# Patient Record
Sex: FEMALE | Race: WHITE | NOT HISPANIC OR LATINO | Employment: FULL TIME | URBAN - METROPOLITAN AREA
[De-identification: names, ages, dates, MRNs, and addresses within clinical notes are randomized per-mention and may not be internally consistent; named-entity substitution may affect disease eponyms.]

---

## 2017-02-24 ENCOUNTER — OFFICE VISIT (OUTPATIENT)
Dept: FAMILY MEDICINE | Facility: CLINIC | Age: 22
End: 2017-02-24
Payer: COMMERCIAL

## 2017-02-24 VITALS
HEART RATE: 88 BPM | BODY MASS INDEX: 47.75 KG/M2 | WEIGHT: 286.63 LBS | OXYGEN SATURATION: 97 % | RESPIRATION RATE: 18 BRPM | HEIGHT: 65 IN | SYSTOLIC BLOOD PRESSURE: 110 MMHG | TEMPERATURE: 98 F | DIASTOLIC BLOOD PRESSURE: 72 MMHG

## 2017-02-24 DIAGNOSIS — K21.9 GASTROESOPHAGEAL REFLUX DISEASE, ESOPHAGITIS PRESENCE NOT SPECIFIED: Primary | ICD-10-CM

## 2017-02-24 DIAGNOSIS — E66.01 MORBID OBESITY WITH BMI OF 45.0-49.9, ADULT: ICD-10-CM

## 2017-02-24 PROCEDURE — 99999 PR PBB SHADOW E&M-EST. PATIENT-LVL III: CPT | Mod: PBBFAC,,, | Performed by: FAMILY MEDICINE

## 2017-02-24 PROCEDURE — 99214 OFFICE O/P EST MOD 30 MIN: CPT | Mod: S$GLB,,, | Performed by: FAMILY MEDICINE

## 2017-02-24 PROCEDURE — 1160F RVW MEDS BY RX/DR IN RCRD: CPT | Mod: S$GLB,,, | Performed by: FAMILY MEDICINE

## 2017-02-24 RX ORDER — OMEPRAZOLE 20 MG/1
20 CAPSULE, DELAYED RELEASE ORAL DAILY
Qty: 30 CAPSULE | Refills: 11 | Status: SHIPPED | OUTPATIENT
Start: 2017-02-24 | End: 2017-07-06 | Stop reason: ALTCHOICE

## 2017-02-24 NOTE — MR AVS SNAPSHOT
[facility]  [address] [facility]  [place] [address]  Phone: [phone]                  Frankie Garcia   2017 4:20 PM   Office Visit    Description:  Female : 1995   Provider:  Kisha Cortes MD   Department:  [facility]           Reason for Visit     Chest Pain     Headache           Diagnoses this Visit        Comments    Gastroesophageal reflux disease, esophagitis presence not specified    -  Primary            To Do List           Future Appointments        Provider Department Dept Phone    2017 4:20 PM Kisha Cortes MD [facility] [phone]      Goals (5 Years of Data)     None       These Medications        Disp Refills Start End    omeprazole (PRILOSEC) 20 MG capsule 30 capsule 11 2017    Take 1 capsule (20 mg total) by mouth once daily. - Oral    Pharmacy: [facility] - [address] Ph #: [phone]         [facility]     [facility] Nurse Care Line -  Assistance  Registered nurses in the [facility] provide clinical advisement, health education, appointment booking, and other advisory services.  Call for this free service at [phone].             Medications           Message regarding Medications     Verify the changes and/or additions to your medication regime listed below are the same as discussed with your clinician today.  If any of these changes or additions are incorrect, please notify your healthcare provider.        START taking these NEW medications        Refills    omeprazole (PRILOSEC) 20 MG capsule 11    Sig: Take 1 capsule (20 mg total) by mouth once daily.    Class: Normal    Route: Oral      STOP taking these medications     fluticasone (FLONASE) 50 mcg/actuation nasal spray 1 spray by Each Nare route once daily.    L norgest/e.estradiol-e.estrad (CAMRESE LO) 0.10 mg-20 mcg (84)/10 mcg (7) 3MPk Take 1  tablet by mouth once daily.    fexofenadine (ALLEGRA) 180 MG tablet Take 1 tablet (180 mg total) by mouth once daily.           Verify that the below list of medications is an accurate representation of the medications you are currently taking.  If none reported, the list may be blank. If incorrect, please contact your healthcare provider. Carry this list with you in case of emergency.           Current Medications     omeprazole (PRILOSEC) 20 MG capsule Take 1 capsule (20 mg total) by mouth once daily.           Clinical Reference Information           Your Vitals Were     BP                   110/72 (BP Location: Right arm, Patient Position: Sitting, BP Method: Manual)           Blood Pressure          Most Recent Value    BP  110/72      Allergies as of 2/24/2017     No Known Allergies      Immunizations Administered on Date of Encounter - 2/24/2017     None      Language Assistance Services     ATTENTION: Language assistance services are available, free of charge. Please call [phone].      ATENCIÓN: Si habla eva, tiene a holloway disposición servicios gratuitos de asistencia lingüística. Llame al [phone].     [facility] Ý: N?u b?n nói Ti?ng Vi?t, có các d?ch v? h? tr? ngôn ng? mi?n phí dành cho b?n. G?i s? [phone].         [facility] complies with applicable Federal civil rights laws and does not discriminate on the basis of race, color, national origin, age, disability, or sex.

## 2017-02-24 NOTE — PROGRESS NOTES
Routine Office Visit    Patient Name: Frankie Garcia    : 1995  MRN: [id number]    Subjective:  Frankie is a 21 y.o. female who presents today for:    1. Chest pressure - epigastric pressure for a couple weeks.  Happens throughout the day occasionally, but notices that it's worse with laying down.  Feels like it radiates to her upper back as well.  4-5 times a week, having frontal tightness in her forehead.  Takes ibuprofen, sometimes helps.  Used to have chest pressure on/off in the past.  Has nightmares a lot, wonders if that was associated.  Has woken up in the night, and feels pressure in middle of her chest.  Right now having stabbing pressure in epigastric area.  Has felt decreased appetite, +acid coming up throat. Denies metallic taste.  Hasn't noticed belching. +nausea - this morning. Works at the [employer].  Never has been told she has GERD before. Denies alcohol use. Denies cigarette use.     Coffee - 1-2 times a week.     Past Medical History  History reviewed. No pertinent past medical history.    Past Surgical History  History reviewed. No pertinent surgical history.    Family History  Family History   Problem Relation Age of Onset    Arthritis Father     Diabetes Maternal Aunt        Social History  Social History     Social History    Marital status: Single     Spouse name: N/A    Number of children: N/A    Years of education: N/A     Occupational History    Not on file.     Social History Main Topics    Smoking status: Never Smoker    Smokeless tobacco: Never Used    Alcohol use No    Drug use: No    Sexual activity: Yes     Partners: Female     Birth control/ protection: None     Other Topics Concern    Not on file     Social History Narrative       Current Medications  Current Outpatient Prescriptions on File Prior to Visit   Medication Sig Dispense Refill    fexofenadine (ALLEGRA) 180 MG tablet Take 1 tablet (180 mg total) by mouth once daily. 30 tablet 2    L  "norgest/e.estradiol-e.estrad (CAMRESE LO) 0.10 mg-20 mcg (84)/10 mcg (7) 3MPk Take 1 tablet by mouth once daily. 90 tablet 3    [DISCONTINUED] fluticasone (FLONASE) 50 mcg/actuation nasal spray 1 spray by Each Nare route once daily. 1 Bottle 0     No current facility-administered medications on file prior to visit.        Allergies   Review of patient's allergies indicates:  No Known Allergies    Review of Systems (Pertinent positives)  Constititutional: Weight loss, excess fatigue, chills, fever, night sweats, weakness, loss of appetite  Ears: Earache, ringing in ears, discharge, hearing loss, hearing aid, popping, infection Nose: stuffy nose, mouth breathing, post-nasal drip,   Throat: hoarseness, bad breath, swelling, sore throat  Lungs: Cough, sputum, wheeze, frequent URI  Heart: Chest pain, angina, palpitations, extra heart beats  Stomach/Intestine: Heartburn, Nausea, vomiting, diarrhea, indigestion, bloating, constipation  Brain: Numbness, tingling, tremor, fainting, headaches, muscle weakness,    /72 (BP Location: Right arm, Patient Position: Sitting, BP Method: Manual)  Pulse 88  Temp 98.3 °F (36.8 °C) (Oral)   Resp 18  Ht 5' 5" (1.651 m)  Wt 130 kg (286 lb 9.6 oz)  LMP 02/03/2017  SpO2 97%  BMI 47.69 kg/m2    GENERAL APPEARANCE: in no apparent distress and well developed and well nourished  RESPIRATORY: appears well, vitals normal, no respiratory distress, acyanotic, normal RR, chest clear, no wheezing, crepitations, rhonchi, normal symmetric air entry  HEART: regular rate and rhythm, S1, S2 normal, no murmur, click, rub or gallop.    ABDOMEN: abdomen is soft with +epigastric tenderness, no masses, no hernias, no organomegaly, no rebound, no guarding. Suprapubic tenderness absent. No CVA tenderness.  NEUROLOGIC: normal without focal findings, CN II-XII are intact.    Extremities: warm/well perfused.  No abnormal hair patterns.  No clubbing, cyanosis or edema.   SKIN: no rashes, no wounds, no " other lesions  PSYCH: Alert, oriented x 3, thought content appropriate, speech normal, pleasant and cooperative, good eye contact, well groomed, recall good, concentration/judgement good and apparently average intelligence.    Assessment/Plan:  Frankie Garcia is a 21 y.o. female who presents today for :    Frankie was seen today for chest pain and headache.    Diagnoses and all orders for this visit:    Gastroesophageal reflux disease, esophagitis presence not specified  -     omeprazole (PRILOSEC) 20 MG capsule; Take 1 capsule (20 mg total) by mouth once daily.  -    Handout for GERD dietary considerations.  Also recommended weight loss     Morbid obesity with BMI of 45.0-49.9, adult        -   Noted.  Patient will return to discuss when able    F/u PRN if symptoms persist

## 2017-07-06 ENCOUNTER — OFFICE VISIT (OUTPATIENT)
Dept: FAMILY MEDICINE | Facility: CLINIC | Age: 22
End: 2017-07-06
Payer: COMMERCIAL

## 2017-07-06 VITALS
BODY MASS INDEX: 47.09 KG/M2 | HEIGHT: 66 IN | WEIGHT: 293 LBS | SYSTOLIC BLOOD PRESSURE: 110 MMHG | DIASTOLIC BLOOD PRESSURE: 72 MMHG | TEMPERATURE: 98 F | OXYGEN SATURATION: 99 % | HEART RATE: 72 BPM

## 2017-07-06 DIAGNOSIS — Z23 NEED FOR HPV VACCINATION: ICD-10-CM

## 2017-07-06 DIAGNOSIS — J06.9 VIRAL URI: Primary | ICD-10-CM

## 2017-07-06 PROCEDURE — 99999 PR PBB SHADOW E&M-EST. PATIENT-LVL III: CPT | Mod: PBBFAC,,, | Performed by: INTERNAL MEDICINE

## 2017-07-06 PROCEDURE — 99213 OFFICE O/P EST LOW 20 MIN: CPT | Mod: S$GLB,,, | Performed by: INTERNAL MEDICINE

## 2017-07-06 PROCEDURE — 90651 9VHPV VACCINE 2/3 DOSE IM: CPT | Mod: S$GLB,,, | Performed by: INTERNAL MEDICINE

## 2017-07-06 PROCEDURE — 90471 IMMUNIZATION ADMIN: CPT | Mod: S$GLB,,, | Performed by: INTERNAL MEDICINE

## 2017-07-06 RX ORDER — BENZONATATE 100 MG/1
100 CAPSULE ORAL 3 TIMES DAILY PRN
Qty: 30 CAPSULE | Refills: 0 | Status: SHIPPED | OUTPATIENT
Start: 2017-07-06 | End: 2017-07-16

## 2017-07-06 RX ORDER — IPRATROPIUM BROMIDE 42 UG/1
2 SPRAY, METERED NASAL 3 TIMES DAILY
Qty: 30 ML | Refills: 0 | Status: SHIPPED | OUTPATIENT
Start: 2017-07-06 | End: 2017-07-13

## 2017-07-06 NOTE — PROGRESS NOTES
This note was created by combination of typed  and Dragon dictation.  Transcription errors may be present.  If there are any questions, please contact me.    Assessment & Plan  Viral URI - rest, fluids.  Tessalon and atrovent for symptom control  -     benzonatate (TESSALON) 100 MG capsule; Take 1 capsule (100 mg total) by mouth 3 (three) times daily as needed for Cough.  Dispense: 30 capsule; Refill: 0  -     ipratropium (ATROVENT) 0.06 % nasal spray; 2 sprays by Nasal route 3 (three) times daily. AS NEEDED FOR NASAL CONGESTION AND DRIP  Dispense: 30 mL; Refill: 0    Need for HPV vaccination - start series today.  -     HPV Vaccine (9-Valent) (3 Dose) (IM)        Medications Discontinued During This Encounter   Medication Reason    omeprazole (PRILOSEC) 20 MG capsule Therapy completed       Follow-up: No Follow-up on file.      =================================================================      Chief Complaint   Patient presents with    Facial Pain    throat pain       HPI  Frankie is a 22 y.o. female, last appointment with this clinic was Visit date not found.    Patient's last menstrual period was 06/28/2017 (exact date).    2-3 days of sore throat, congestion, pressure in the chest and head.  Fever on July 4, 100.something.  The post nasal drip making her nauseated.  Can't sleep b/c of the sneezing.  Nothing OTC for this so far other than 2 Aleve for the fever.  Sick contacts - none.  Cough, nonproductive.  Hx of childhood asthma.  No allergic rhinitis.  No smoker.      Patient Care Team:  Michelle Hadley MD as PCP - General (Family Medicine)    Patient Active Problem List    Diagnosis Date Noted    Morbid obesity with BMI of 45.0-49.9, adult 02/24/2017       PAST MEDICAL HISTORY:  History reviewed. No pertinent past medical history.    PAST SURGICAL HISTORY:  History reviewed. No pertinent surgical history.    SOCIAL HISTORY:  Social History     Social History    Marital status: Single      "Spouse name: N/A    Number of children: N/A    Years of education: N/A     Occupational History    Not on file.     Social History Main Topics    Smoking status: Never Smoker    Smokeless tobacco: Never Used    Alcohol use No    Drug use: No    Sexual activity: Yes     Partners: Female     Birth control/ protection: None     Other Topics Concern    Not on file     Social History Narrative    No narrative on file       ALLERGIES AND MEDICATIONS: updated and reviewed.  Review of patient's allergies indicates:  No Known Allergies  No current outpatient prescriptions on file.     No current facility-administered medications for this visit.        Review of Systems   Constitutional: Positive for fever. Negative for chills and malaise/fatigue.   HENT: Positive for congestion and sore throat. Negative for ear pain and hearing loss.    Respiratory: Positive for cough. Negative for sputum production and shortness of breath.    Cardiovascular: Negative for chest pain.   Musculoskeletal: Negative for myalgias and neck pain.   Skin: Negative for rash.   Neurological: Negative for headaches.       Physical Exam   Vitals:    07/06/17 1019   BP: 110/72   Pulse: 72   Temp: 98.3 °F (36.8 °C)   SpO2: 99%   Weight: 135.5 kg (298 lb 11.6 oz)   Height: 5' 5.5" (1.664 m)    Body mass index is 48.95 kg/m².  Weight: 135.5 kg (298 lb 11.6 oz)   Height: 5' 5.5" (166.4 cm)     Physical Exam   Constitutional: She is oriented to person, place, and time. She appears well-developed and well-nourished.   HENT:   Right Ear: Tympanic membrane and ear canal normal.   Left Ear: Tympanic membrane and ear canal normal.   Mouth/Throat: No oral lesions. No oropharyngeal exudate or posterior oropharyngeal erythema.   OP mild cobblestoning   Eyes: EOM are normal.   Neck: Neck supple.   Cardiovascular: Normal rate, regular rhythm and normal heart sounds.    Pulmonary/Chest: Effort normal and breath sounds normal. She has no wheezes. "   Lymphadenopathy:     She has no cervical adenopathy.   Neurological: She is alert and oriented to person, place, and time.   Skin: Skin is warm and dry.   Psychiatric: She has a normal mood and affect. Her behavior is normal.

## 2019-02-18 ENCOUNTER — HOSPITAL ENCOUNTER (INPATIENT)
Facility: HOSPITAL | Age: 24
LOS: 3 days | Discharge: HOME | DRG: 872 | End: 2019-02-22
Attending: EMERGENCY MEDICINE | Admitting: HOSPITALIST
Payer: COMMERCIAL

## 2019-02-18 DIAGNOSIS — N12 PYELONEPHRITIS: Primary | ICD-10-CM

## 2019-02-18 DIAGNOSIS — R50.9 FEVER: ICD-10-CM

## 2019-02-18 PROBLEM — E66.01 MORBID OBESITY WITH BMI OF 45.0-49.9, ADULT: Chronic | Status: ACTIVE | Noted: 2017-02-24

## 2019-02-18 PROBLEM — E87.1 HYPONATREMIA: Status: ACTIVE | Noted: 2019-02-18

## 2019-02-18 PROBLEM — F17.210 CIGARETTE SMOKER: Chronic | Status: ACTIVE | Noted: 2019-02-18

## 2019-02-18 PROBLEM — E66.813 CLASS 3 SEVERE OBESITY DUE TO EXCESS CALORIES WITHOUT SERIOUS COMORBIDITY WITH BODY MASS INDEX (BMI) OF 40.0 TO 44.9 IN ADULT: Chronic | Status: ACTIVE | Noted: 2017-02-24

## 2019-02-18 PROBLEM — I88.0 MESENTERIC LYMPHADENITIS: Status: ACTIVE | Noted: 2019-02-18

## 2019-02-18 PROBLEM — D64.9 ANEMIA: Status: ACTIVE | Noted: 2019-02-18

## 2019-02-18 LAB
ALBUMIN SERPL BCP-MCNC: 4 G/DL
ALP SERPL-CCNC: 81 U/L
ALT SERPL W/O P-5'-P-CCNC: 23 U/L
ANION GAP SERPL CALC-SCNC: 10 MMOL/L
AST SERPL-CCNC: 16 U/L
B-HCG UR QL: NEGATIVE
BACTERIA #/AREA URNS HPF: ABNORMAL /HPF
BASOPHILS # BLD AUTO: 0.01 K/UL
BASOPHILS NFR BLD: 0.1 %
BILIRUB SERPL-MCNC: 1.1 MG/DL
BILIRUB UR QL STRIP: NEGATIVE
BUN SERPL-MCNC: 11 MG/DL
CALCIUM SERPL-MCNC: 9.5 MG/DL
CHLORIDE SERPL-SCNC: 103 MMOL/L
CLARITY UR: CLEAR
CO2 SERPL-SCNC: 20 MMOL/L
COLOR UR: ABNORMAL
CREAT SERPL-MCNC: 0.9 MG/DL
CTP QC/QA: YES
DIFFERENTIAL METHOD: ABNORMAL
EOSINOPHIL # BLD AUTO: 0.1 K/UL
EOSINOPHIL NFR BLD: 1.1 %
ERYTHROCYTE [DISTWIDTH] IN BLOOD BY AUTOMATED COUNT: 13.6 %
EST. GFR  (AFRICAN AMERICAN): >60 ML/MIN/1.73 M^2
EST. GFR  (NON AFRICAN AMERICAN): >60 ML/MIN/1.73 M^2
FERRITIN SERPL-MCNC: 145 NG/ML
GLUCOSE SERPL-MCNC: 92 MG/DL
GLUCOSE UR QL STRIP: NEGATIVE
HCT VFR BLD AUTO: 36.4 %
HGB BLD-MCNC: 11.9 G/DL
HGB UR QL STRIP: ABNORMAL
HYALINE CASTS #/AREA URNS LPF: 0 /LPF
IRON SERPL-MCNC: 15 UG/DL
KETONES UR QL STRIP: ABNORMAL
LACTATE SERPL-SCNC: 0.7 MMOL/L
LEUKOCYTE ESTERASE UR QL STRIP: NEGATIVE
LYMPHOCYTES # BLD AUTO: 1 K/UL
LYMPHOCYTES NFR BLD: 13.3 %
MCH RBC QN AUTO: 28.7 PG
MCHC RBC AUTO-ENTMCNC: 32.7 G/DL
MCV RBC AUTO: 88 FL
MICROSCOPIC COMMENT: ABNORMAL
MONOCYTES # BLD AUTO: 0.8 K/UL
MONOCYTES NFR BLD: 10.1 %
NEUTROPHILS # BLD AUTO: 5.6 K/UL
NEUTROPHILS NFR BLD: 75.4 %
NITRITE UR QL STRIP: POSITIVE
PH UR STRIP: 5 [PH] (ref 5–8)
PLATELET # BLD AUTO: 199 K/UL
PMV BLD AUTO: 9.7 FL
POTASSIUM SERPL-SCNC: 3.9 MMOL/L
PROT SERPL-MCNC: 7.4 G/DL
PROT UR QL STRIP: ABNORMAL
RBC # BLD AUTO: 4.15 M/UL
RBC #/AREA URNS HPF: 2 /HPF (ref 0–4)
SATURATED IRON: 5 %
SODIUM SERPL-SCNC: 133 MMOL/L
SP GR UR STRIP: 1.01 (ref 1–1.03)
SQUAMOUS #/AREA URNS HPF: 3 /HPF
TOTAL IRON BINDING CAPACITY: 329 UG/DL
TRANSFERRIN SERPL-MCNC: 222 MG/DL
URN SPEC COLLECT METH UR: ABNORMAL
UROBILINOGEN UR STRIP-ACNC: ABNORMAL EU/DL
WBC # BLD AUTO: 7.42 K/UL
WBC #/AREA URNS HPF: 3 /HPF (ref 0–5)

## 2019-02-18 PROCEDURE — 63600175 PHARM REV CODE 636 W HCPCS: Performed by: HOSPITALIST

## 2019-02-18 PROCEDURE — G0378 HOSPITAL OBSERVATION PER HR: HCPCS

## 2019-02-18 PROCEDURE — 96372 THER/PROPH/DIAG INJ SC/IM: CPT

## 2019-02-18 PROCEDURE — 83605 ASSAY OF LACTIC ACID: CPT

## 2019-02-18 PROCEDURE — 96375 TX/PRO/DX INJ NEW DRUG ADDON: CPT

## 2019-02-18 PROCEDURE — 25000003 PHARM REV CODE 250: Performed by: PHYSICIAN ASSISTANT

## 2019-02-18 PROCEDURE — 82728 ASSAY OF FERRITIN: CPT

## 2019-02-18 PROCEDURE — 85025 COMPLETE CBC W/AUTO DIFF WBC: CPT

## 2019-02-18 PROCEDURE — 93010 ELECTROCARDIOGRAM REPORT: CPT | Mod: ,,, | Performed by: INTERNAL MEDICINE

## 2019-02-18 PROCEDURE — 80053 COMPREHEN METABOLIC PANEL: CPT

## 2019-02-18 PROCEDURE — 93010 EKG 12-LEAD: ICD-10-PCS | Mod: ,,, | Performed by: INTERNAL MEDICINE

## 2019-02-18 PROCEDURE — 25500020 PHARM REV CODE 255: Performed by: EMERGENCY MEDICINE

## 2019-02-18 PROCEDURE — 83540 ASSAY OF IRON: CPT

## 2019-02-18 PROCEDURE — 63600175 PHARM REV CODE 636 W HCPCS: Performed by: EMERGENCY MEDICINE

## 2019-02-18 PROCEDURE — 99285 EMERGENCY DEPT VISIT HI MDM: CPT | Mod: 25

## 2019-02-18 PROCEDURE — 36415 COLL VENOUS BLD VENIPUNCTURE: CPT

## 2019-02-18 PROCEDURE — 87086 URINE CULTURE/COLONY COUNT: CPT

## 2019-02-18 PROCEDURE — 81025 URINE PREGNANCY TEST: CPT | Performed by: PHYSICIAN ASSISTANT

## 2019-02-18 PROCEDURE — 81000 URINALYSIS NONAUTO W/SCOPE: CPT

## 2019-02-18 PROCEDURE — 96365 THER/PROPH/DIAG IV INF INIT: CPT

## 2019-02-18 PROCEDURE — 25000003 PHARM REV CODE 250: Performed by: HOSPITALIST

## 2019-02-18 PROCEDURE — 25000003 PHARM REV CODE 250: Performed by: EMERGENCY MEDICINE

## 2019-02-18 PROCEDURE — 93005 ELECTROCARDIOGRAM TRACING: CPT

## 2019-02-18 PROCEDURE — 87040 BLOOD CULTURE FOR BACTERIA: CPT

## 2019-02-18 RX ORDER — ACETAMINOPHEN 325 MG/1
650 TABLET ORAL EVERY 6 HOURS PRN
Status: DISCONTINUED | OUTPATIENT
Start: 2019-02-18 | End: 2019-02-19

## 2019-02-18 RX ORDER — ONDANSETRON 2 MG/ML
4 INJECTION INTRAMUSCULAR; INTRAVENOUS EVERY 6 HOURS PRN
Status: DISCONTINUED | OUTPATIENT
Start: 2019-02-18 | End: 2019-02-22 | Stop reason: HOSPADM

## 2019-02-18 RX ORDER — ONDANSETRON 2 MG/ML
8 INJECTION INTRAMUSCULAR; INTRAVENOUS
Status: COMPLETED | OUTPATIENT
Start: 2019-02-18 | End: 2019-02-18

## 2019-02-18 RX ORDER — SODIUM CHLORIDE 0.9 % (FLUSH) 0.9 %
5 SYRINGE (ML) INJECTION
Status: DISCONTINUED | OUTPATIENT
Start: 2019-02-18 | End: 2019-02-22 | Stop reason: HOSPADM

## 2019-02-18 RX ORDER — MORPHINE SULFATE 10 MG/ML
3 INJECTION INTRAMUSCULAR; INTRAVENOUS; SUBCUTANEOUS ONCE
Status: COMPLETED | OUTPATIENT
Start: 2019-02-18 | End: 2019-02-18

## 2019-02-18 RX ORDER — RAMELTEON 8 MG/1
8 TABLET ORAL NIGHTLY PRN
Status: DISCONTINUED | OUTPATIENT
Start: 2019-02-18 | End: 2019-02-22 | Stop reason: HOSPADM

## 2019-02-18 RX ORDER — PROCHLORPERAZINE EDISYLATE 5 MG/ML
10 INJECTION INTRAMUSCULAR; INTRAVENOUS EVERY 6 HOURS PRN
Status: DISCONTINUED | OUTPATIENT
Start: 2019-02-18 | End: 2019-02-22 | Stop reason: HOSPADM

## 2019-02-18 RX ORDER — SODIUM CHLORIDE 9 MG/ML
INJECTION, SOLUTION INTRAVENOUS CONTINUOUS
Status: DISCONTINUED | OUTPATIENT
Start: 2019-02-18 | End: 2019-02-22 | Stop reason: HOSPADM

## 2019-02-18 RX ORDER — KETOROLAC TROMETHAMINE 30 MG/ML
15 INJECTION, SOLUTION INTRAMUSCULAR; INTRAVENOUS EVERY 6 HOURS PRN
Status: DISCONTINUED | OUTPATIENT
Start: 2019-02-18 | End: 2019-02-20

## 2019-02-18 RX ORDER — MORPHINE SULFATE 10 MG/ML
4 INJECTION INTRAMUSCULAR; INTRAVENOUS; SUBCUTANEOUS EVERY 4 HOURS PRN
Status: DISCONTINUED | OUTPATIENT
Start: 2019-02-18 | End: 2019-02-18

## 2019-02-18 RX ORDER — ENOXAPARIN SODIUM 100 MG/ML
40 INJECTION SUBCUTANEOUS EVERY 24 HOURS
Status: DISCONTINUED | OUTPATIENT
Start: 2019-02-18 | End: 2019-02-19

## 2019-02-18 RX ORDER — ONDANSETRON 2 MG/ML
4 INJECTION INTRAMUSCULAR; INTRAVENOUS EVERY 8 HOURS PRN
Status: DISCONTINUED | OUTPATIENT
Start: 2019-02-18 | End: 2019-02-18

## 2019-02-18 RX ADMIN — ENOXAPARIN SODIUM 40 MG: 100 INJECTION SUBCUTANEOUS at 09:02

## 2019-02-18 RX ADMIN — KETOROLAC TROMETHAMINE 15 MG: 30 INJECTION, SOLUTION INTRAMUSCULAR at 09:02

## 2019-02-18 RX ADMIN — SODIUM CHLORIDE: 0.9 INJECTION, SOLUTION INTRAVENOUS at 09:02

## 2019-02-18 RX ADMIN — ACETAMINOPHEN 650 MG: 325 TABLET, FILM COATED ORAL at 09:02

## 2019-02-18 RX ADMIN — RAMELTEON 8 MG: 8 TABLET, FILM COATED ORAL at 11:02

## 2019-02-18 RX ADMIN — IOHEXOL 100 ML: 350 INJECTION, SOLUTION INTRAVENOUS at 05:02

## 2019-02-18 RX ADMIN — SODIUM CHLORIDE 3333 ML: 0.9 INJECTION, SOLUTION INTRAVENOUS at 04:02

## 2019-02-18 RX ADMIN — MORPHINE SULFATE 3 MG: 10 INJECTION INTRAVENOUS at 04:02

## 2019-02-18 RX ADMIN — CEFTRIAXONE 1 G: 1 INJECTION, SOLUTION INTRAVENOUS at 05:02

## 2019-02-18 RX ADMIN — PIPERACILLIN AND TAZOBACTAM 4.5 G: 4; .5 INJECTION, POWDER, LYOPHILIZED, FOR SOLUTION INTRAVENOUS; PARENTERAL at 08:02

## 2019-02-18 RX ADMIN — ONDANSETRON 8 MG: 2 INJECTION INTRAMUSCULAR; INTRAVENOUS at 04:02

## 2019-02-18 NOTE — ED TRIAGE NOTES
Pt reports that she was recently treated for UTI with bactrim and just began keflex yesterday.  Pt reports lower abdominal pain and bilateral flank pain.  Denies cp, sob, n/v/d.  Endorses f/c and reports temps as high as 103.  VSS, NAD.  On cardiac monitor, bp cuff and pulse ox.

## 2019-02-18 NOTE — ED PROVIDER NOTES
Encounter Date: 2/18/2019  SORT: This is a 23 y.o. female who presents for emergent consideration of fever, abdominal pain, back pain, nausea, vomiting. Patient was diagnosed with UTI on Wednesday and prescribed Bactrim. Her symptoms did not improve and she began having fever; seen at urgent care yesterday and given Keflex. She also reports taking Azo, Ibuprofen (last dose last night) and Tylenol (1.5 hours PTA). Patient will be moved to a room when one is available. Orders placed.  MINESH Cifuentes PA-C     SCRIBE #1 NOTE: IPanchito, am scribing for, and in the presence of,  Michi Lopez MD. I have scribed the following portions of the note - Other sections scribed: HPI, ROS.       History     Chief Complaint   Patient presents with    Abdominal Pain     Pt was dx with UTI on Wed and started abx. Abx was changed yesterday from urgent care. Pt reports continued fever and increasing abdominal pain and back pain today. Pt last had Tylenol 1 1/2 - 2 hrs ago.     Back Pain     CC: Abdominal Pain; Back Pain;    HPI: This 23 y.o. Female with no pertinent PMHx presents to the ED for an evaluation of body aches, fluctuating fever gnjs=957.6F, lower back pain, lower abdominal pain, chest pain, nausea and emesis for the past week. Pt reports coming to the ED today b/c her symptoms are worsening. She was diagnosed with a UTI 6 days ago and started taking bactrim until yesterday. Pt went to urgent care yesterday and was told bactrim made her symptoms worse and diagnosed her with a kidney infection. Her fever was the lowest at 99F. Pt recently had the flu. She has been taking tylenol and ibuprofen with slight relief, most recently 2 hours PTA. Pt denies diarrhea, dysuria or numbness.      The history is provided by the patient. No  was used.     Review of patient's allergies indicates:  No Known Allergies  Past Medical History:   Diagnosis Date    Cigarette smoker     1ppd    Influenza  01/2019    Pyelonephritis 02/18/2019     Past Surgical History:   Procedure Laterality Date    NO PAST SURGERIES  02/18/2019     Family History   Problem Relation Age of Onset    Arthritis Father     Diabetes Maternal Aunt      Social History     Tobacco Use    Smoking status: Current Every Day Smoker     Packs/day: 0.07     Years: 1.00     Pack years: 0.07    Smokeless tobacco: Never Used   Substance Use Topics    Alcohol use: Yes     Alcohol/week: 0.0 oz     Comment: socially    Drug use: No     Review of Systems   Constitutional: Positive for fever. Negative for chills.   HENT: Negative for ear pain, rhinorrhea and sore throat.    Eyes: Negative for redness.   Respiratory: Negative for shortness of breath.    Cardiovascular: Positive for chest pain.   Gastrointestinal: Positive for abdominal pain (lower), nausea and vomiting. Negative for diarrhea.   Genitourinary: Negative for dysuria and hematuria.   Musculoskeletal: Positive for back pain (lower). Negative for neck pain.        (+) body aches   Skin: Negative for rash.   Neurological: Negative for weakness, numbness and headaches.   Hematological: Does not bruise/bleed easily.   Psychiatric/Behavioral: The patient is not nervous/anxious.        Physical Exam     Initial Vitals [02/18/19 1553]   BP Pulse Resp Temp SpO2   126/77 95 20 99.5 °F (37.5 °C) 96 %      MAP       --         Physical Exam    Vitals reviewed.  Constitutional: She appears well-developed and well-nourished.   HENT:   Head: Normocephalic and atraumatic.   Nose: Nose normal.   Mouth/Throat: No oropharyngeal exudate.   Eyes: EOM are normal. Pupils are equal, round, and reactive to light.   Neck: Normal range of motion. Neck supple. No JVD present.   Cardiovascular: Normal rate, regular rhythm, normal heart sounds and intact distal pulses.   Pulmonary/Chest: Breath sounds normal. No stridor. No respiratory distress. She has no wheezes. She has no rhonchi. She has no rales. She exhibits  no tenderness.   Abdominal: Soft. Bowel sounds are normal. She exhibits no distension and no mass. There is tenderness in the right lower quadrant. There is no rigidity, no rebound, no guarding and no CVA tenderness.   Musculoskeletal: Normal range of motion. She exhibits no edema.        Lumbar back: She exhibits tenderness. She exhibits no bony tenderness.   Neurological: She is alert and oriented to person, place, and time. She has normal strength. No cranial nerve deficit or sensory deficit.   Skin: Skin is warm and dry. Capillary refill takes less than 2 seconds.   Psychiatric: She has a normal mood and affect. Thought content normal.         ED Course   Procedures  Labs Reviewed   CBC W/ AUTO DIFFERENTIAL - Abnormal; Notable for the following components:       Result Value    Hemoglobin 11.9 (*)     Hematocrit 36.4 (*)     Gran% 75.4 (*)     Lymph% 13.3 (*)     All other components within normal limits   COMPREHENSIVE METABOLIC PANEL - Abnormal; Notable for the following components:    Sodium 133 (*)     CO2 20 (*)     Total Bilirubin 1.1 (*)     All other components within normal limits   URINALYSIS, REFLEX TO URINE CULTURE - Abnormal; Notable for the following components:    Color, UA Orange (*)     Protein, UA 1+ (*)     Ketones, UA Trace (*)     Occult Blood UA 1+ (*)     Nitrite, UA Positive (*)     Urobilinogen, UA 4.0-6.0 (*)     All other components within normal limits    Narrative:     Preferred Collection Type->Urine, Clean Catch   URINALYSIS MICROSCOPIC - Abnormal; Notable for the following components:    Bacteria, UA Many (*)     All other components within normal limits    Narrative:     Preferred Collection Type->Urine, Clean Catch   LACTIC ACID, PLASMA   POCT URINE PREGNANCY        ECG Results          EKG 12-lead (In process)  Result time 02/19/19 06:45:50    In process by Interface, Lab In [facility] (02/19/19 06:45:50)                 Narrative:    Test Reason : R50.9,    Vent. Rate : 090 BPM      Atrial Rate : 090 BPM     P-R Int : 144 ms          QRS Dur : 082 ms      QT Int : 338 ms       P-R-T Axes : 020 038 012 degrees     QTc Int : 413 ms    Normal sinus rhythm  Normal ECG  No previous ECGs available    Referred By: [email or web address]   SELF           Confirmed By:                   In process by Interface, Lab In [facility] (02/19/19 06:36:17)                 Narrative:    Test Reason : R50.9,    Vent. Rate : 090 BPM     Atrial Rate : 090 BPM     P-R Int : 144 ms          QRS Dur : 082 ms      QT Int : 338 ms       P-R-T Axes : 020 038 012 degrees     QTc Int : 413 ms    Normal sinus rhythm  Normal ECG  No previous ECGs available    Referred By: [email or web address]   SELF           Confirmed By:                             Imaging Results          US Pelvis Comp with Transvag NON-OB (xpd) (Final result)  Result time 02/18/19 20:10:52   Procedure changed from US Pelvis Complete Non OB     Final result by Naga Herrera MD (02/18/19 20:10:52)                 Impression:      Free fluid seen in the pelvis which is slightly greater than expected normal physiologic volume.    Otherwise no significant abnormalities identified.      Electronically signed by: Naga Herrera MD  Date:    02/18/2019  Time:    20:10             Narrative:    EXAMINATION:  US PELVIS COMP WITH TRANSVAG NON-OB (XPD)    CLINICAL HISTORY:  pelvic inflamation and free fluid on CT;    TECHNIQUE:  Transabdominal sonography of the pelvis was performed, followed by transvaginal sonography to better evaluate the uterus and ovaries.    COMPARISON:  CT abdomen and pelvis from the same date.    FINDINGS:  The uterus measures 8.0 x 4.1 x 4.2 cm. Uterine parenchyma is homogeneous without evidence for masses. The endometrial echo complex is normal in thickness and measures 12 mm.  Small nabothian cyst is noted.    The right ovary is normal in size and measures 3.3 x 2.0 x 2.7 cm. The left ovary is normal in size and measures 3.8 x 2.0 x 2.1 cm.  Small cyst or  dominant follicle is seen in the right ovary measuring 1.7 x 1.1 x 1.5 cm.  Arterial and venous flow are preserved bilaterally. Free fluid is seen in the pelvis which is slightly greater than expected normal physiologic amount.                               X-Ray Chest AP Portable (Final result)  Result time 02/18/19 17:44:18    Final result by Naga Herrera MD (02/18/19 17:44:18)                 Impression:      No acute intrathoracic process identified.      Electronically signed by: Naga Herrera MD  Date:    02/18/2019  Time:    17:44             Narrative:    EXAMINATION:  XR CHEST AP PORTABLE    CLINICAL HISTORY:  Sepsis;    TECHNIQUE:  Single frontal view of the chest was performed.    COMPARISON:  None    FINDINGS:  Cardiac silhouette is normal in size.  Lungs are symmetrically expanded.  No evidence of focal consolidative process, pneumothorax, or significant effusion.  No acute osseous abnormality identified.                               CT Abdomen Pelvis With Contrast (Final result)  Result time 02/18/19 17:38:24    Final result by Naga Herrera MD (02/18/19 17:38:24)                 Impression:      1. Inflammatory changes seen within the lower abdomen, more pronounced within the right lower quadrant and right adnexal region of uncertain etiology with small amount of right adnexal and pelvic free fluid seen.  No evidence of organized fluid collection or rim enhancing abscess.  Few prominent lymph nodes are seen in the region which may reflect mesenteric adenitis.  2. Suspected appendix is visualized and is unremarkable with no evidence of appendicitis.  Appendix appears removed from the aforementioned right lower quadrant/right adnexal inflammatory change.      Electronically signed by: Naga Herrera MD  Date:    02/18/2019  Time:    17:38             Narrative:    EXAMINATION:  CT ABDOMEN PELVIS WITH CONTRAST    CLINICAL HISTORY:  Cystitis vs pyelonephritis vs appendicitis;    TECHNIQUE:  Low  dose axial images, sagittal and coronal reformations were obtained from the lung bases to the pubic symphysis following the IV administration of 100 mL of Omnipaque 350 .  Oral contrast was not given.    COMPARISON:  None.    FINDINGS:  The visualized portion of the heart is unremarkable.  The lung bases are clear.    No significant hepatic abnormalities are identified.  There is no intra-or extrahepatic biliary ductal dilatation.  The gallbladder is unremarkable.  The stomach, pancreas, spleen, and adrenal glands are unremarkable.    Kidneys enhance normally.  No evidence of hydronephrosis.  Ureters are unremarkable along their courses.  Urinary bladder and uterus show no significant abnormalities.    Nonspecific inflammatory changes are seen within the lower quadrant, more pronounced within the right lower quadrant and right adnexal region.  Few prominent lymph nodes are seen in the region which do not meet CT criteria for enlargement.  Small amount of disorganized free fluid is seen, volume of which is not greater than normal expected physiologic volume of fluid.    Suspected appendix is visualized and is unremarkable and is removed from the aforementioned region of inflammatory change.  Distal ileal loops within the right lower quadrant course through the region of inflammatory change.  No evidence of abnormal wall thickening.  Visualized loops of small large bowel otherwise show no evidence of obstruction or inflammation.  No free air identified.    Aorta tapers normally.    No acute osseous abnormality identified. Subcutaneous soft tissue structures are unremarkable.                                 Medical Decision Making:   History:   Old Medical Records: I decided to obtain old medical records.  Initial Assessment:   Medical decision-making:    The patient received a medical screening exam. If performed, the EKG was independently evaluated by me and is pending final cardiology evaluation.  If performed, all  radiographic studies were independently evaluated by me and are pending final radiology evaluation. If labs were ordered, they were reviewed. Vital signs are independently assessed by me.  If performed, the pulse oximetry was independently evaluated by me.  I decided to obtain the patient's past medical record.  If available, I reviewed the patient's past medical record, including most recent labs and radiology reports.    ED Management:  Pt with failed outpt therapy for uti/pyelonephritis.   Admit for IV abx.    The results and physical exam findings were reviewed with the patient. Pt agrees with assessment, disposition and treatment plan and has no further questions or complaints at this time.    CINDI Lopez M.D. 11:03 AM 2/19/2019              Scribe Attestation:   Scribe #1: I performed the above scribed service and the documentation accurately describes the services I performed. I attest to the accuracy of the note.    Attending Attestation:           Physician Attestation for Scribe:  Physician Attestation Statement for Scribe #1: I, Michi Lopez MD, reviewed documentation, as scribed by Panchito Rodriguez in my presence, and it is both accurate and complete.                    Clinical Impression:   The primary encounter diagnosis was Pyelonephritis. A diagnosis of Fever was also pertinent to this visit.                             Michi Lopez MD  02/19/19 [id number]

## 2019-02-19 PROBLEM — R19.7 DIARRHEA IN ADULT PATIENT: Status: RESOLVED | Noted: 2019-02-19 | Resolved: 2019-02-19

## 2019-02-19 PROBLEM — K59.1 FUNCTIONAL DIARRHEA: Status: ACTIVE | Noted: 2019-02-19

## 2019-02-19 PROBLEM — R10.31 RIGHT LOWER QUADRANT PAIN: Status: ACTIVE | Noted: 2019-02-19

## 2019-02-19 PROBLEM — R10.32 LEFT LOWER QUADRANT PAIN: Status: ACTIVE | Noted: 2019-02-19

## 2019-02-19 PROBLEM — R19.7 DIARRHEA IN ADULT PATIENT: Status: ACTIVE | Noted: 2019-02-19

## 2019-02-19 LAB
ANION GAP SERPL CALC-SCNC: 8 MMOL/L
BASOPHILS # BLD AUTO: 0.02 K/UL
BASOPHILS NFR BLD: 0.3 %
BUN SERPL-MCNC: 7 MG/DL
C DIFF GDH STL QL: NEGATIVE
C DIFF TOX A+B STL QL IA: NEGATIVE
CALCIUM SERPL-MCNC: 8.5 MG/DL
CHLORIDE SERPL-SCNC: 108 MMOL/L
CO2 SERPL-SCNC: 20 MMOL/L
CREAT SERPL-MCNC: 0.9 MG/DL
DIFFERENTIAL METHOD: ABNORMAL
EOSINOPHIL # BLD AUTO: 0.1 K/UL
EOSINOPHIL NFR BLD: 1.6 %
ERYTHROCYTE [DISTWIDTH] IN BLOOD BY AUTOMATED COUNT: 13.9 %
EST. GFR  (AFRICAN AMERICAN): >60 ML/MIN/1.73 M^2
EST. GFR  (NON AFRICAN AMERICAN): >60 ML/MIN/1.73 M^2
GLUCOSE SERPL-MCNC: 95 MG/DL
HCT VFR BLD AUTO: 31.5 %
HGB BLD-MCNC: 9.8 G/DL
LACTATE SERPL-SCNC: 0.6 MMOL/L
LYMPHOCYTES # BLD AUTO: 0.7 K/UL
LYMPHOCYTES NFR BLD: 10.8 %
MCH RBC QN AUTO: 28.2 PG
MCHC RBC AUTO-ENTMCNC: 31.1 G/DL
MCV RBC AUTO: 91 FL
MONOCYTES # BLD AUTO: 0.7 K/UL
MONOCYTES NFR BLD: 11.1 %
NEUTROPHILS # BLD AUTO: 4.8 K/UL
NEUTROPHILS NFR BLD: 76.4 %
PLATELET # BLD AUTO: 162 K/UL
PMV BLD AUTO: 9.4 FL
POTASSIUM SERPL-SCNC: 4.2 MMOL/L
RBC # BLD AUTO: 3.47 M/UL
SODIUM SERPL-SCNC: 136 MMOL/L
WBC # BLD AUTO: 6.32 K/UL

## 2019-02-19 PROCEDURE — 25000003 PHARM REV CODE 250: Performed by: EMERGENCY MEDICINE

## 2019-02-19 PROCEDURE — 96376 TX/PRO/DX INJ SAME DRUG ADON: CPT

## 2019-02-19 PROCEDURE — 25500020 PHARM REV CODE 255: Performed by: HOSPITALIST

## 2019-02-19 PROCEDURE — 87449 NOS EACH ORGANISM AG IA: CPT

## 2019-02-19 PROCEDURE — 80048 BASIC METABOLIC PNL TOTAL CA: CPT

## 2019-02-19 PROCEDURE — 36415 COLL VENOUS BLD VENIPUNCTURE: CPT

## 2019-02-19 PROCEDURE — 63600175 PHARM REV CODE 636 W HCPCS: Performed by: EMERGENCY MEDICINE

## 2019-02-19 PROCEDURE — 11000001 HC ACUTE MED/SURG PRIVATE ROOM

## 2019-02-19 PROCEDURE — 63600175 PHARM REV CODE 636 W HCPCS: Performed by: HOSPITALIST

## 2019-02-19 PROCEDURE — 83605 ASSAY OF LACTIC ACID: CPT

## 2019-02-19 PROCEDURE — 25000003 PHARM REV CODE 250: Performed by: NURSE PRACTITIONER

## 2019-02-19 PROCEDURE — 85025 COMPLETE CBC W/AUTO DIFF WBC: CPT

## 2019-02-19 PROCEDURE — 25000003 PHARM REV CODE 250: Performed by: HOSPITALIST

## 2019-02-19 RX ORDER — PHENAZOPYRIDINE HYDROCHLORIDE 100 MG/1
100 TABLET, FILM COATED ORAL
Status: DISCONTINUED | OUTPATIENT
Start: 2019-02-19 | End: 2019-02-19

## 2019-02-19 RX ORDER — FERROUS SULFATE 325(65) MG
325 TABLET, DELAYED RELEASE (ENTERIC COATED) ORAL 2 TIMES DAILY
Status: DISCONTINUED | OUTPATIENT
Start: 2019-02-19 | End: 2019-02-22 | Stop reason: HOSPADM

## 2019-02-19 RX ORDER — LOPERAMIDE HYDROCHLORIDE 2 MG/1
2 CAPSULE ORAL 4 TIMES DAILY PRN
Status: DISCONTINUED | OUTPATIENT
Start: 2019-02-19 | End: 2019-02-22 | Stop reason: HOSPADM

## 2019-02-19 RX ORDER — LOPERAMIDE HYDROCHLORIDE 2 MG/1
4 CAPSULE ORAL ONCE
Status: COMPLETED | OUTPATIENT
Start: 2019-02-19 | End: 2019-02-19

## 2019-02-19 RX ORDER — ACETAMINOPHEN 500 MG
1000 TABLET ORAL ONCE
Status: COMPLETED | OUTPATIENT
Start: 2019-02-19 | End: 2019-02-19

## 2019-02-19 RX ORDER — PHENAZOPYRIDINE HYDROCHLORIDE 100 MG/1
100 TABLET, FILM COATED ORAL
Status: COMPLETED | OUTPATIENT
Start: 2019-02-20 | End: 2019-02-20

## 2019-02-19 RX ORDER — ACETAMINOPHEN 325 MG/1
650 TABLET ORAL EVERY 6 HOURS PRN
Status: DISCONTINUED | OUTPATIENT
Start: 2019-02-19 | End: 2019-02-20

## 2019-02-19 RX ORDER — ACETAMINOPHEN 10 MG/ML
1000 INJECTION, SOLUTION INTRAVENOUS EVERY 8 HOURS
Status: DISCONTINUED | OUTPATIENT
Start: 2019-02-19 | End: 2019-02-19

## 2019-02-19 RX ORDER — IBUPROFEN 400 MG/1
800 TABLET ORAL EVERY 6 HOURS PRN
Status: DISCONTINUED | OUTPATIENT
Start: 2019-02-19 | End: 2019-02-20

## 2019-02-19 RX ORDER — DICYCLOMINE HYDROCHLORIDE 10 MG/1
10 CAPSULE ORAL 4 TIMES DAILY
Status: DISCONTINUED | OUTPATIENT
Start: 2019-02-19 | End: 2019-02-22 | Stop reason: HOSPADM

## 2019-02-19 RX ADMIN — PHENAZOPYRIDINE HYDROCHLORIDE 100 MG: 100 TABLET ORAL at 04:02

## 2019-02-19 RX ADMIN — PHENAZOPYRIDINE HYDROCHLORIDE 100 MG: 100 TABLET ORAL at 10:02

## 2019-02-19 RX ADMIN — PROCHLORPERAZINE EDISYLATE 10 MG: 5 INJECTION INTRAMUSCULAR; INTRAVENOUS at 03:02

## 2019-02-19 RX ADMIN — ACETAMINOPHEN 650 MG: 325 TABLET, FILM COATED ORAL at 12:02

## 2019-02-19 RX ADMIN — SODIUM CHLORIDE: 0.9 INJECTION, SOLUTION INTRAVENOUS at 06:02

## 2019-02-19 RX ADMIN — ACETAMINOPHEN 1000 MG: 500 TABLET, FILM COATED ORAL at 06:02

## 2019-02-19 RX ADMIN — FERROUS SULFATE TAB EC 325 MG (65 MG FE EQUIVALENT) 325 MG: 325 (65 FE) TABLET DELAYED RESPONSE at 12:02

## 2019-02-19 RX ADMIN — DICYCLOMINE HYDROCHLORIDE 10 MG: 10 CAPSULE ORAL at 02:02

## 2019-02-19 RX ADMIN — KETOROLAC TROMETHAMINE 15 MG: 30 INJECTION, SOLUTION INTRAMUSCULAR at 03:02

## 2019-02-19 RX ADMIN — DICYCLOMINE HYDROCHLORIDE 10 MG: 10 CAPSULE ORAL at 04:02

## 2019-02-19 RX ADMIN — FERROUS SULFATE TAB EC 325 MG (65 MG FE EQUIVALENT) 325 MG: 325 (65 FE) TABLET DELAYED RESPONSE at 10:02

## 2019-02-19 RX ADMIN — KETOROLAC TROMETHAMINE 15 MG: 30 INJECTION, SOLUTION INTRAMUSCULAR at 10:02

## 2019-02-19 RX ADMIN — LOPERAMIDE HYDROCHLORIDE 4 MG: 2 CAPSULE ORAL at 04:02

## 2019-02-19 RX ADMIN — DICYCLOMINE HYDROCHLORIDE 10 MG: 10 CAPSULE ORAL at 10:02

## 2019-02-19 RX ADMIN — ONDANSETRON 4 MG: 2 INJECTION INTRAMUSCULAR; INTRAVENOUS at 12:02

## 2019-02-19 RX ADMIN — ONDANSETRON 4 MG: 2 INJECTION INTRAMUSCULAR; INTRAVENOUS at 04:02

## 2019-02-19 RX ADMIN — ACETAMINOPHEN 650 MG: 325 TABLET, FILM COATED ORAL at 04:02

## 2019-02-19 RX ADMIN — PIPERACILLIN AND TAZOBACTAM 4.5 G: 4; .5 INJECTION, POWDER, LYOPHILIZED, FOR SOLUTION INTRAVENOUS; PARENTERAL at 11:02

## 2019-02-19 RX ADMIN — PROCHLORPERAZINE EDISYLATE 10 MG: 5 INJECTION INTRAMUSCULAR; INTRAVENOUS at 06:02

## 2019-02-19 RX ADMIN — PIPERACILLIN AND TAZOBACTAM 4.5 G: 4; .5 INJECTION, POWDER, LYOPHILIZED, FOR SOLUTION INTRAVENOUS; PARENTERAL at 10:02

## 2019-02-19 RX ADMIN — IOHEXOL 100 ML: 350 INJECTION, SOLUTION INTRAVENOUS at 08:02

## 2019-02-19 RX ADMIN — PIPERACILLIN AND TAZOBACTAM 4.5 G: 4; .5 INJECTION, POWDER, LYOPHILIZED, FOR SOLUTION INTRAVENOUS; PARENTERAL at 04:02

## 2019-02-19 RX ADMIN — IBUPROFEN 800 MG: 400 TABLET, FILM COATED ORAL at 04:02

## 2019-02-19 NOTE — PLAN OF CARE
Problem: UTI (Urinary Tract Infection)  Goal: Improved Infection Symptoms    Intervention: Prevent and Manage Infection Progression   02/19/19 0516   Prevent or Manage Infection   Fever Reduction/Comfort Measures medication administered   Infection Management cultures obtained and sent to lab         Comments: Patient remained free of injury throughout the shift. Complaints of abdominal pain treated with prn analgesic. Patient with noted nausea and vomiting in which she was treated with available prn antiemetics with relief obtained. Throughout the shift patient spiked temp twice in which she was treated with prn tylenol and temp came down. Plan to continue IV fluids, IV antibiotics, and pain and nausea management. Patient and family updated on plan of care and verbalized understanding. Call light in reach and patient instructed to inform the nurse if anything is needed. Patient stable and will continue to be monitored.

## 2019-02-19 NOTE — HPI
23 y.o. female with obesity and tobacco use presents with a complaint of body aches, fever (T max 103.6 F), flank pain, lower abdominal pain, nausea, vomiting for the past week.  Reports she was diagnosed with UTI 6 days ago and started on Bactrim with no improvement of symptoms, went to urgent care yesterday and was prescribed Keflex.  She is also taken azo, Tylenol, and ibuprofen.  She denies cough, SOB, palpitations, dizziness, syncope, diarrhea, bloody or black stools.  In the ED her urinalysis showed evidence of infection.  Routine labs show mild hyponatremia, mild metabolic acidosis, and mild anemia.  UPT negative.  CT abdomen pelvis showed inflammatory changes within the lower abdomen, more pronounced within the right lower quadrant and right adnexal region of uncertain etiology with small amount of right adnexal and pelvic free fluid seen.  No evidence of organized fluid collection or rim enhancing abscess.  Few prominent lymph nodes are seen in the region which may reflect mesenteric adenitis.  Pelvic ultrasound pending.  Blood in urine cultures were obtained.  IV fluids and IV antibiotics were initiated.  Placed in observation for further evaluation and treatment.

## 2019-02-19 NOTE — ASSESSMENT & PLAN NOTE
Fever, flank pain, evidence of infection on urinalysis.  She meets criteria for sepsis with fever, tachycardia, tachypnea, and urinary source.  Lactic acid normal.  qSOFA score 1.  Continue IV fluids and IV antibiotics.  Cultures pending.     2/19/19 Spoke with [facility] [phone]. Patient was on Bactrim from Wed-Sun then started on Keflex on Sun. Urgent care Urine culture from 2/17 still in process. I will follow up. Stop IVF as tolerating orals. Continue abx. Start pyridium. Follow up on culture here as well.

## 2019-02-19 NOTE — ASSESSMENT & PLAN NOTE
Normocytic, normochromic.  No baseline on file but has been told in the past that she was anemic and has taken iron supplements in the past.  She denies obvious observed bleeding.  Iron studies pending.  CBC in a.m.

## 2019-02-19 NOTE — ASSESSMENT & PLAN NOTE
Fever, flank pain, evidence of infection on urinalysis.  She meets criteria for sepsis with fever, tachycardia, tachypnea, and urinary source.  Lactic acid normal.  qSOFA score 1.  Continue IV fluids and IV antibiotics.  Cultures pending.

## 2019-02-19 NOTE — ASSESSMENT & PLAN NOTE
Normocytic, normochromic.  No baseline on file but has been told in the past that she was anemic and has taken iron supplements in the past.  She denies obvious observed bleeding.  Low Iron studies. Start ferrous sulfate

## 2019-02-19 NOTE — PROGRESS NOTES
"[facility] Hospital Medicine  Progress Note    Patient Name: Frankie Garcia  MRN: [id number]  Patient Class: OP- Observation   Admission Date: 2/18/2019  Length of Stay: 0 days  Attending Physician: Little Danielle MD  Primary Care Provider: Michelle Hadley MD        Subjective:     Principal Problem:Pyelonephritis    HPI:  23 y.o. female with obesity and tobacco use presents with a complaint of body aches, fever (T max 103.6 F), flank pain, lower abdominal pain, nausea, vomiting for the past week.  Reports she was diagnosed with UTI 6 days ago and started on Bactrim with no improvement of symptoms, went to urgent care yesterday and was prescribed Keflex.  She is also taken azo, Tylenol, and ibuprofen.  She denies cough, SOB, palpitations, dizziness, syncope, diarrhea, bloody or black stools.  In the ED her urinalysis showed evidence of infection.  Routine labs show mild hyponatremia, mild metabolic acidosis, and mild anemia.  UPT negative.  CT abdomen pelvis showed inflammatory changes within the lower abdomen, more pronounced within the right lower quadrant and right adnexal region of uncertain etiology with small amount of right adnexal and pelvic free fluid seen.  No evidence of organized fluid collection or rim enhancing abscess.  Few prominent lymph nodes are seen in the region which may reflect mesenteric adenitis.  Pelvic ultrasound pending.  Blood in urine cultures were obtained.  IV fluids and IV antibiotics were initiated.  Placed in observation for further evaluation and treatment.      Hospital Course:  Patient admitted for pyeloneophritis. CT AP with Contrast "Inflammatory changes seen within the lower abdomen, more pronounced within the right lower quadrant and right adnexal region of uncertain etiology with small amount of right adnexal and pelvic free fluid seen.  No evidence of organized fluid collection or rim enhancing abscess.  Few prominent lymph nodes are seen in the " "region which may reflect mesenteric adenitis." CT also showed "distal ileal loops within the right lower quadrant course through the region of inflammatory change."    US of pelviic "Free fluid seen in the pelvis which is slightly greater than expected normal physiologic volume.Otherwise no significant abnormalities identified."     Addendum 1924  Patient continues to have diarrhea. C diff negative. Persistent fever and continue lower abdominal pain R>L. Urine culture remains ngsf. Urine culture from [facility] on 2/17/10 pending. Plan to continue zosyn. Concern for early appendicitis?        Interval History: Reports abdominal and flank pain improved. Fever 101.5 at 440 am.     Review of Systems   Constitutional: Positive for chills and fever. Negative for fatigue.   Eyes: Negative for photophobia and visual disturbance.   Respiratory: Negative for cough and shortness of breath.    Cardiovascular: Negative for chest pain, palpitations and leg swelling.   Gastrointestinal: Positive for abdominal pain, nausea and vomiting. Negative for diarrhea.   Genitourinary: Positive for flank pain. Negative for dysuria, frequency and urgency.   Musculoskeletal: Positive for myalgias.   Skin: Negative for pallor, rash and wound.   Neurological: Negative for light-headedness and headaches.   Psychiatric/Behavioral: Negative for confusion and decreased concentration.     Objective:     Vital Signs (Most Recent):  Temp: 99.4 °F (37.4 °C) (02/19/19 1101)  Pulse: 90 (02/19/19 1101)  Resp: 18 (02/19/19 1101)  BP: 128/68 (02/19/19 1101)  SpO2: 99 % (02/19/19 1101) Vital Signs (24h Range):  Temp:  [98.5 °F (36.9 °C)-101.9 °F (38.8 °C)] 99.4 °F (37.4 °C)  Pulse:  [] 90  Resp:  [17-22] 18  SpO2:  [93 %-99 %] 99 %  BP: (100-140)/(55-77) 128/68     Weight: 133.4 kg (294 lb 1.5 oz)  Body mass index is 48.94 kg/m².    Intake/Output Summary (Last 24 hours) at 2/19/2019 1140  Last data filed at 2/19/2019 0600  Gross per 24 " hour   Intake 1547.92 ml   Output 20 ml   Net 1527.92 ml      Physical Exam   Constitutional: She is oriented to person, place, and time. She appears well-developed and well-nourished. No distress.   HENT:   Head: Normocephalic and atraumatic.   Eyes: Conjunctivae and EOM are normal. Pupils are equal, round, and reactive to light.   Neck: Normal range of motion. Neck supple.   Cardiovascular: Normal rate, regular rhythm and intact distal pulses.   Pulmonary/Chest: Effort normal and breath sounds normal. No respiratory distress. She has no wheezes.   Abdominal: Soft. Bowel sounds are normal. She exhibits no distension. There is tenderness. There is CVA tenderness. There is no rebound and no guarding.   Musculoskeletal: Normal range of motion. She exhibits no edema or tenderness.   Neurological: She is alert and oriented to person, place, and time.   Skin: Skin is warm and dry.   Psychiatric: She has a normal mood and affect. Thought content normal.   Nursing note and vitals reviewed.      Significant Labs: All pertinent labs within the past 24 hours have been reviewed.    Significant Imaging: I have reviewed and interpreted all pertinent imaging results/findings within the past 24 hours.    Assessment/Plan:      * Pyelonephritis  Right lower Quadrant Abdominal Pain    Fever, flank pain, evidence of infection on urinalysis.  She meets criteria for sepsis with fever, tachycardia, tachypnea, and urinary source.  Lactic acid normal.  qSOFA score 1.  Continue IV fluids and IV antibiotics.  Cultures pending.     2/19/19 Spoke with [facility] [phone]. Patient was on Bactrim from Wed-Sun then started on Keflex on Sun. Urgent care Urine culture from 2/17 still in process. I will follow up. Stop IVF as tolerating orals. Continue abx. Start pyridium. Follow up on culture here as well.     Addendum 1924  Persistent fever and continue lower abdominal pain R>L. Blood and urine culture remains ngsf. Continue zosyn, IVF  and tylenol/ibuprofen for fever. Concern for early appendicitis? Will repeat CT abdomen pelvis. Obtain lactic acid.      Diarrhea    Possible due to antibiotics or GI process as above. C diff negative. Plan as above #2.    Anemia    Normocytic, normochromic.  No baseline on file but has been told in the past that she was anemic and has taken iron supplements in the past.  She denies obvious observed bleeding.  Low Iron studies. Start ferrous sulfate     Hyponatremia    Mild, asymptomatic, likely hypovolemic.  Stop IVF. Resolved.      Cigarette smoker    5 minutes spent counseling the patient on smoking cessation and she has not smoked in a few weeks due to feeling bad and is currently ready to stop smoking. She will be offereded a nicotine transdermal patch while hospitalized and monitored for withdrawal.  Will provide additional smoking cessation counseling prior to discharge.      Class 3 severe obesity due to excess calories without serious comorbidity with body mass index (BMI) of 40.0 to 44.9 in adult    BMI Body mass index is 40.77 kg/m².  Patient counseled on risks obesity imparts on overall health. Urged toward diet and lifestyle modifications to aid in weight reduction.        VTE Risk Mitigation (From admission, onward)        Ordered     enoxaparin injection 40 mg  Daily      02/18/19 2041     IP VTE HIGH RISK PATIENT  Once      02/18/19 2041              Siri Rosenberg NP  Department of Hospital Medicine   [facility]

## 2019-02-19 NOTE — ASSESSMENT & PLAN NOTE
5 minutes spent counseling the patient on smoking cessation and she has not smoked in a few weeks due to feeling bad and is currently ready to stop smoking. She will be offereded a nicotine transdermal patch while hospitalized and monitored for withdrawal.  Will provide additional smoking cessation counseling prior to discharge.

## 2019-02-19 NOTE — PLAN OF CARE
To patient's room to discuss patient managing her care at home.      TN Role Explained.  Patient identified by using 2 identifiers:  Name and date of birth    Patient stated that her mother, Naila WILL HELP AT HOME WITH her RECOVERY.      TN name and contact info placed on the communication board    Preferred Pharm[place]:  [facility] [place] [address] [address]. [facility] [address]  Phone: [phone] Fax: [phone]       02/19/19 1014   Discharge Assessment   Assessment Type Discharge Planning Assessment   Confirmed/corrected address and phone number on facesheet? Yes   Assessment information obtained from? Patient   Expected Length of Stay (days) 2   Communicated expected length of stay with patient/caregiver yes   Prior to hospitilization cognitive status: Alert/Oriented   Prior to hospitalization functional status: Independent   Current cognitive status: Alert/Oriented   Current Functional Status: Needs Assistance  (feeling weak)   Lives With alone   Able to Return to Prior Arrangements yes   Is patient able to care for self after discharge? Yes   Patient's perception of discharge disposition home or selfcare   Readmission Within the Last 30 Days no previous admission in last 30 days   Patient currently being followed by outpatient case management? No   Patient currently receives any other outside agency services? No   Equipment Currently Used at Home none   Do you have any problems affording any of your prescribed medications? No   Is the patient taking medications as prescribed? yes   Does the patient have transportation home? Yes   Transportation Anticipated family or friend will provide   Does the patient receive services at the [facility]? No   Discharge Plan A Home   Discharge Plan B Home with family   DME Needed Upon Discharge  none   Patient/Family in Agreement with Plan yes

## 2019-02-19 NOTE — SUBJECTIVE & OBJECTIVE
Interval History: Reports abdominal and flank pain improved. Fever 101.5 at 440 am.     Review of Systems   Constitutional: Positive for chills and fever. Negative for fatigue.   Eyes: Negative for photophobia and visual disturbance.   Respiratory: Negative for cough and shortness of breath.    Cardiovascular: Negative for chest pain, palpitations and leg swelling.   Gastrointestinal: Positive for abdominal pain, nausea and vomiting. Negative for diarrhea.   Genitourinary: Positive for flank pain. Negative for dysuria, frequency and urgency.   Musculoskeletal: Positive for myalgias.   Skin: Negative for pallor, rash and wound.   Neurological: Negative for light-headedness and headaches.   Psychiatric/Behavioral: Negative for confusion and decreased concentration.     Objective:     Vital Signs (Most Recent):  Temp: 99.4 °F (37.4 °C) (02/19/19 1101)  Pulse: 90 (02/19/19 1101)  Resp: 18 (02/19/19 1101)  BP: 128/68 (02/19/19 1101)  SpO2: 99 % (02/19/19 1101) Vital Signs (24h Range):  Temp:  [98.5 °F (36.9 °C)-101.9 °F (38.8 °C)] 99.4 °F (37.4 °C)  Pulse:  [] 90  Resp:  [17-22] 18  SpO2:  [93 %-99 %] 99 %  BP: (100-140)/(55-77) 128/68     Weight: 133.4 kg (294 lb 1.5 oz)  Body mass index is 48.94 kg/m².    Intake/Output Summary (Last 24 hours) at 2/19/2019 1140  Last data filed at 2/19/2019 0600  Gross per 24 hour   Intake 1547.92 ml   Output 20 ml   Net 1527.92 ml      Physical Exam   Constitutional: She is oriented to person, place, and time. She appears well-developed and well-nourished. No distress.   HENT:   Head: Normocephalic and atraumatic.   Eyes: Conjunctivae and EOM are normal. Pupils are equal, round, and reactive to light.   Neck: Normal range of motion. Neck supple.   Cardiovascular: Normal rate, regular rhythm and intact distal pulses.   Pulmonary/Chest: Effort normal and breath sounds normal. No respiratory distress. She has no wheezes.   Abdominal: Soft. Bowel sounds are normal. She exhibits no  distension. There is tenderness. There is CVA tenderness. There is no rebound and no guarding.   Musculoskeletal: Normal range of motion. She exhibits no edema or tenderness.   Neurological: She is alert and oriented to person, place, and time.   Skin: Skin is warm and dry.   Psychiatric: She has a normal mood and affect. Thought content normal.   Nursing note and vitals reviewed.      Significant Labs: All pertinent labs within the past 24 hours have been reviewed.    Significant Imaging: I have reviewed and interpreted all pertinent imaging results/findings within the past 24 hours.

## 2019-02-19 NOTE — SUBJECTIVE & OBJECTIVE
Past Medical History:   Diagnosis Date    Cigarette smoker     1ppd    Influenza 01/2019    Pyelonephritis 02/18/2019       Past Surgical History:   Procedure Laterality Date    NO PAST SURGERIES  02/18/2019       Review of patient's allergies indicates:  No Known Allergies    No current facility-administered medications on file prior to encounter.      No current outpatient medications on file prior to encounter.     Family History     Problem Relation (Age of Onset)    Arthritis Father    Diabetes Maternal Aunt        Tobacco Use    Smoking status: Current Every Day Smoker     Packs/day: 0.07     Years: 1.00     Pack years: 0.07    Smokeless tobacco: Never Used   Substance and Sexual Activity    Alcohol use: Yes     Alcohol/week: 0.0 oz     Comment: socially    Drug use: No    Sexual activity: Yes     Partners: Female     Birth control/protection: None     Review of Systems   Constitutional: Positive for chills and fever. Negative for fatigue.   Eyes: Negative for photophobia and visual disturbance.   Respiratory: Negative for cough and shortness of breath.    Cardiovascular: Negative for chest pain, palpitations and leg swelling.   Gastrointestinal: Positive for abdominal pain, nausea and vomiting. Negative for diarrhea.   Genitourinary: Positive for flank pain. Negative for dysuria, frequency and urgency.   Musculoskeletal: Positive for myalgias.   Skin: Negative for pallor, rash and wound.   Neurological: Negative for light-headedness and headaches.   Psychiatric/Behavioral: Negative for confusion and decreased concentration.     Objective:     Vital Signs (Most Recent):  Temp: 99.2 °F (37.3 °C) (02/18/19 1916)  Pulse: 91 (02/18/19 1844)  Resp: (!) 21 (02/18/19 1637)  BP: (!) 140/67 (02/18/19 1831)  SpO2: 99 % (02/18/19 1844) Vital Signs (24h Range):  Temp:  [99.2 °F (37.3 °C)-99.5 °F (37.5 °C)] 99.2 °F (37.3 °C)  Pulse:  [91-96] 91  Resp:  [20-22] 21  SpO2:  [96 %-99 %] 99 %  BP: (125-140)/(67-77)  140/67     Weight: 111.1 kg (245 lb)  Body mass index is 40.77 kg/m².    Physical Exam   Constitutional: She is oriented to person, place, and time. She appears well-developed and well-nourished. No distress.   HENT:   Head: Normocephalic and atraumatic.   Right Ear: External ear normal.   Left Ear: External ear normal.   Nose: Nose normal.   Mouth/Throat: Oropharynx is clear and moist.   Eyes: Conjunctivae and EOM are normal. Pupils are equal, round, and reactive to light.   Neck: Normal range of motion. Neck supple.   Cardiovascular: Normal rate, regular rhythm and intact distal pulses.   Pulmonary/Chest: Effort normal and breath sounds normal. No respiratory distress. She has no wheezes.   Abdominal: Soft. Bowel sounds are normal. She exhibits no distension. There is tenderness. There is CVA tenderness. There is no rebound and no guarding.   No palpable hepatomegaly or splenomegaly    Musculoskeletal: Normal range of motion. She exhibits no edema or tenderness.   Neurological: She is alert and oriented to person, place, and time.   Skin: Skin is warm and dry.   Psychiatric: She has a normal mood and affect. Thought content normal.   Nursing note and vitals reviewed.        CRANIAL NERVES     CN III, IV, VI   Pupils are equal, round, and reactive to light.  Extraocular motions are normal.        Significant Labs: All pertinent labs within the past 24 hours have been reviewed.    Significant Imaging: I have reviewed all pertinent imaging results/findings within the past 24 hours.

## 2019-02-19 NOTE — ASSESSMENT & PLAN NOTE
BMI Body mass index is 40.77 kg/m².  Patient counseled on risks obesity imparts on overall health. Urged toward diet and lifestyle modifications to aid in weight reduction.

## 2019-02-19 NOTE — H&P
[facility] Hospital Medicine  History & Physical    Patient Name: Frankie Garcia  MRN: [id number]  Admission Date: 2/18/2019  Attending Physician: Jodi Tang MD   Primary Care Provider: Michelle Hadley MD         Patient information was obtained from patient, parent, past medical records and ER records.     Subjective:     Principal Problem:Pyelonephritis    Chief Complaint:   Chief Complaint   Patient presents with    Abdominal Pain     Pt was dx with UTI on Wed and started abx. Abx was changed yesterday from urgent care. Pt reports continued fever and increasing abdominal pain and back pain today. Pt last had Tylenol 1 1/2 - 2 hrs ago.     Back Pain        HPI: 23 y.o. female with obesity and tobacco use presents with a complaint of body aches, fever (T max 103.6 F), flank pain, lower abdominal pain, nausea, vomiting for the past week.  Reports she was diagnosed with UTI 6 days ago and started on Bactrim with no improvement of symptoms, went to urgent care yesterday and was prescribed Keflex.  She is also taken azo, Tylenol, and ibuprofen.  She denies cough, SOB, palpitations, dizziness, syncope, diarrhea, bloody or black stools.  In the ED her urinalysis showed evidence of infection.  Routine labs show mild hyponatremia, mild metabolic acidosis, and mild anemia.  UPT negative.  CT abdomen pelvis showed inflammatory changes within the lower abdomen, more pronounced within the right lower quadrant and right adnexal region of uncertain etiology with small amount of right adnexal and pelvic free fluid seen.  No evidence of organized fluid collection or rim enhancing abscess.  Few prominent lymph nodes are seen in the region which may reflect mesenteric adenitis.  Pelvic ultrasound pending.  Blood in urine cultures were obtained.  IV fluids and IV antibiotics were initiated.  Placed in observation for further evaluation and treatment.      Past Medical History:   Diagnosis Date     Cigarette smoker     1ppd    Influenza 01/2019    Pyelonephritis 02/18/2019       Past Surgical History:   Procedure Laterality Date    NO PAST SURGERIES  02/18/2019       Review of patient's allergies indicates:  No Known Allergies    No current facility-administered medications on file prior to encounter.      No current outpatient medications on file prior to encounter.     Family History     Problem Relation (Age of Onset)    Arthritis Father    Diabetes Maternal Aunt        Tobacco Use    Smoking status: Current Every Day Smoker     Packs/day: 0.07     Years: 1.00     Pack years: 0.07    Smokeless tobacco: Never Used   Substance and Sexual Activity    Alcohol use: Yes     Alcohol/week: 0.0 oz     Comment: socially    Drug use: No    Sexual activity: Yes     Partners: Female     Birth control/protection: None     Review of Systems   Constitutional: Positive for chills and fever. Negative for fatigue.   Eyes: Negative for photophobia and visual disturbance.   Respiratory: Negative for cough and shortness of breath.    Cardiovascular: Negative for chest pain, palpitations and leg swelling.   Gastrointestinal: Positive for abdominal pain, nausea and vomiting. Negative for diarrhea.   Genitourinary: Positive for flank pain. Negative for dysuria, frequency and urgency.   Musculoskeletal: Positive for myalgias.   Skin: Negative for pallor, rash and wound.   Neurological: Negative for light-headedness and headaches.   Psychiatric/Behavioral: Negative for confusion and decreased concentration.     Objective:     Vital Signs (Most Recent):  Temp: 99.2 °F (37.3 °C) (02/18/19 1916)  Pulse: 91 (02/18/19 1844)  Resp: (!) 21 (02/18/19 1637)  BP: (!) 140/67 (02/18/19 1831)  SpO2: 99 % (02/18/19 1844) Vital Signs (24h Range):  Temp:  [99.2 °F (37.3 °C)-99.5 °F (37.5 °C)] 99.2 °F (37.3 °C)  Pulse:  [91-96] 91  Resp:  [20-22] 21  SpO2:  [96 %-99 %] 99 %  BP: (125-140)/(67-77) 140/67     Weight: 111.1 kg (245 lb)  Body  mass index is 40.77 kg/m².    Physical Exam   Constitutional: She is oriented to person, place, and time. She appears well-developed and well-nourished. No distress.   HENT:   Head: Normocephalic and atraumatic.   Right Ear: External ear normal.   Left Ear: External ear normal.   Nose: Nose normal.   Mouth/Throat: Oropharynx is clear and moist.   Eyes: Conjunctivae and EOM are normal. Pupils are equal, round, and reactive to light.   Neck: Normal range of motion. Neck supple.   Cardiovascular: Normal rate, regular rhythm and intact distal pulses.   Pulmonary/Chest: Effort normal and breath sounds normal. No respiratory distress. She has no wheezes.   Abdominal: Soft. Bowel sounds are normal. She exhibits no distension. There is tenderness. There is CVA tenderness. There is no rebound and no guarding.   No palpable hepatomegaly or splenomegaly    Musculoskeletal: Normal range of motion. She exhibits no edema or tenderness.   Neurological: She is alert and oriented to person, place, and time.   Skin: Skin is warm and dry.   Psychiatric: She has a normal mood and affect. Thought content normal.   Nursing note and vitals reviewed.        CRANIAL NERVES     CN III, IV, VI   Pupils are equal, round, and reactive to light.  Extraocular motions are normal.        Significant Labs: All pertinent labs within the past 24 hours have been reviewed.    Significant Imaging: I have reviewed all pertinent imaging results/findings within the past 24 hours.    Assessment/Plan:     * Pyelonephritis    Fever, flank pain, evidence of infection on urinalysis.  She meets criteria for sepsis with fever, tachycardia, tachypnea, and urinary source.  Lactic acid normal.  qSOFA score 1.  Continue IV fluids and IV antibiotics.  Cultures pending.     Mesenteric lymphadenitis    Per CT, self-limiting, continue supportive care.     Anemia    Normocytic, normochromic.  No baseline on file but has been told in the past that she was anemic and has  taken iron supplements in the past.  She denies obvious observed bleeding.  Iron studies pending.  CBC in a.m.     Hyponatremia    Mild, asymptomatic, likely hypovolemic.  Continue IV fluids.  BMP in a.m.     Cigarette smoker    5 minutes spent counseling the patient on smoking cessation and she has not smoked in a few weeks due to feeling bad and is currently ready to stop smoking. She will be offereded a nicotine transdermal patch while hospitalized and monitored for withdrawal.  Will provide additional smoking cessation counseling prior to discharge.      Class 3 severe obesity due to excess calories without serious comorbidity with body mass index (BMI) of 40.0 to 44.9 in adult    BMI Body mass index is 40.77 kg/m².  Patient counseled on risks obesity imparts on overall health. Urged toward diet and lifestyle modifications to aid in weight reduction.        VTE Risk Mitigation (From admission, onward)        Ordered     enoxaparin injection 40 mg  Daily      02/18/19 2041     IP VTE HIGH RISK PATIENT  Once      02/18/19 2041        Jaime Alvarado Jr., APRN, AGACNP-BC  Hospitalist - Department of Hospital Medicine  [facility] [address] [facility] [place] [id number]  Office #: [phone]; Pager #: [phone]

## 2019-02-19 NOTE — HOSPITAL COURSE
"Patient dmitted for pyeloneophritis. CT AP with Contrast "Inflammatory changes seen within the lower abdomen, more pronounced within the right lower quadrant and right adnexal region of uncertain etiology with small amount of right adnexal and pelvic free fluid seen.  No evidence of organized fluid collection or rim enhancing abscess.  Few prominent lymph nodes are seen in the region which may reflect mesenteric adenitis." CT also showed "distal ileal loops within the right lower quadrant course through the region of inflammatory change."  US of pelviic "Free fluid seen in the pelvis which is slightly greater than expected normal physiologic volume.Otherwise no significant abnormalities identified."   Patient with persistent fevers.  Gyn and ID consulted.  No evidence of PID per Gyn.  Continue treatment of pyelonephritis per ID.  Zosyn changed to Rocephin.  All cultures during hospital stay were negative.  Cultures from Urgent Care were also negative.  ID followed and still think probable pyelonephritis.  Ruptured ovarian cyst on differential.  Fevers have finally resolved.  ID recommending 14 days of Cipro.  Patient with significant elevation of inflammatory markers.  Discussed other possible etiologies of non infectious fevers (autoimmune disease?).  Suggested going to [facility] [place] if fevers persists where she could get Rheumatology input.  Hopefully this is all related to pyelo and everything will resolve with ABx's.  She is currently afebrile and hemodynamically stable.  She will be discharged home to follow up with PCP.  "

## 2019-02-20 ENCOUNTER — PATIENT OUTREACH (OUTPATIENT)
Dept: ADMINISTRATIVE | Facility: HOSPITAL | Age: 24
End: 2019-02-20

## 2019-02-20 ENCOUNTER — PATIENT MESSAGE (OUTPATIENT)
Dept: ADMINISTRATIVE | Facility: HOSPITAL | Age: 24
End: 2019-02-20

## 2019-02-20 LAB
ANION GAP SERPL CALC-SCNC: 8 MMOL/L
BACTERIA #/AREA URNS HPF: ABNORMAL /HPF
BACTERIA UR CULT: NO GROWTH
BASOPHILS # BLD AUTO: 0.01 K/UL
BASOPHILS NFR BLD: 0.2 %
BILIRUB UR QL STRIP: NEGATIVE
BUN SERPL-MCNC: 4 MG/DL
CALCIUM SERPL-MCNC: 8.8 MG/DL
CHLORIDE SERPL-SCNC: 108 MMOL/L
CLARITY UR: CLEAR
CO2 SERPL-SCNC: 23 MMOL/L
COLOR UR: ABNORMAL
CREAT SERPL-MCNC: 0.8 MG/DL
DIFFERENTIAL METHOD: ABNORMAL
EOSINOPHIL # BLD AUTO: 0.1 K/UL
EOSINOPHIL NFR BLD: 1.8 %
ERYTHROCYTE [DISTWIDTH] IN BLOOD BY AUTOMATED COUNT: 13.6 %
EST. GFR  (AFRICAN AMERICAN): >60 ML/MIN/1.73 M^2
EST. GFR  (NON AFRICAN AMERICAN): >60 ML/MIN/1.73 M^2
GLUCOSE SERPL-MCNC: 107 MG/DL
GLUCOSE UR QL STRIP: NEGATIVE
HCT VFR BLD AUTO: 29.5 %
HGB BLD-MCNC: 9.3 G/DL
HGB UR QL STRIP: ABNORMAL
KETONES UR QL STRIP: ABNORMAL
LEUKOCYTE ESTERASE UR QL STRIP: ABNORMAL
LYMPHOCYTES # BLD AUTO: 0.7 K/UL
LYMPHOCYTES NFR BLD: 14.2 %
MCH RBC QN AUTO: 27.8 PG
MCHC RBC AUTO-ENTMCNC: 31.5 G/DL
MCV RBC AUTO: 88 FL
MICROSCOPIC COMMENT: ABNORMAL
MONOCYTES # BLD AUTO: 0.5 K/UL
MONOCYTES NFR BLD: 9.1 %
NEUTROPHILS # BLD AUTO: 3.7 K/UL
NEUTROPHILS NFR BLD: 74.7 %
NITRITE UR QL STRIP: POSITIVE
PH UR STRIP: 5 [PH] (ref 5–8)
PLATELET # BLD AUTO: 156 K/UL
PMV BLD AUTO: 9.7 FL
POTASSIUM SERPL-SCNC: 3.5 MMOL/L
PROT UR QL STRIP: NEGATIVE
RBC # BLD AUTO: 3.35 M/UL
RBC #/AREA URNS HPF: 3 /HPF (ref 0–4)
SODIUM SERPL-SCNC: 139 MMOL/L
SP GR UR STRIP: 1.03 (ref 1–1.03)
SQUAMOUS #/AREA URNS HPF: ABNORMAL /HPF
URN SPEC COLLECT METH UR: ABNORMAL
UROBILINOGEN UR STRIP-ACNC: NEGATIVE EU/DL
WBC # BLD AUTO: 4.92 K/UL
WBC #/AREA STL HPF: ABNORMAL /[HPF]
WBC #/AREA URNS HPF: 30 /HPF (ref 0–5)

## 2019-02-20 PROCEDURE — 25000003 PHARM REV CODE 250: Performed by: NURSE PRACTITIONER

## 2019-02-20 PROCEDURE — 86592 SYPHILIS TEST NON-TREP QUAL: CPT

## 2019-02-20 PROCEDURE — 87046 STOOL CULTR AEROBIC BACT EA: CPT | Mod: 59

## 2019-02-20 PROCEDURE — 25000003 PHARM REV CODE 250: Performed by: HOSPITALIST

## 2019-02-20 PROCEDURE — 87491 CHLMYD TRACH DNA AMP PROBE: CPT

## 2019-02-20 PROCEDURE — 86703 HIV-1/HIV-2 1 RESULT ANTBDY: CPT

## 2019-02-20 PROCEDURE — 99499 NO LOS: ICD-10-PCS | Mod: ,,, | Performed by: PHYSICIAN ASSISTANT

## 2019-02-20 PROCEDURE — 99223 1ST HOSP IP/OBS HIGH 75: CPT | Mod: ,,, | Performed by: INTERNAL MEDICINE

## 2019-02-20 PROCEDURE — 21400001 HC TELEMETRY ROOM

## 2019-02-20 PROCEDURE — 87086 URINE CULTURE/COLONY COUNT: CPT

## 2019-02-20 PROCEDURE — 87427 SHIGA-LIKE TOXIN AG IA: CPT

## 2019-02-20 PROCEDURE — 85025 COMPLETE CBC W/AUTO DIFF WBC: CPT

## 2019-02-20 PROCEDURE — 81000 URINALYSIS NONAUTO W/SCOPE: CPT

## 2019-02-20 PROCEDURE — 99499 UNLISTED E&M SERVICE: CPT | Mod: ,,, | Performed by: PHYSICIAN ASSISTANT

## 2019-02-20 PROCEDURE — 89055 LEUKOCYTE ASSESSMENT FECAL: CPT

## 2019-02-20 PROCEDURE — 87209 SMEAR COMPLEX STAIN: CPT

## 2019-02-20 PROCEDURE — 80048 BASIC METABOLIC PNL TOTAL CA: CPT

## 2019-02-20 PROCEDURE — 25000003 PHARM REV CODE 250: Performed by: EMERGENCY MEDICINE

## 2019-02-20 PROCEDURE — 63600175 PHARM REV CODE 636 W HCPCS: Performed by: HOSPITALIST

## 2019-02-20 PROCEDURE — 63600175 PHARM REV CODE 636 W HCPCS: Performed by: EMERGENCY MEDICINE

## 2019-02-20 PROCEDURE — 36415 COLL VENOUS BLD VENIPUNCTURE: CPT

## 2019-02-20 PROCEDURE — 87045 FECES CULTURE AEROBIC BACT: CPT

## 2019-02-20 PROCEDURE — 99223 PR INITIAL HOSPITAL CARE,LEVL III: ICD-10-PCS | Mod: ,,, | Performed by: INTERNAL MEDICINE

## 2019-02-20 RX ORDER — ACETAMINOPHEN 325 MG/1
650 TABLET ORAL EVERY 4 HOURS PRN
Status: DISCONTINUED | OUTPATIENT
Start: 2019-02-20 | End: 2019-02-22 | Stop reason: HOSPADM

## 2019-02-20 RX ORDER — KETOROLAC TROMETHAMINE 30 MG/ML
15 INJECTION, SOLUTION INTRAMUSCULAR; INTRAVENOUS EVERY 6 HOURS PRN
Status: ACTIVE | OUTPATIENT
Start: 2019-02-20 | End: 2019-02-21

## 2019-02-20 RX ORDER — PANTOPRAZOLE SODIUM 40 MG/1
40 TABLET, DELAYED RELEASE ORAL DAILY
Status: DISCONTINUED | OUTPATIENT
Start: 2019-02-20 | End: 2019-02-22 | Stop reason: HOSPADM

## 2019-02-20 RX ORDER — IBUPROFEN 600 MG/1
600 TABLET ORAL EVERY 4 HOURS PRN
Status: DISCONTINUED | OUTPATIENT
Start: 2019-02-20 | End: 2019-02-22 | Stop reason: HOSPADM

## 2019-02-20 RX ADMIN — FERROUS SULFATE TAB EC 325 MG (65 MG FE EQUIVALENT) 325 MG: 325 (65 FE) TABLET DELAYED RESPONSE at 08:02

## 2019-02-20 RX ADMIN — PHENAZOPYRIDINE HYDROCHLORIDE 100 MG: 100 TABLET ORAL at 09:02

## 2019-02-20 RX ADMIN — IBUPROFEN 800 MG: 400 TABLET, FILM COATED ORAL at 01:02

## 2019-02-20 RX ADMIN — ACETAMINOPHEN 650 MG: 325 TABLET, FILM COATED ORAL at 10:02

## 2019-02-20 RX ADMIN — ONDANSETRON 4 MG: 2 INJECTION INTRAMUSCULAR; INTRAVENOUS at 05:02

## 2019-02-20 RX ADMIN — DICYCLOMINE HYDROCHLORIDE 10 MG: 10 CAPSULE ORAL at 08:02

## 2019-02-20 RX ADMIN — DICYCLOMINE HYDROCHLORIDE 10 MG: 10 CAPSULE ORAL at 12:02

## 2019-02-20 RX ADMIN — PIPERACILLIN AND TAZOBACTAM 4.5 G: 4; .5 INJECTION, POWDER, LYOPHILIZED, FOR SOLUTION INTRAVENOUS; PARENTERAL at 12:02

## 2019-02-20 RX ADMIN — FERROUS SULFATE TAB EC 325 MG (65 MG FE EQUIVALENT) 325 MG: 325 (65 FE) TABLET DELAYED RESPONSE at 09:02

## 2019-02-20 RX ADMIN — PIPERACILLIN AND TAZOBACTAM 4.5 G: 4; .5 INJECTION, POWDER, LYOPHILIZED, FOR SOLUTION INTRAVENOUS; PARENTERAL at 04:02

## 2019-02-20 RX ADMIN — PANTOPRAZOLE SODIUM 40 MG: 40 TABLET, DELAYED RELEASE ORAL at 12:02

## 2019-02-20 RX ADMIN — PHENAZOPYRIDINE HYDROCHLORIDE 100 MG: 100 TABLET ORAL at 01:02

## 2019-02-20 RX ADMIN — DICYCLOMINE HYDROCHLORIDE 10 MG: 10 CAPSULE ORAL at 09:02

## 2019-02-20 RX ADMIN — PIPERACILLIN AND TAZOBACTAM 4.5 G: 4; .5 INJECTION, POWDER, LYOPHILIZED, FOR SOLUTION INTRAVENOUS; PARENTERAL at 08:02

## 2019-02-20 RX ADMIN — DICYCLOMINE HYDROCHLORIDE 10 MG: 10 CAPSULE ORAL at 05:02

## 2019-02-20 RX ADMIN — ACETAMINOPHEN 650 MG: 325 TABLET, FILM COATED ORAL at 06:02

## 2019-02-20 RX ADMIN — IBUPROFEN 800 MG: 400 TABLET, FILM COATED ORAL at 09:02

## 2019-02-20 NOTE — CONSULTS
[facility]  Infectious Disease  Consult Note    Patient Name: Frankie Garcia  MRN: [id number]  Admission Date: 2/18/2019  Hospital Length of Stay: 1 days  Attending Physician: Jonathan Chiu MD  Primary Care Provider: Michelle Hadley MD     Isolation Status: No active isolations    Patient information was obtained from patient and past medical records.      Consults  Assessment/Plan:     * Pyelonephritis    23 year old female admitted with fevers, dysuria, flank pain, abdominal pain and nausea/vomiting despite outpatient Bactrim (see full HPI for detailed history). She was seen at [facility] on Sunday where she was prescribed Cephalexin (only took two doses) for a UTI. I Spoke with [facility] today and her urine culture and G/C are still pending.    She has been febrile up to 103, otherwise VSS. Her admission blood and urine cultures are negative. Repeat UA now does show pyuria and repeat urine culture is pending. CT abdomen showed inflammatory changes within the lower abdomen (R>L) and free fluid in her pelvis. No focal fluid collections suggesting abscess formation. She has some small bowel inflammation but large bowel appears unremarkable. She was given a dose of Ceftriaxone and is now and Zosyn and reports overall symptomatic improvement. Diarrhea occurred after starting antibiotics. C. Diff negative. Stool studies and C/G eval are pending.      Plan   Suspect her symptoms are secondary to a UTI with possible pyelonephritis given the flank pain. Will continue IV antibiotics and follow up all outstanding studies. HIV and RPR also ordered given unprotected sex exposure. Awaiting culture information from [facility]. Bactrim is an adequate antibiotic to treat a UTI/pyelonpehritis, though as her symptoms failed to improve there are concerns for resistance. Will transition back to Ceftriaxone for now.          Thank you for the consult. Please call for any questions.  Jade Gilmore  "CAM  Pager: [phone]    Subjective:     Principal Problem: Pyelonephritis    HPI: Frankie Garcia is a 23 year old female with history of tobacco use who presents with a one week history of dysuria, burning with urination, flank pain and lower abdominal pain. She initially self-treated with Bactrim and AZO at home for a presumed UTI (family member had an old script). Her symptoms did not improve. On Sunday she started having fevers along with nausea and vomiting. She was seen at [facility] urgent care and started on Cephalexin after being diagnosed with a UTI. I have called [facility] and her urine culture and G/C are still pending.    She reports recent history of unprotected sex. She is unaware if her partner has any STDs. She is not on birth control. Denies recurrent UTIs. Two months ago she was diagnosed with Influenza and pneumonia for which she was treated for. No current cough or SOB. No diarrhea, bloody stools, vaginal discharge or bleeding at home. Denies sores, rash, ulcerations. She has traveled to the [facility] > 5 years ago. No other recent travel or exposure to sick contacts. Otherwise, no significant infectious history.     In the ED her urinalysis showed positive nitrites, 3 WBC and many bacteria. Urine cx and Blood culture from admit are negative. Routine labs show mild hyponatremia, mild metabolic acidosis, and mild anemia. No leukocytosis. UPT negative.  CT abdomen pelvis showed "inflammatory changes within the lower abdomen, more pronounced within the right lower quadrant and right adnexal region of uncertain etiology with small amount of right adnexal and pelvic free fluid seen.  No evidence of organized fluid collection or rim enhancing abscess.  Few prominent lymph nodes are seen in the region which may reflect mesenteric adenitis". Transvaginal US showed free fluid in pelvis, slightly greater than expected. She was started on  IV fluids and given a dose of IV Ceftriaxone. Now on Zosyn. ID consulted " for antibiotic recommendations.    Since her admission she has remained febrile up to 103. Otherwise, VSS. Repeat UA now with 30 WBC and trace leukocytosis. Repeat Urine cx pending. CT Increased inflammatory changes seen in the lower quadrants right greater than left.  Increase in small right arm adnexal and free pelvic fluid.  Distal ileal loops are seen coursing through the area of inflammatory change. C. Diff negative. Stool WBC with occasional neutrophils. Chlamydia and Gonorrhea screen pending. Stool cx pending. Gynecology has been consulted.    She now has diarrhea, possibly secondary to antibiotic exposure. Urinary symptoms improving. Vomiting resolved and appetite and abdominal pain are improving. Has persistent fevers and sweats.        Past Medical History:   Diagnosis Date    Cigarette smoker     1ppd    Influenza 01/2019    Pyelonephritis 02/18/2019       Past Surgical History:   Procedure Laterality Date    NO PAST SURGERIES  02/18/2019       Review of patient's allergies indicates:  No Known Allergies    Medications:  No medications prior to admission.     Antibiotics (From admission, onward)    Start     Stop Route Frequency Ordered    02/18/19 2045  piperacillin-tazobactam 4.5 g in sodium chloride 0.9% 100 mL IVPB (ready to mix system)      -- IV Every 8 hours (non-standard times) 02/18/19 2041        Antifungals (From admission, onward)    None        Antivirals (From admission, onward)    None           Immunization History   Administered Date(s) Administered    DTaP 1997, 1997, 1997, 01/14/1999, 05/26/2001    HIB 1997, 1997, 1997, 07/15/1998    HPV 9-Valent 07/06/2017    Hepatitis B, Pediatric/Adolescent 1997, 1997, 1997    IPV 05/26/2001    MMR 07/05/1998, 07/15/1998, 05/26/2001, 11/12/2012    Meningococcal Conjugate (MCV4P) 02/08/2011, 06/01/2015    OPV 1997, 1997, 1997, 01/14/1999    Tdap 02/08/2011     Varicella 06/05/1998, 06/24/1998, 09/04/2007       Family History     Problem Relation (Age of Onset)    Arthritis Father    Diabetes Maternal Aunt        Social History     Socioeconomic History    Marital status: Single     Spouse name: None    Number of children: None    Years of education: None    Highest education level: None   Social Needs    Financial resource strain: None    Food insecurity - worry: None    Food insecurity - inability: None    Transportation needs - medical: None    Transportation needs - non-medical: None   Occupational History    None   Tobacco Use    Smoking status: Current Every Day Smoker     Packs/day: 0.07     Years: 1.00     Pack years: 0.07    Smokeless tobacco: Never Used   Substance and Sexual Activity    Alcohol use: Yes     Alcohol/week: 0.0 oz     Comment: socially    Drug use: No    Sexual activity: Yes     Partners: Female     Birth control/protection: None   Other Topics Concern    None   Social History Narrative    None     Review of Systems   Constitutional: Positive for appetite change (decreased), chills, diaphoresis and fever. Negative for fatigue.   HENT: Negative for congestion, ear pain, hearing loss, sore throat and tinnitus.    Eyes: Negative for pain, redness and visual disturbance.   Respiratory: Negative for cough, chest tightness and shortness of breath.    Cardiovascular: Negative for chest pain and leg swelling.   Gastrointestinal: Positive for abdominal pain (lower), diarrhea (started after admission) and nausea. Negative for constipation and vomiting (resolved).   Genitourinary: Positive for difficulty urinating and dysuria. Negative for flank pain, frequency, hematuria, urgency, vaginal bleeding and vaginal discharge.   Musculoskeletal: Positive for back pain. Negative for arthralgias, myalgias and neck pain.   Skin: Negative for rash and wound.   Allergic/Immunologic: Negative for environmental allergies, food allergies and  immunocompromised state.   Neurological: Negative for dizziness, weakness, light-headedness and headaches.   Hematological: Negative for adenopathy. Does not bruise/bleed easily.   Psychiatric/Behavioral: Negative for agitation and confusion. The patient is not nervous/anxious.      Objective:     Vital Signs (Most Recent):  Temp: (!) 100.8 °F (38.2 °C) (02/20/19 1014)  Pulse: 105 (02/20/19 0813)  Resp: 19 (02/20/19 0813)  BP: (!) 106/57 (02/20/19 0813)  SpO2: 99 % (02/20/19 0813) Vital Signs (24h Range):  Temp:  [98 °F (36.7 °C)-103 °F (39.4 °C)] 100.8 °F (38.2 °C)  Pulse:  [] 105  Resp:  [17-20] 19  SpO2:  [95 %-100 %] 99 %  BP: (102-112)/(56-73) 106/57     Weight: 133.4 kg (294 lb 1.5 oz)  Body mass index is 48.94 kg/m².    Estimated Creatinine Clearance: 153.8 mL/min (based on SCr of 0.8 mg/dL).    Physical Exam   Constitutional: She is oriented to person, place, and time. She appears well-developed and well-nourished. No distress.   HENT:   Head: Normocephalic and atraumatic.   Mouth/Throat: Normal dentition. No dental abscesses, lacerations or dental caries. No oropharyngeal exudate.   No oral lesions   Eyes: Conjunctivae are normal. No scleral icterus.   Cardiovascular: Normal rate, regular rhythm and intact distal pulses.   No murmur heard.  Pulmonary/Chest: Effort normal and breath sounds normal. No respiratory distress. She has no wheezes. She has no rales.   Abdominal: Soft. Bowel sounds are normal. She exhibits no distension. There is no tenderness. There is no rebound and no guarding.   No abdominal or pelvic tenderness   Genitourinary: Vagina normal.   Genitourinary Comments: No CVA tenderness   Musculoskeletal: She exhibits no edema.   Neurological: She is alert and oriented to person, place, and time. No cranial nerve deficit.   Skin: Skin is warm and dry. No rash noted. No erythema.   Psychiatric: She has a normal mood and affect. Her behavior is normal. Judgment and thought content normal.        Significant Labs: All pertinent labs within the past 24 hours have been reviewed.    Significant Imaging: I have reviewed all pertinent imaging results/findings within the past 24 hours.

## 2019-02-20 NOTE — ASSESSMENT & PLAN NOTE
Persistent fever and continue lower abdominal pain R>L. Blood and urine culture remains ngsf. Continue zosyn, IVF and tylenol/ibuprofen for fever. Concern for early appendicitis? Will repeat CT abdomen pelvis. Obtain lactic acid.

## 2019-02-20 NOTE — ASSESSMENT & PLAN NOTE
23 year old female admitted with fevers, dysuria, flank pain, abdominal pain and nausea/vomiting despite outpatient Bactrim (see full HPI for detailed history). She was seen at [facility] on Sunday where she was prescribed Cephalexin (only took two doses) for a UTI. I Spoke with [facility] today and her urine culture and G/C are still pending.    She has been febrile up to 103, otherwise VSS. Her admission blood and urine cultures are negative. Repeat UA now does show pyuria and repeat urine culture is pending. CT abdomen showed inflammatory changes within the lower abdomen (R>L) and free fluid in her pelvis. No focal fluid collections suggesting abscess formation. She has some small bowel inflammation but large bowel appears unremarkable. She was given a dose of Ceftriaxone and is now and Zosyn and reports overall symptomatic improvement. Diarrhea occurred after starting antibiotics. C. Diff negative. Stool studies and C/G eval are pending.      Plan   Suspect her symptoms are secondary to a UTI with possible pyelonephritis given the flank pain. Will continue IV antibiotics and follow up all outstanding studies. HIV and RPR also ordered given unprotected sex exposure. Awaiting culture information from [facility]. Bactrim is an adequate antibiotic to treat a UTI/pyelonpehritis, though as her symptoms failed to improve there are concerns for resistance. Will transition back to Ceftriaxone for now.

## 2019-02-20 NOTE — PLAN OF CARE
Problem: Adult Inpatient Plan of Care  Goal: Plan of Care Review   02/20/19 0328   Plan of Care Review   Plan of Care Reviewed With patient;mother   Progress improving   Outcome Summary Feels much better but still spiking high temperatures     Goal: Optimal Comfort and Wellbeing    Intervention: Provide Person-Centered Care   02/20/19 0328   Support Dyspnea Relief   Trust Relationship/Rapport care explained;emotional support provided;questions answered;questions encouraged;reassurance provided         Problem: Pain Acute  Goal: Optimal Pain Control    Intervention: Prevent or Manage Pain   02/20/19 0328   Prevent or Manage Pain   Sensory Stimulation Regulation care clustered;quiet environment promoted   Sleep/Rest Enhancement awakenings minimized;family presence promoted;noise level reduced;regular sleep/rest pattern promoted         Problem: UTI (Urinary Tract Infection)  Goal: Improved Infection Symptoms    Intervention: Prevent and Manage Infection Progression   02/20/19 0328   Prevent or Manage Infection   Fever Reduction/Comfort Measures cool cloth applied;fluid intake increased;medication administered;lightweight bedding   Infection Management aseptic technique maintained;cultures obtained and sent to lab     Intervention: Facilitate Optimal Urinary Elimination   02/20/19 0328   Promote Bladder Elimination   Urinary Elimination Promotion frequent voiding encouraged;toileting offered         Problem: Fall Injury Risk  Goal: Absence of Fall and Fall-Related Injury    Intervention: Promote Injury-Free Environment   02/20/19 0328   Optimize [place] and Functional Mobility   Environmental Safety Modification clutter free environment maintained;room near unit station   Optimize Balance and Safe Activity   Safety Promotion/Fall Prevention assistive device/personal item within reach;bed alarm set;Fall Risk reviewed with patient/family;lighting adjusted;nonskid shoes/socks when out of bed;room near unit  station;side rails raised x 2;toileting scheduled;supervised activity;instructed to call staff for mobility         Comments: Patient feeling better and free of pain. Still spiking high temperatures but responding to treatments. Temp now 100.3

## 2019-02-20 NOTE — TREATMENT PLAN
CT AP show no acute appendicitis but does show increase inflammatory changes in lower quadrants right greater then left and increase in small right arm adnexa and free pelvis fluid and tiny prominent right mesenteric lymph nodes.   Last temp 101. 7 improved after Ibuprofen/tylenol and currently temp 98.7.   Patient is in monogamous relationship and is sexually active.   Obtain Urine trich, chlamydia and gonorrhea. Possible PID? Consult GYN. Will hold of switching abx until result and GYN recommendations.     Siri Rosenberg, NP

## 2019-02-20 NOTE — ASSESSMENT & PLAN NOTE
Fever, flank pain, evidence of infection on urinalysis.  She mets criteria for sepsis with fever, tachycardia, tachypnea, and urinary source.  Lactic acid normal.   Started empirically on Zosyn.  Persistent fevers.  CT and US of abdomen showing small amount of pelvic free fluid.  Gyn and ID consulted.  All cultures negative so far.  Continue ABx's and follow ID recommendations.

## 2019-02-20 NOTE — SUBJECTIVE & OBJECTIVE
Past Medical History:   Diagnosis Date    Cigarette smoker     1ppd    Influenza 01/2019    Pyelonephritis 02/18/2019       Past Surgical History:   Procedure Laterality Date    NO PAST SURGERIES  02/18/2019       Review of patient's allergies indicates:  No Known Allergies    Medications:  No medications prior to admission.     Antibiotics (From admission, onward)    Start     Stop Route Frequency Ordered    02/18/19 2045  piperacillin-tazobactam 4.5 g in sodium chloride 0.9% 100 mL IVPB (ready to mix system)      -- IV Every 8 hours (non-standard times) 02/18/19 2041        Antifungals (From admission, onward)    None        Antivirals (From admission, onward)    None           Immunization History   Administered Date(s) Administered    DTaP 1997, 1997, 1997, 01/14/1999, 05/26/2001    HIB 1997, 1997, 1997, 07/15/1998    HPV 9-Valent 07/06/2017    Hepatitis B, Pediatric/Adolescent 1997, 1997, 1997    IPV 05/26/2001    MMR 07/05/1998, 07/15/1998, 05/26/2001, 11/12/2012    Meningococcal Conjugate (MCV4P) 02/08/2011, 06/01/2015    OPV 1997, 1997, 1997, 01/14/1999    Tdap 02/08/2011    Varicella 06/05/1998, 06/24/1998, 09/04/2007       Family History     Problem Relation (Age of Onset)    Arthritis Father    Diabetes Maternal Aunt        Social History     Socioeconomic History    Marital status: Single     Spouse name: None    Number of children: None    Years of education: None    Highest education level: None   Social Needs    Financial resource strain: None    Food insecurity - worry: None    Food insecurity - inability: None    Transportation needs - medical: None    Transportation needs - non-medical: None   Occupational History    None   Tobacco Use    Smoking status: Current Every Day Smoker     Packs/day: 0.07     Years: 1.00     Pack years: 0.07    Smokeless tobacco: Never Used   Substance and Sexual Activity     Alcohol use: Yes     Alcohol/week: 0.0 oz     Comment: socially    Drug use: No    Sexual activity: Yes     Partners: Female     Birth control/protection: None   Other Topics Concern    None   Social History Narrative    None     Review of Systems   Constitutional: Positive for appetite change (decreased), chills, diaphoresis and fever. Negative for fatigue.   HENT: Negative for congestion, ear pain, hearing loss, sore throat and tinnitus.    Eyes: Negative for pain, redness and visual disturbance.   Respiratory: Negative for cough, chest tightness and shortness of breath.    Cardiovascular: Negative for chest pain and leg swelling.   Gastrointestinal: Positive for abdominal pain (lower), diarrhea (started after admission) and nausea. Negative for constipation and vomiting (resolved).   Genitourinary: Positive for difficulty urinating and dysuria. Negative for flank pain, frequency, hematuria, urgency, vaginal bleeding and vaginal discharge.   Musculoskeletal: Positive for back pain. Negative for arthralgias, myalgias and neck pain.   Skin: Negative for rash and wound.   Allergic/Immunologic: Negative for environmental allergies, food allergies and immunocompromised state.   Neurological: Negative for dizziness, weakness, light-headedness and headaches.   Hematological: Negative for adenopathy. Does not bruise/bleed easily.   Psychiatric/Behavioral: Negative for agitation and confusion. The patient is not nervous/anxious.      Objective:     Vital Signs (Most Recent):  Temp: (!) 100.8 °F (38.2 °C) (02/20/19 1014)  Pulse: 105 (02/20/19 0813)  Resp: 19 (02/20/19 0813)  BP: (!) 106/57 (02/20/19 0813)  SpO2: 99 % (02/20/19 0813) Vital Signs (24h Range):  Temp:  [98 °F (36.7 °C)-103 °F (39.4 °C)] 100.8 °F (38.2 °C)  Pulse:  [] 105  Resp:  [17-20] 19  SpO2:  [95 %-100 %] 99 %  BP: (102-112)/(56-73) 106/57     Weight: 133.4 kg (294 lb 1.5 oz)  Body mass index is 48.94 kg/m².    Estimated Creatinine Clearance:  153.8 mL/min (based on SCr of 0.8 mg/dL).    Physical Exam   Constitutional: She is oriented to person, place, and time. She appears well-developed and well-nourished. No distress.   HENT:   Head: Normocephalic and atraumatic.   Mouth/Throat: Normal dentition. No dental abscesses, lacerations or dental caries. No oropharyngeal exudate.   No oral lesions   Eyes: Conjunctivae are normal. No scleral icterus.   Cardiovascular: Normal rate, regular rhythm and intact distal pulses.   No murmur heard.  Pulmonary/Chest: Effort normal and breath sounds normal. No respiratory distress. She has no wheezes. She has no rales.   Abdominal: Soft. Bowel sounds are normal. She exhibits no distension. There is no tenderness. There is no rebound and no guarding.   No abdominal or pelvic tenderness   Genitourinary: Vagina normal.   Genitourinary Comments: No CVA tenderness   Musculoskeletal: She exhibits no edema.   Neurological: She is alert and oriented to person, place, and time. No cranial nerve deficit.   Skin: Skin is warm and dry. No rash noted. No erythema.   Psychiatric: She has a normal mood and affect. Her behavior is normal. Judgment and thought content normal.       Significant Labs: All pertinent labs within the past 24 hours have been reviewed.    Significant Imaging: I have reviewed all pertinent imaging results/findings within the past 24 hours.

## 2019-02-20 NOTE — SUBJECTIVE & OBJECTIVE
Interval History: Persistent fevers, but feeling better.    Review of Systems   HENT: Negative for ear discharge and ear pain.    Eyes: Negative for pain and itching.   Endocrine: Negative for polyphagia and polyuria.   Neurological: Negative for seizures and syncope.     Objective:     Vital Signs (Most Recent):  Temp: 99.5 °F (37.5 °C) (02/20/19 1613)  Pulse: 90 (02/20/19 1613)  Resp: 19 (02/20/19 1613)  BP: (!) 103/55 (02/20/19 1613)  SpO2: 96 % (02/20/19 1613) Vital Signs (24h Range):  Temp:  [98 °F (36.7 °C)-103 °F (39.4 °C)] 99.5 °F (37.5 °C)  Pulse:  [] 90  Resp:  [17-19] 19  SpO2:  [95 %-99 %] 96 %  BP: (102-116)/(53-61) 103/55     Weight: 133.4 kg (294 lb 1.5 oz)  Body mass index is 48.94 kg/m².    Intake/Output Summary (Last 24 hours) at 2/20/2019 1633  Last data filed at 2/20/2019 1400  Gross per 24 hour   Intake 1881.25 ml   Output 2400 ml   Net -518.75 ml      Physical Exam   Constitutional: She is oriented to person, place, and time. She appears well-developed and well-nourished. No distress.   HENT:   Head: Normocephalic and atraumatic.   Mouth/Throat: Normal dentition. No dental abscesses, lacerations or dental caries. No oropharyngeal exudate.   Eyes: Conjunctivae are normal. No scleral icterus.   Cardiovascular: Normal rate, regular rhythm and intact distal pulses.   No murmur heard.  Pulmonary/Chest: Effort normal and breath sounds normal. No respiratory distress. She has no wheezes. She has no rales.   Abdominal: Soft. Bowel sounds are normal. She exhibits no distension. There is no tenderness. There is no rebound and no guarding.   No abdominal or pelvic tenderness   Genitourinary: Vagina normal.   Genitourinary Comments: No CVA tenderness   Musculoskeletal: She exhibits no edema.   Neurological: She is alert and oriented to person, place, and time. No cranial nerve deficit.   Skin: Skin is warm and dry. No rash noted. No erythema.   Psychiatric: She has a normal mood and affect. Her  behavior is normal. Judgment and thought content normal.       Significant Labs:   BMP:   Recent Labs   Lab 02/20/19  0528         K 3.5      CO2 23   BUN 4*   CREATININE 0.8   CALCIUM 8.8     CBC:   Recent Labs   Lab 02/19/19  0450 02/20/19 0528   WBC 6.32 4.92   HGB 9.8* 9.3*   HCT 31.5* 29.5*    156       Significant Imaging: I have reviewed all pertinent imaging results/findings within the past 24 hours.

## 2019-02-20 NOTE — PLAN OF CARE
Problem: Adult Inpatient Plan of Care  Goal: Plan of Care Review  Outcome: Ongoing (interventions implemented as appropriate)   02/20/19 1505   Plan of Care Review   Plan of Care Reviewed With patient;father;mother       Problem: Pain Acute  Goal: Optimal Pain Control  Outcome: Ongoing (interventions implemented as appropriate)  Intervention: Develop Pain Management Plan   02/20/19 1505   Prevent or Manage Pain   Pain Management Interventions pain management plan reviewed with patient/caregiver;quiet environment facilitated         Problem: UTI (Urinary Tract Infection)  Goal: Improved Infection Symptoms  Outcome: Ongoing (interventions implemented as appropriate)  Intervention: Prevent and Manage Infection Progression   02/20/19 1505   Prevent or Manage Infection   Fever Reduction/Comfort Measures medication administered   Infection Management aseptic technique maintained         Problem: Infection  Goal: Infection Symptom Resolution  Outcome: Ongoing (interventions implemented as appropriate)  Intervention: Prevent or Manage Infection   02/20/19 1505   Prevent or Manage Infection   Fever Reduction/Comfort Measures medication administered   Infection Management aseptic technique maintained         Problem: Fall Injury Risk  Goal: Absence of Fall and Fall-Related Injury  Outcome: Ongoing (interventions implemented as appropriate)  Intervention: Identify and Manage Contributors to Fall Injury Risk   02/20/19 1505   Manage Acute Allergic Reaction   Medication Review/Management medications reviewed   Identify and Manage Contributors to Fall Injury Risk   Self-Care Promotion independence encouraged;BADL personal objects within reach;BADL personal routines maintained

## 2019-02-20 NOTE — PLAN OF CARE
Problem: Adult Inpatient Plan of Care  Goal: Absence of Hospital-Acquired Illness or Injury    Intervention: Identify and Manage Fall Risk   02/20/19 0635   Optimize Balance and Safe Activity   Safety Promotion/Fall Prevention assistive device/personal item within reach;Fall Risk reviewed with patient/family;family to remain at bedside;nonskid shoes/socks when out of bed;room near unit station;side rails raised x 2;toileting scheduled     Intervention: Prevent VTE (venous thromboembolism)   02/20/19 0635   Prevent or Manage Embolism   VTE Prevention/Management ambulation promoted;intravenous hydration         Problem: Pain Acute  Goal: Optimal Pain Control    Intervention: Develop Pain Management Plan   02/20/19 0635   Prevent or Manage Pain   Pain Management Interventions pain management plan reviewed with patient/caregiver;quiet environment facilitated;pillow support provided         Problem: UTI (Urinary Tract Infection)  Goal: Improved Infection Symptoms    Intervention: Prevent and Manage Infection Progression   02/20/19 0635   Prevent or Manage Infection   Fever Reduction/Comfort Measures cool cloth applied;ice pack(s);lightweight clothing;medication administered         Comments: Patient resting comfortably. States she feels much better. Urine specimens and stool specimens sent to lab

## 2019-02-20 NOTE — ASSESSMENT & PLAN NOTE
BMI Body mass index is 48.94 kg/m².  Patient counseled on risks obesity imparts on overall health. Urged toward diet and lifestyle modifications to aid in weight reduction.

## 2019-02-20 NOTE — HPI
"Frankie Garcia is a 23 year old female with history of tobacco use who presents with a one week history of dysuria, burning with urination, flank pain and lower abdominal pain. She initially self-treated with Bactrim and AZO at home for a presumed UTI (family member had an old script). Her symptoms did not improve. On Sunday she started having fevers along with nausea and vomiting. She was seen at [facility] urgent care and started on Cephalexin after being diagnosed with a UTI. I have called [facility] and her urine culture and G/C are still pending.    She reports recent history of unprotected sex. She is unaware if her partner has any STDs. She is not on birth control. Denies recurrent UTIs. Two months ago she was diagnosed with Influenza and pneumonia for which she was treated for. No current cough or SOB. No diarrhea, bloody stools, vaginal discharge or bleeding at home. Denies sores, rash, ulcerations. She has traveled to the [facility] > 5 years ago. No other recent travel or exposure to sick contacts. Otherwise, no significant infectious history.     In the ED her urinalysis showed positive nitrites, 3 WBC and many bacteria. Urine cx and Blood culture from admit are negative. Routine labs show mild hyponatremia, mild metabolic acidosis, and mild anemia. No leukocytosis. UPT negative.  CT abdomen pelvis showed "inflammatory changes within the lower abdomen, more pronounced within the right lower quadrant and right adnexal region of uncertain etiology with small amount of right adnexal and pelvic free fluid seen.  No evidence of organized fluid collection or rim enhancing abscess.  Few prominent lymph nodes are seen in the region which may reflect mesenteric adenitis". Transvaginal US showed free fluid in pelvis, slightly greater than expected. She was started on  IV fluids and given a dose of IV Ceftriaxone. Now on Zosyn. ID consulted for antibiotic recommendations.    Since her admission she has remained febrile up " to 103. Otherwise, VSS. Repeat UA now with 30 WBC and trace leukocytosis. Repeat Urine cx pending. CT Increased inflammatory changes seen in the lower quadrants right greater than left.  Increase in small right arm adnexal and free pelvic fluid.  Distal ileal loops are seen coursing through the area of inflammatory change. C. Diff negative. Stool WBC with occasional neutrophils. Chlamydia and Gonorrhea screen pending. Stool cx pending. Gynecology has been consulted.    She now has diarrhea, possibly secondary to antibiotic exposure. Urinary symptoms improving. Vomiting resolved and appetite and abdominal pain are improving. Has persistent fevers and sweats.

## 2019-02-21 LAB
ANION GAP SERPL CALC-SCNC: 8 MMOL/L
BASOPHILS # BLD AUTO: 0.01 K/UL
BASOPHILS NFR BLD: 0.2 %
BUN SERPL-MCNC: 6 MG/DL
C TRACH DNA SPEC QL NAA+PROBE: NOT DETECTED
CALCIUM SERPL-MCNC: 8.6 MG/DL
CHLORIDE SERPL-SCNC: 107 MMOL/L
CO2 SERPL-SCNC: 23 MMOL/L
CREAT SERPL-MCNC: 0.8 MG/DL
CRP SERPL-MCNC: 323.3 MG/L
DIFFERENTIAL METHOD: ABNORMAL
EOSINOPHIL # BLD AUTO: 0.1 K/UL
EOSINOPHIL NFR BLD: 3 %
ERYTHROCYTE [DISTWIDTH] IN BLOOD BY AUTOMATED COUNT: 13.7 %
ERYTHROCYTE [SEDIMENTATION RATE] IN BLOOD BY WESTERGREN METHOD: 109 MM/HR
EST. GFR  (AFRICAN AMERICAN): >60 ML/MIN/1.73 M^2
EST. GFR  (NON AFRICAN AMERICAN): >60 ML/MIN/1.73 M^2
GLUCOSE SERPL-MCNC: 92 MG/DL
HCT VFR BLD AUTO: 26.9 %
HGB BLD-MCNC: 8.8 G/DL
HIV 1+2 AB+HIV1 P24 AG SERPL QL IA: NEGATIVE
LYMPHOCYTES # BLD AUTO: 1.1 K/UL
LYMPHOCYTES NFR BLD: 25.8 %
MCH RBC QN AUTO: 28.5 PG
MCHC RBC AUTO-ENTMCNC: 32.7 G/DL
MCV RBC AUTO: 87 FL
MONOCYTES # BLD AUTO: 0.5 K/UL
MONOCYTES NFR BLD: 11.5 %
N GONORRHOEA DNA SPEC QL NAA+PROBE: NOT DETECTED
NEUTROPHILS # BLD AUTO: 2.5 K/UL
NEUTROPHILS NFR BLD: 59.5 %
O+P STL TRI STN: NORMAL
PLATELET # BLD AUTO: 142 K/UL
PMV BLD AUTO: 9.3 FL
POTASSIUM SERPL-SCNC: 3.2 MMOL/L
RBC # BLD AUTO: 3.09 M/UL
RPR SER QL: NORMAL
SODIUM SERPL-SCNC: 138 MMOL/L
WBC # BLD AUTO: 4.27 K/UL

## 2019-02-21 PROCEDURE — 25000003 PHARM REV CODE 250: Performed by: HOSPITALIST

## 2019-02-21 PROCEDURE — 99233 PR SUBSEQUENT HOSPITAL CARE,LEVL III: ICD-10-PCS | Mod: ,,, | Performed by: PHYSICIAN ASSISTANT

## 2019-02-21 PROCEDURE — 85025 COMPLETE CBC W/AUTO DIFF WBC: CPT

## 2019-02-21 PROCEDURE — 80048 BASIC METABOLIC PNL TOTAL CA: CPT

## 2019-02-21 PROCEDURE — 36415 COLL VENOUS BLD VENIPUNCTURE: CPT

## 2019-02-21 PROCEDURE — 63600175 PHARM REV CODE 636 W HCPCS: Performed by: HOSPITALIST

## 2019-02-21 PROCEDURE — 21400001 HC TELEMETRY ROOM

## 2019-02-21 PROCEDURE — 86140 C-REACTIVE PROTEIN: CPT

## 2019-02-21 PROCEDURE — 99252 IP/OBS CONSLTJ NEW/EST SF 35: CPT | Mod: ,,, | Performed by: OBSTETRICS & GYNECOLOGY

## 2019-02-21 PROCEDURE — 63600175 PHARM REV CODE 636 W HCPCS

## 2019-02-21 PROCEDURE — 85652 RBC SED RATE AUTOMATED: CPT

## 2019-02-21 PROCEDURE — 25000003 PHARM REV CODE 250: Performed by: NURSE PRACTITIONER

## 2019-02-21 PROCEDURE — 84145 PROCALCITONIN (PCT): CPT

## 2019-02-21 PROCEDURE — 99233 SBSQ HOSP IP/OBS HIGH 50: CPT | Mod: ,,, | Performed by: PHYSICIAN ASSISTANT

## 2019-02-21 PROCEDURE — 99252 PR INITIAL INPATIENT CONSULT,LEVL II: ICD-10-PCS | Mod: ,,, | Performed by: OBSTETRICS & GYNECOLOGY

## 2019-02-21 RX ORDER — PHENAZOPYRIDINE HYDROCHLORIDE 100 MG/1
100 TABLET, FILM COATED ORAL
Status: DISCONTINUED | OUTPATIENT
Start: 2019-02-21 | End: 2019-02-22 | Stop reason: HOSPADM

## 2019-02-21 RX ADMIN — FERROUS SULFATE TAB EC 325 MG (65 MG FE EQUIVALENT) 325 MG: 325 (65 FE) TABLET DELAYED RESPONSE at 09:02

## 2019-02-21 RX ADMIN — ONDANSETRON 4 MG: 2 INJECTION INTRAMUSCULAR; INTRAVENOUS at 09:02

## 2019-02-21 RX ADMIN — LOPERAMIDE HYDROCHLORIDE 2 MG: 2 CAPSULE ORAL at 09:02

## 2019-02-21 RX ADMIN — CEFTRIAXONE 1 G: 1 INJECTION, SOLUTION INTRAVENOUS at 08:02

## 2019-02-21 RX ADMIN — IBUPROFEN 600 MG: 600 TABLET ORAL at 08:02

## 2019-02-21 RX ADMIN — DICYCLOMINE HYDROCHLORIDE 10 MG: 10 CAPSULE ORAL at 05:02

## 2019-02-21 RX ADMIN — IBUPROFEN 600 MG: 600 TABLET ORAL at 01:02

## 2019-02-21 RX ADMIN — PANTOPRAZOLE SODIUM 40 MG: 40 TABLET, DELAYED RELEASE ORAL at 09:02

## 2019-02-21 RX ADMIN — DICYCLOMINE HYDROCHLORIDE 10 MG: 10 CAPSULE ORAL at 09:02

## 2019-02-21 RX ADMIN — PIPERACILLIN AND TAZOBACTAM 4.5 G: 4; .5 INJECTION, POWDER, LYOPHILIZED, FOR SOLUTION INTRAVENOUS; PARENTERAL at 05:02

## 2019-02-21 RX ADMIN — CEFTRIAXONE 1 G: 1 INJECTION, SOLUTION INTRAVENOUS at 09:02

## 2019-02-21 RX ADMIN — DICYCLOMINE HYDROCHLORIDE 10 MG: 10 CAPSULE ORAL at 08:02

## 2019-02-21 RX ADMIN — DICYCLOMINE HYDROCHLORIDE 10 MG: 10 CAPSULE ORAL at 01:02

## 2019-02-21 RX ADMIN — ACETAMINOPHEN 650 MG: 325 TABLET, FILM COATED ORAL at 12:02

## 2019-02-21 RX ADMIN — FERROUS SULFATE TAB EC 325 MG (65 MG FE EQUIVALENT) 325 MG: 325 (65 FE) TABLET DELAYED RESPONSE at 08:02

## 2019-02-21 RX ADMIN — PHENAZOPYRIDINE HYDROCHLORIDE 100 MG: 100 TABLET ORAL at 01:02

## 2019-02-21 NOTE — CONSULTS
Chart reviewed.  Patient interviewed and examined.  22 yo G0  Admitted by Hospital Medicine for fever and a dirty urine.  Treated for pyelonephritis.  CT and pelvic ultrasound with fluid in pelvis  Normal appendix.  Seen by General Surgery, and ID also.  ID changed her antibiotic yesterday, 2/20/2019  Still with fever.  Early this morning up to 102.9  Menses started earlier this morning.    Exam by me this morning, minimal change.  No CVAT.  No pain.  No abdominal tenderness.    Blood and Urine cultures still no growth.    Doubt if PID  Continue with current antibiotic regimen  Will continue to follow with you.    Thank you for allowing me to participate in the care of our patient.    Harinder Alatorre MD

## 2019-02-21 NOTE — PLAN OF CARE
Problem: Adult Inpatient Plan of Care  Goal: Plan of Care Review  Outcome: Ongoing (interventions implemented as appropriate)  Plan of care reviewed with patient at bedside. All questions answered. Fall precautions are in place. Call light within reach. Bed in lowest position.  No complaints throughout the night. Patient did spike a temp during the night. SBoth tylenol and ibuprofen given.  PIV intact infusing NS at 100. NSR. Will continue to monitor.

## 2019-02-21 NOTE — ASSESSMENT & PLAN NOTE
23 year old female admitted with fevers, dysuria, flank pain, abdominal pain and nausea/vomiting despite outpatient Bactrim (see full HPI for detailed history). She has history of recent unprotected sex exposure. She was seen at [facility] on Sunday where she was prescribed Cephalexin (only took two doses) for a UTI. I Spoke with [facility] today and her urine culture returned with normal urogenital lisbeth and G/C is negative.    She has been febrile up to 103, otherwise VSS. Her blood and urine cultures are negative. UA does show pyuria. CT abdomen showed inflammatory changes within the lower abdomen (R>L) and free fluid in her pelvis. No focal fluid collections suggesting abscess formation. She has some small bowel inflammation but large bowel appears unremarkable. Right sided ovarian cyst noted on TVUS. She is currently on Ceftriaxone and reports overall symptomatic improvement, though not significantly. Remains febrile. Continues to have diarrhea. C. Diff and stool culture negative. G&C and RPR here pending. HIV negative. No pelvic pain on exam. Mild R CVA tenderness.      Plan   Puzzling case. She remains febrile despite broad spectrum IV antibiotics, though no fevers since around 2:00 a.m. Suspect her symptoms are secondary to a UTI with possible pyelonephritis given the flank pain. Also consider ruptured ovarian cyst versus possible autoimmune process? Procalcitonin, ESR and CRP ordered. Will continue IV antibiotics and monitor fever curve closely.

## 2019-02-21 NOTE — SUBJECTIVE & OBJECTIVE
Interval History: Complaining of increased dysuria.     Review of Systems   HENT: Negative for ear discharge and ear pain.    Eyes: Negative for pain and itching.   Endocrine: Negative for polyphagia and polyuria.   Neurological: Negative for seizures and syncope.     Objective:     Vital Signs (Most Recent):  Temp: 98.2 °F (36.8 °C) (02/21/19 1203)  Pulse: 79 (02/21/19 1203)  Resp: 16 (02/21/19 1203)  BP: 117/67 (02/21/19 1203)  SpO2: 98 % (02/21/19 1203) Vital Signs (24h Range):  Temp:  [98 °F (36.7 °C)-102.9 °F (39.4 °C)] 98.2 °F (36.8 °C)  Pulse:  [] 79  Resp:  [16-18] 16  SpO2:  [94 %-98 %] 98 %  BP: (114-126)/(54-67) 117/67     Weight: 133.4 kg (294 lb 1.5 oz)  Body mass index is 48.94 kg/m².    Intake/Output Summary (Last 24 hours) at 2/21/2019 1623  Last data filed at 2/21/2019 0617  Gross per 24 hour   Intake 4141.66 ml   Output --   Net 4141.66 ml      Physical Exam   Constitutional: She is oriented to person, place, and time. She appears well-developed and well-nourished. No distress.   HENT:   Head: Normocephalic and atraumatic.   Mouth/Throat: Normal dentition. No dental abscesses, lacerations or dental caries. No oropharyngeal exudate.   Eyes: Conjunctivae are normal. No scleral icterus.   Cardiovascular: Normal rate, regular rhythm and intact distal pulses.   No murmur heard.  Pulmonary/Chest: Effort normal and breath sounds normal. No respiratory distress. She has no wheezes. She has no rales.   Abdominal: Soft. Bowel sounds are normal. She exhibits no distension. There is no tenderness. There is no rebound and no guarding.   No abdominal or pelvic tenderness   Genitourinary: Vagina normal.   Genitourinary Comments: No CVA tenderness   Musculoskeletal: She exhibits no edema.   Neurological: She is alert and oriented to person, place, and time. No cranial nerve deficit.   Skin: Skin is warm and dry. No rash noted. No erythema.   Psychiatric: She has a normal mood and affect. Her behavior is  normal. Judgment and thought content normal.       Significant Labs:   BMP:   Recent Labs   Lab 02/21/19  0545   GLU 92      K 3.2*      CO2 23   BUN 6   CREATININE 0.8   CALCIUM 8.6*     CBC:   Recent Labs   Lab 02/20/19  0528 02/21/19  0545   WBC 4.92 4.27   HGB 9.3* 8.8*   HCT 29.5* 26.9*    142*       Significant Imaging: I have reviewed all pertinent imaging results/findings within the past 24 hours.

## 2019-02-21 NOTE — PLAN OF CARE
Problem: Adult Inpatient Plan of Care  Goal: Plan of Care Review  Outcome: Ongoing (interventions implemented as appropriate)   02/21/19 1623   Plan of Care Review   Plan of Care Reviewed With patient;mother;father   Progress improving       Problem: Pain Acute  Goal: Optimal Pain Control  Outcome: Ongoing (interventions implemented as appropriate)  Intervention: Develop Pain Management Plan   02/21/19 1623   Prevent or Manage Pain   Pain Management Interventions pain management plan reviewed with patient/caregiver;relaxation techniques promoted         Problem: UTI (Urinary Tract Infection)  Goal: Improved Infection Symptoms  Outcome: Ongoing (interventions implemented as appropriate)  Intervention: Prevent and Manage Infection Progression   02/21/19 1623   Prevent or Manage Infection   Fever Reduction/Comfort Measures medication administered   Infection Management aseptic technique maintained

## 2019-02-21 NOTE — SUBJECTIVE & OBJECTIVE
Interval History: Remains febrile overnight. No leukocytosis. Started her menstrual cycle. Otherwise, no new complaints. Symptoms minimally improved.    Review of Systems   Constitutional: Positive for appetite change (decreased), chills, diaphoresis and fever. Negative for fatigue.   HENT: Negative for congestion, ear pain, hearing loss, sore throat and tinnitus.    Eyes: Negative for pain, redness and visual disturbance.   Respiratory: Negative for cough, chest tightness and shortness of breath.    Cardiovascular: Negative for chest pain and leg swelling.   Gastrointestinal: Positive for abdominal pain (lower), diarrhea (started after admission) and nausea. Negative for constipation and vomiting (resolved).   Genitourinary: Positive for difficulty urinating, dysuria and vaginal bleeding. Negative for flank pain, frequency, hematuria, urgency and vaginal discharge.   Musculoskeletal: Positive for back pain. Negative for arthralgias, myalgias and neck pain.   Skin: Negative for rash and wound.   Allergic/Immunologic: Negative for environmental allergies, food allergies and immunocompromised state.   Neurological: Positive for weakness. Negative for dizziness, light-headedness and headaches.   Psychiatric/Behavioral: Negative for agitation and confusion. The patient is not nervous/anxious.      Objective:     Vital Signs (Most Recent):  Temp: 98.2 °F (36.8 °C) (02/21/19 1203)  Pulse: 79 (02/21/19 1203)  Resp: 16 (02/21/19 1203)  BP: 117/67 (02/21/19 1203)  SpO2: 98 % (02/21/19 1203) Vital Signs (24h Range):  Temp:  [98 °F (36.7 °C)-102.9 °F (39.4 °C)] 98.2 °F (36.8 °C)  Pulse:  [] 79  Resp:  [16-19] 16  SpO2:  [94 %-98 %] 98 %  BP: (103-126)/(54-67) 117/67     Weight: 133.4 kg (294 lb 1.5 oz)  Body mass index is 48.94 kg/m².    Estimated Creatinine Clearance: 153.8 mL/min (based on SCr of 0.8 mg/dL).    Physical Exam   Constitutional: She is oriented to person, place, and time. She appears well-developed and  well-nourished. No distress.   HENT:   Head: Normocephalic and atraumatic.   Mouth/Throat: Normal dentition. No dental abscesses, lacerations or dental caries. No oropharyngeal exudate.   No oral lesions   Eyes: Conjunctivae are normal. No scleral icterus.   Cardiovascular: Normal rate, regular rhythm and intact distal pulses.   No murmur heard.  Pulmonary/Chest: Effort normal and breath sounds normal. No respiratory distress. She has no wheezes. She has no rales.   Abdominal: Soft. Bowel sounds are normal. She exhibits no distension. There is no tenderness. There is no rebound and no guarding.   No abdominal or pelvic tenderness   Genitourinary: Vagina normal.   Genitourinary Comments: Mild right sided CVA tenderness   Musculoskeletal: She exhibits no edema.   Neurological: She is alert and oriented to person, place, and time.   Skin: Skin is warm and dry. No rash noted. No erythema.   Psychiatric: She has a normal mood and affect. Her behavior is normal. Judgment and thought content normal.       Significant Labs: All pertinent labs within the past 24 hours have been reviewed.    Significant Imaging: I have reviewed all pertinent imaging results/findings within the past 24 hours.

## 2019-02-21 NOTE — PROGRESS NOTES
[facility] [place]  Infectious Disease  Progress Note    Patient Name: Frankie Garcia  MRN: [id number]  Admission Date: 2/18/2019  Length of Stay: 2 days  Attending Physician: Jonathan Chiu MD  Primary Care Provider: Michelle Hadley MD    Isolation Status: No active isolations  Assessment/Plan:      * Pyelonephritis    23 year old female admitted with fevers, dysuria, flank pain, abdominal pain and nausea/vomiting despite outpatient Bactrim (see full HPI for detailed history). She has history of recent unprotected sex exposure. She was seen at [facility] on Sunday where she was prescribed Cephalexin (only took two doses) for a UTI. I Spoke with [facility] today and her urine culture returned with normal urogenital lisbeth and G/C is negative.    She has been febrile up to 103, otherwise VSS. Her blood and urine cultures are negative. UA does show pyuria. CT abdomen showed inflammatory changes within the lower abdomen (R>L) and free fluid in her pelvis. No focal fluid collections suggesting abscess formation. She has some small bowel inflammation but large bowel appears unremarkable. Right sided ovarian cyst noted on TVUS. She is currently on Ceftriaxone and reports overall symptomatic improvement, though not significantly. Remains febrile. Continues to have diarrhea. C. Diff and stool culture negative. G&C and RPR here pending. HIV negative. No pelvic pain on exam. Mild R CVA tenderness.      Plan   Puzzling case. She remains febrile despite broad spectrum IV antibiotics, though no fevers since around 2:00 a.m. Suspect her symptoms are secondary to a UTI with possible pyelonephritis given the flank pain. Also consider ruptured ovarian cyst versus possible autoimmune process? Procalcitonin, ESR and CRP ordered. Will continue IV antibiotics and monitor fever curve closely.          Please call for any questions. Thank you.  Jade Gilmore PA-C  Pager: [phone]    Subjective:     Principal  "Problem:Pyelonephritis    HPI: Frankie Garcia is a 23 year old female with history of tobacco use who presents with a one week history of dysuria, burning with urination, flank pain and lower abdominal pain. She initially self-treated with Bactrim and AZO at home for a presumed UTI (family member had an old script). Her symptoms did not improve. On Sunday she started having fevers along with nausea and vomiting. She was seen at [facility] urgent care and started on Cephalexin after being diagnosed with a UTI. I have called [facility] and her urine culture and G/C are still pending.    She reports recent history of unprotected sex. She is unaware if her partner has any STDs. She is not on birth control. Denies recurrent UTIs. Two months ago she was diagnosed with Influenza and pneumonia for which she was treated for. No current cough or SOB. No diarrhea, bloody stools, vaginal discharge or bleeding at home. Denies sores, rash, ulcerations. She has traveled to the [facility] > 5 years ago. No other recent travel or exposure to sick contacts. Otherwise, no significant infectious history.     In the ED her urinalysis showed positive nitrites, 3 WBC and many bacteria. Urine cx and Blood culture from admit are negative. Routine labs show mild hyponatremia, mild metabolic acidosis, and mild anemia. No leukocytosis. UPT negative.  CT abdomen pelvis showed "inflammatory changes within the lower abdomen, more pronounced within the right lower quadrant and right adnexal region of uncertain etiology with small amount of right adnexal and pelvic free fluid seen.  No evidence of organized fluid collection or rim enhancing abscess.  Few prominent lymph nodes are seen in the region which may reflect mesenteric adenitis". Transvaginal US showed free fluid in pelvis, slightly greater than expected. She was started on  IV fluids and given a dose of IV Ceftriaxone. Now on Zosyn. ID consulted for antibiotic recommendations.    Since her " admission she has remained febrile up to 103. Otherwise, VSS. Repeat UA now with 30 WBC and trace leukocytosis. Repeat Urine cx pending. CT Increased inflammatory changes seen in the lower quadrants right greater than left.  Increase in small right arm adnexal and free pelvic fluid.  Distal ileal loops are seen coursing through the area of inflammatory change. C. Diff negative. Stool WBC with occasional neutrophils. Chlamydia and Gonorrhea screen pending. Stool cx pending. Gynecology has been consulted.    She now has diarrhea, possibly secondary to antibiotic exposure. Urinary symptoms improving. Vomiting resolved and appetite and abdominal pain are improving. Has persistent fevers and sweats.      Interval History: Remains febrile overnight. No leukocytosis. Started her menstrual cycle. Otherwise, no new complaints. Symptoms minimally improved.    Review of Systems   Constitutional: Positive for appetite change (decreased), chills, diaphoresis and fever. Negative for fatigue.   HENT: Negative for congestion, ear pain, hearing loss, sore throat and tinnitus.    Eyes: Negative for pain, redness and visual disturbance.   Respiratory: Negative for cough, chest tightness and shortness of breath.    Cardiovascular: Negative for chest pain and leg swelling.   Gastrointestinal: Positive for abdominal pain (lower), diarrhea (started after admission) and nausea. Negative for constipation and vomiting (resolved).   Genitourinary: Positive for difficulty urinating, dysuria and vaginal bleeding. Negative for flank pain, frequency, hematuria, urgency and vaginal discharge.   Musculoskeletal: Positive for back pain. Negative for arthralgias, myalgias and neck pain.   Skin: Negative for rash and wound.   Allergic/Immunologic: Negative for environmental allergies, food allergies and immunocompromised state.   Neurological: Positive for weakness. Negative for dizziness, light-headedness and headaches.   Psychiatric/Behavioral:  Negative for agitation and confusion. The patient is not nervous/anxious.      Objective:     Vital Signs (Most Recent):  Temp: 98.2 °F (36.8 °C) (02/21/19 1203)  Pulse: 79 (02/21/19 1203)  Resp: 16 (02/21/19 1203)  BP: 117/67 (02/21/19 1203)  SpO2: 98 % (02/21/19 1203) Vital Signs (24h Range):  Temp:  [98 °F (36.7 °C)-102.9 °F (39.4 °C)] 98.2 °F (36.8 °C)  Pulse:  [] 79  Resp:  [16-19] 16  SpO2:  [94 %-98 %] 98 %  BP: (103-126)/(54-67) 117/67     Weight: 133.4 kg (294 lb 1.5 oz)  Body mass index is 48.94 kg/m².    Estimated Creatinine Clearance: 153.8 mL/min (based on SCr of 0.8 mg/dL).    Physical Exam   Constitutional: She is oriented to person, place, and time. She appears well-developed and well-nourished. No distress.   HENT:   Head: Normocephalic and atraumatic.   Mouth/Throat: Normal dentition. No dental abscesses, lacerations or dental caries. No oropharyngeal exudate.   No oral lesions   Eyes: Conjunctivae are normal. No scleral icterus.   Cardiovascular: Normal rate, regular rhythm and intact distal pulses.   No murmur heard.  Pulmonary/Chest: Effort normal and breath sounds normal. No respiratory distress. She has no wheezes. She has no rales.   Abdominal: Soft. Bowel sounds are normal. She exhibits no distension. There is no tenderness. There is no rebound and no guarding.   No abdominal or pelvic tenderness   Genitourinary: Vagina normal.   Genitourinary Comments: Mild right sided CVA tenderness   Musculoskeletal: She exhibits no edema.   Neurological: She is alert and oriented to person, place, and time.   Skin: Skin is warm and dry. No rash noted. No erythema.   Psychiatric: She has a normal mood and affect. Her behavior is normal. Judgment and thought content normal.       Significant Labs: All pertinent labs within the past 24 hours have been reviewed.    Significant Imaging: I have reviewed all pertinent imaging results/findings within the past 24 hours.

## 2019-02-21 NOTE — NURSING
Bedside report given to night nurse. Pt has no sign of distress. IV fluids continuous. Safety precautions are maintained with bed alarm set, bed in lowest position, bed wheels locked, and call light in reach. Chart check completed.

## 2019-02-21 NOTE — NURSING
Report given to day shift nurseMichelle LPN. Patient is resting in bed. Fall precautions are in place.

## 2019-02-21 NOTE — ASSESSMENT & PLAN NOTE
Fever, flank pain, evidence of infection on urinalysis.  She mets criteria for sepsis with fever, tachycardia, tachypnea, and urinary source.  Lactic acid normal.   Started empirically on Zosyn.  Persistent fevers.  CT and US of abdomen showing small amount of pelvic free fluid.  Gyn and ID consulted.  All cultures negative so far.  No evidence PID per Gyn.    Continue treatment of pyelo per ID.  Zosyn changed to Rocephin.  May need to consider possible non infectious source of fevers if they persists.

## 2019-02-21 NOTE — PROGRESS NOTES
"[facility] Medicine  Progress Note    Patient Name: Frankie Garcia  MRN: [id number]  Patient Class: IP- Inpatient   Admission Date: 2/18/2019  Length of Stay: 2 days  Attending Physician: Jonathan Chiu MD  Primary Care Provider: Michelle Hadley MD        Subjective:     Principal Problem:Pyelonephritis    HPI:  23 y.o. female with obesity and tobacco use presents with a complaint of body aches, fever (T max 103.6 F), flank pain, lower abdominal pain, nausea, vomiting for the past week.  Reports she was diagnosed with UTI 6 days ago and started on Bactrim with no improvement of symptoms, went to urgent care yesterday and was prescribed Keflex.  She is also taken azo, Tylenol, and ibuprofen.  She denies cough, SOB, palpitations, dizziness, syncope, diarrhea, bloody or black stools.  In the ED her urinalysis showed evidence of infection.  Routine labs show mild hyponatremia, mild metabolic acidosis, and mild anemia.  UPT negative.  CT abdomen pelvis showed inflammatory changes within the lower abdomen, more pronounced within the right lower quadrant and right adnexal region of uncertain etiology with small amount of right adnexal and pelvic free fluid seen.  No evidence of organized fluid collection or rim enhancing abscess.  Few prominent lymph nodes are seen in the region which may reflect mesenteric adenitis.  Pelvic ultrasound pending.  Blood in urine cultures were obtained.  IV fluids and IV antibiotics were initiated.  Placed in observation for further evaluation and treatment.      Hospital Course:  Patient dmitted for pyeloneophritis. CT AP with Contrast "Inflammatory changes seen within the lower abdomen, more pronounced within the right lower quadrant and right adnexal region of uncertain etiology with small amount of right adnexal and pelvic free fluid seen.  No evidence of organized fluid collection or rim enhancing abscess.  Few prominent lymph nodes are seen in the region " "which may reflect mesenteric adenitis." CT also showed "distal ileal loops within the right lower quadrant course through the region of inflammatory change."  US of pelviic "Free fluid seen in the pelvis which is slightly greater than expected normal physiologic volume.Otherwise no significant abnormalities identified."   Patient with persistent fevers.  Gyn and ID consulted.  No evidence of PID per Gyn.  Continue treatment of pyelonephritis per ID.  Zosyn changed to Rocephin.    Interval History: Complaining of increased dysuria.     Review of Systems   HENT: Negative for ear discharge and ear pain.    Eyes: Negative for pain and itching.   Endocrine: Negative for polyphagia and polyuria.   Neurological: Negative for seizures and syncope.     Objective:     Vital Signs (Most Recent):  Temp: 98.2 °F (36.8 °C) (02/21/19 1203)  Pulse: 79 (02/21/19 1203)  Resp: 16 (02/21/19 1203)  BP: 117/67 (02/21/19 1203)  SpO2: 98 % (02/21/19 1203) Vital Signs (24h Range):  Temp:  [98 °F (36.7 °C)-102.9 °F (39.4 °C)] 98.2 °F (36.8 °C)  Pulse:  [] 79  Resp:  [16-18] 16  SpO2:  [94 %-98 %] 98 %  BP: (114-126)/(54-67) 117/67     Weight: 133.4 kg (294 lb 1.5 oz)  Body mass index is 48.94 kg/m².    Intake/Output Summary (Last 24 hours) at 2/21/2019 1623  Last data filed at 2/21/2019 0617  Gross per 24 hour   Intake 4141.66 ml   Output --   Net 4141.66 ml      Physical Exam   Constitutional: She is oriented to person, place, and time. She appears well-developed and well-nourished. No distress.   HENT:   Head: Normocephalic and atraumatic.   Mouth/Throat: Normal dentition. No dental abscesses, lacerations or dental caries. No oropharyngeal exudate.   Eyes: Conjunctivae are normal. No scleral icterus.   Cardiovascular: Normal rate, regular rhythm and intact distal pulses.   No murmur heard.  Pulmonary/Chest: Effort normal and breath sounds normal. No respiratory distress. She has no wheezes. She has no rales.   Abdominal: Soft. Bowel " sounds are normal. She exhibits no distension. There is no tenderness. There is no rebound and no guarding.   No abdominal or pelvic tenderness   Genitourinary: Vagina normal.   Genitourinary Comments: No CVA tenderness   Musculoskeletal: She exhibits no edema.   Neurological: She is alert and oriented to person, place, and time. No cranial nerve deficit.   Skin: Skin is warm and dry. No rash noted. No erythema.   Psychiatric: She has a normal mood and affect. Her behavior is normal. Judgment and thought content normal.       Significant Labs:   BMP:   Recent Labs   Lab 02/21/19  0545   GLU 92      K 3.2*      CO2 23   BUN 6   CREATININE 0.8   CALCIUM 8.6*     CBC:   Recent Labs   Lab 02/20/19  0528 02/21/19  0545   WBC 4.92 4.27   HGB 9.3* 8.8*   HCT 29.5* 26.9*    142*       Significant Imaging: I have reviewed all pertinent imaging results/findings within the past 24 hours.    Assessment/Plan:      * Pyelonephritis    Fever, flank pain, evidence of infection on urinalysis.  She mets criteria for sepsis with fever, tachycardia, tachypnea, and urinary source.  Lactic acid normal.   Started empirically on Zosyn.  Persistent fevers.  CT and US of abdomen showing small amount of pelvic free fluid.  Gyn and ID consulted.  All cultures negative so far.  No evidence PID per Gyn.    Continue treatment of pyelo per ID.  Zosyn changed to Rocephin.  May need to consider possible non infectious source of fevers if they persists.     Right lower quadrant pain    Persistent fever and continue lower abdominal pain R>L. Blood and urine culture remains ngsf. Continue zosyn, IVF and tylenol/ibuprofen for fever. Concern for early appendicitis? Will repeat CT abdomen pelvis. Obtain lactic acid.      Diarrhea in adult patient    Probably due to antibiotics. C diff negative.        Mesenteric lymphadenitis    Per CT, self-limiting, continue supportive care.     Anemia    Normocytic, normochromic.  No baseline on file  but has been told in the past that she was anemic and has taken iron supplements in the past.  She denies obvious observed bleeding.  Low Iron studies. Start ferrous sulfate     Hyponatremia    Mild, asymptomatic, likely hypovolemic. Resolved.      Cigarette smoker    5 minutes spent counseling the patient on smoking cessation and she has not smoked in a few weeks due to feeling bad and is currently ready to stop smoking. She will be offereded a nicotine transdermal patch while hospitalized and monitored for withdrawal.  Will provide additional smoking cessation counseling prior to discharge.      Class 3 severe obesity due to excess calories without serious comorbidity with body mass index (BMI) of 40.0 to 44.9 in adult    BMI Body mass index is 48.94 kg/m².  Patient counseled on risks obesity imparts on overall health. Urged toward diet and lifestyle modifications to aid in weight reduction.        VTE Risk Mitigation (From admission, onward)        Ordered     IP VTE HIGH RISK PATIENT  Once      02/18/19 2041              Jonathan Chiu MD  Department of Hospital Medicine   [facility]

## 2019-02-22 VITALS
TEMPERATURE: 99 F | BODY MASS INDEX: 48.82 KG/M2 | SYSTOLIC BLOOD PRESSURE: 121 MMHG | RESPIRATION RATE: 18 BRPM | OXYGEN SATURATION: 96 % | HEART RATE: 75 BPM | HEIGHT: 65 IN | WEIGHT: 293 LBS | DIASTOLIC BLOOD PRESSURE: 68 MMHG

## 2019-02-22 PROBLEM — R10.31 RIGHT LOWER QUADRANT PAIN: Status: RESOLVED | Noted: 2019-02-19 | Resolved: 2019-02-22

## 2019-02-22 PROBLEM — E87.1 HYPONATREMIA: Status: RESOLVED | Noted: 2019-02-18 | Resolved: 2019-02-22

## 2019-02-22 PROBLEM — I88.0 MESENTERIC LYMPHADENITIS: Status: RESOLVED | Noted: 2019-02-18 | Resolved: 2019-02-22

## 2019-02-22 PROBLEM — R19.7 DIARRHEA IN ADULT PATIENT: Status: RESOLVED | Noted: 2019-02-19 | Resolved: 2019-02-22

## 2019-02-22 LAB
BACTERIA UR CULT: NO GROWTH
PROCALCITONIN SERPL IA-MCNC: 0.29 NG/ML

## 2019-02-22 PROCEDURE — 99233 SBSQ HOSP IP/OBS HIGH 50: CPT | Mod: ,,, | Performed by: PHYSICIAN ASSISTANT

## 2019-02-22 PROCEDURE — 25000003 PHARM REV CODE 250: Performed by: NURSE PRACTITIONER

## 2019-02-22 PROCEDURE — 25000003 PHARM REV CODE 250: Performed by: HOSPITALIST

## 2019-02-22 PROCEDURE — 63600175 PHARM REV CODE 636 W HCPCS: Performed by: HOSPITALIST

## 2019-02-22 PROCEDURE — 99233 PR SUBSEQUENT HOSPITAL CARE,LEVL III: ICD-10-PCS | Mod: ,,, | Performed by: PHYSICIAN ASSISTANT

## 2019-02-22 RX ORDER — PHENAZOPYRIDINE HYDROCHLORIDE 100 MG/1
100 TABLET, FILM COATED ORAL 3 TIMES DAILY PRN
Qty: 30 TABLET | Refills: 0 | Status: SHIPPED | OUTPATIENT
Start: 2019-02-22 | End: 2019-02-27

## 2019-02-22 RX ORDER — CIPROFLOXACIN 750 MG/1
750 TABLET, FILM COATED ORAL EVERY 12 HOURS
Qty: 28 TABLET | Refills: 0 | Status: SHIPPED | OUTPATIENT
Start: 2019-02-22 | End: 2019-03-08

## 2019-02-22 RX ORDER — FERROUS SULFATE 325(65) MG
325 TABLET, DELAYED RELEASE (ENTERIC COATED) ORAL DAILY
Refills: 0 | COMMUNITY
Start: 2019-02-22 | End: 2019-08-20

## 2019-02-22 RX ORDER — ACETAMINOPHEN 325 MG/1
650 TABLET ORAL EVERY 4 HOURS PRN
Refills: 0 | COMMUNITY
Start: 2019-02-22 | End: 2019-08-20

## 2019-02-22 RX ADMIN — DICYCLOMINE HYDROCHLORIDE 10 MG: 10 CAPSULE ORAL at 08:02

## 2019-02-22 RX ADMIN — IBUPROFEN 600 MG: 600 TABLET ORAL at 10:02

## 2019-02-22 RX ADMIN — CEFTRIAXONE 1 G: 1 INJECTION, SOLUTION INTRAVENOUS at 08:02

## 2019-02-22 RX ADMIN — PHENAZOPYRIDINE HYDROCHLORIDE 100 MG: 100 TABLET ORAL at 08:02

## 2019-02-22 RX ADMIN — FERROUS SULFATE TAB EC 325 MG (65 MG FE EQUIVALENT) 325 MG: 325 (65 FE) TABLET DELAYED RESPONSE at 08:02

## 2019-02-22 RX ADMIN — PHENAZOPYRIDINE HYDROCHLORIDE 100 MG: 100 TABLET ORAL at 11:02

## 2019-02-22 RX ADMIN — PANTOPRAZOLE SODIUM 40 MG: 40 TABLET, DELAYED RELEASE ORAL at 08:02

## 2019-02-22 NOTE — PROGRESS NOTES
WRITTEN HEALTHCARE DISCHARGE INFORMATION      Things that YOU are responsible for to Manage Your Care At Home:  1. Getting your prescriptions filled.  2. Taking you medications as directed. DO NOT MISS ANY DOSES!  3. Going to your follow-up doctor appointments. This is important because it allows the doctor to monitor your progress and to determine if any changes need to be made to your treatment plan.     If you are unable to make your follow up appointments, please call the number listed and reschedule this appointment.      After discharge, if you need assistance, you can call [facility] for 24/7 assistance at [phone]     If you are experience any signs or symptoms, Call your doctor or Call 911 and come to your nearest Emergency Room.     Thank you for choosing [facility] and allowing us to care for you.        You should receive a call from [facility] within 48-72 hours to help manage your care after discharge. Please try to make sure that you answer your phone for this important phone call.     Follow-up Information     Michelle Hadley MD. Go on 2/27/2019.    Specialty:  Family Medicine  Why:  PCP follow up, please arrive at 1:30 PM   Contact information:  [id number] [place] [id number]  [phone]

## 2019-02-22 NOTE — SUBJECTIVE & OBJECTIVE
Interval History: Afebrile over last 24 hours. No leukocytosis. Started her menstrual cycle. has come cramping today.  Otherwise, no new complaints. Symptoms improved.    Review of Systems   Constitutional: Positive for appetite change (decreased) and chills. Negative for diaphoresis, fatigue and fever.   HENT: Negative for congestion, sinus pressure, sinus pain and sore throat.    Eyes: Negative for visual disturbance.   Respiratory: Negative for cough and shortness of breath.    Cardiovascular: Negative for chest pain.   Gastrointestinal: Positive for abdominal pain (lower - improved), diarrhea (started after admission) and nausea. Negative for constipation and vomiting (resolved).   Genitourinary: Positive for dysuria (burning) and vaginal bleeding. Negative for difficulty urinating, flank pain, frequency, hematuria, urgency and vaginal discharge.   Musculoskeletal: Positive for back pain (R). Negative for arthralgias.   Skin: Negative for rash and wound.   Allergic/Immunologic: Negative for immunocompromised state.   Neurological: Positive for weakness. Negative for headaches.   Psychiatric/Behavioral: Negative for confusion. The patient is not nervous/anxious.      Objective:     Vital Signs (Most Recent):  Temp: 98.9 °F (37.2 °C) (02/22/19 0723)  Pulse: 85 (02/22/19 0723)  Resp: 18 (02/22/19 0723)  BP: 126/76 (02/22/19 0836)  SpO2: 100 % (02/22/19 0723) Vital Signs (24h Range):  Temp:  [98.1 °F (36.7 °C)-98.9 °F (37.2 °C)] 98.9 °F (37.2 °C)  Pulse:  [75-94] 85  Resp:  [16-18] 18  SpO2:  [97 %-100 %] 100 %  BP: ()/(50-76) 126/76     Weight: (!) 137.6 kg (303 lb 5.7 oz)  Body mass index is 50.48 kg/m².    Estimated Creatinine Clearance: 156.6 mL/min (based on SCr of 0.8 mg/dL).    Physical Exam   Constitutional: She is oriented to person, place, and time. She appears well-developed and well-nourished. No distress.   HENT:   Head: Normocephalic and atraumatic.   Mouth/Throat: Normal dentition. No dental  abscesses, lacerations or dental caries. No oropharyngeal exudate.   No oral lesions   Eyes: Conjunctivae are normal. No scleral icterus.   Cardiovascular: Normal rate, regular rhythm and intact distal pulses.   No murmur heard.  Pulmonary/Chest: Effort normal and breath sounds normal. No respiratory distress. She has no wheezes. She has no rales.   Abdominal: Soft. Bowel sounds are normal. She exhibits no distension. There is no tenderness. There is no rebound and no guarding.   No abdominal or pelvic tenderness   Genitourinary: Vagina normal.   Genitourinary Comments: Mild right sided CVA tenderness   Musculoskeletal: She exhibits no edema.   Neurological: She is alert and oriented to person, place, and time.   Skin: Skin is warm and dry. No rash noted. No erythema.   Psychiatric: She has a normal mood and affect. Her behavior is normal. Judgment and thought content normal.       Significant Labs: All pertinent labs within the past 24 hours have been reviewed.    Significant Imaging: I have reviewed all pertinent imaging results/findings within the past 24 hours.

## 2019-02-22 NOTE — PLAN OF CARE
"   02/22/19 1205   Final Note   Assessment Type Final Discharge Note   Anticipated Discharge Disposition Home   Hospital Follow Up  Appt(s) scheduled? Yes   Discharge plans and expectations educations in teach back method with documentation complete? Yes   Right Care Referral Info   Post Acute Recommendation No Care       EDUCATION:  provided with educational information on pyelonephritis.  Information reviewed and placed in :My Healthcare Packet" to be brought home  to use as resource after discharge.  Information included:  signs and symptoms to look for and call the doctor if experiencing, and symptoms that may indicate a medical emergency: CALL 911.      All questions answered.  Teach back method used.    Patient stated, " will follow up with PCP as scheduled and take medication as ordered ".      1225- pts nurse Michelle advised all cm needs addressed and pt can d/c from CM standpoint    "

## 2019-02-22 NOTE — DISCHARGE SUMMARY
"[facility] Medicine  Discharge Summary      Patient Name: Frankie Garcia  MRN: [id number]  Admission Date: 2/18/2019  Hospital Length of Stay: 3 days  Discharge Date and Time:  02/22/2019 12:16 PM  Attending Physician: Jonathan Chiu MD   Discharging Provider: Jonathan Chiu MD  Primary Care Provider: Michelle Hadley MD      HPI:   23 y.o. female with obesity and tobacco use presents with a complaint of body aches, fever (T max 103.6 F), flank pain, lower abdominal pain, nausea, vomiting for the past week.  Reports she was diagnosed with UTI 6 days ago and started on Bactrim with no improvement of symptoms, went to urgent care yesterday and was prescribed Keflex.  She is also taken azo, Tylenol, and ibuprofen.  She denies cough, SOB, palpitations, dizziness, syncope, diarrhea, bloody or black stools.  In the ED her urinalysis showed evidence of infection.  Routine labs show mild hyponatremia, mild metabolic acidosis, and mild anemia.  UPT negative.  CT abdomen pelvis showed inflammatory changes within the lower abdomen, more pronounced within the right lower quadrant and right adnexal region of uncertain etiology with small amount of right adnexal and pelvic free fluid seen.  No evidence of organized fluid collection or rim enhancing abscess.  Few prominent lymph nodes are seen in the region which may reflect mesenteric adenitis.  Pelvic ultrasound pending.  Blood in urine cultures were obtained.  IV fluids and IV antibiotics were initiated.  Placed in observation for further evaluation and treatment.      * No surgery found *      Hospital Course:   Patient dmitted for pyeloneophritis. CT AP with Contrast "Inflammatory changes seen within the lower abdomen, more pronounced within the right lower quadrant and right adnexal region of uncertain etiology with small amount of right adnexal and pelvic free fluid seen.  No evidence of organized fluid collection or rim enhancing abscess. " " Few prominent lymph nodes are seen in the region which may reflect mesenteric adenitis." CT also showed "distal ileal loops within the right lower quadrant course through the region of inflammatory change."  US of pelviic "Free fluid seen in the pelvis which is slightly greater than expected normal physiologic volume.Otherwise no significant abnormalities identified."   Patient with persistent fevers.  Gyn and ID consulted.  No evidence of PID per Gyn.  Continue treatment of pyelonephritis per ID.  Zosyn changed to Rocephin.  All cultures during hospital stay were negative.  Cultures from Urgent Care were also negative.  ID followed and still think probable pyelonephritis.  Ruptured ovarian cyst on differential.  Fevers have finally resolved.  ID recommending 14 days of Cipro.  Patient with significant elevation of inflammatory markers.  Discussed other possible etiologies of non infectious fevers (autoimmune disease?).  Suggested going to [facility] if fevers persists where she could get Rheumatology input.  Hopefully this is all related to pyelo and everything will resolve with ABx's.  She is currently afebrile and hemodynamically stable.  She will be discharged home to follow up with PCP.     Consults:   Consults (From admission, onward)        Status Ordering Provider     Inpatient consult to Gynecology  Once     Provider:  Harinder Alatorre MD    Completed TEENA NARANJO     Inpatient consult to Infectious Diseases  Once     Provider:  Damian Figueroa MD    Completed STEPHENIE ROBERTS          No new Assessment & Plan notes have been filed under this hospital service since the last note was generated.  Service: Hospital Medicine    Final Active Diagnoses:    Diagnosis Date Noted POA    PRINCIPAL PROBLEM:  Pyelonephritis [N12] 02/18/2019 Yes    Cigarette smoker [F17.210] 02/18/2019 Yes     Chronic    Anemia [D64.9] 02/18/2019 Yes    Class 3 severe obesity due to excess calories without serious " comorbidity with body mass index (BMI) of 40.0 to 44.9 in adult [E66.01, Z68.41] 02/24/2017 Not Applicable     Chronic      Problems Resolved During this Admission:    Diagnosis Date Noted Date Resolved POA    Diarrhea in adult patient [R19.7] 02/19/2019 02/19/2019 Yes    Diarrhea in adult patient [R19.7] 02/19/2019 02/22/2019 Yes    Right lower quadrant pain [R10.31] 02/19/2019 02/22/2019 Yes    Hyponatremia [E87.1] 02/18/2019 02/22/2019 Yes    Mesenteric lymphadenitis [I88.0] 02/18/2019 02/22/2019 Yes       Discharged Condition: stable    Disposition: Home or Self Care    Follow Up:  Follow-up Information     Michelle Hadley MD. Go on 2/27/2019.    Specialty:  Family Medicine  Why:  PCP follow up, please arrive at 1:30 PM   Contact information:  [address]  [place] [address]  [phone]                 Patient Instructions:      Diet Adult Regular     Notify your health care provider if you experience any of the following:  temperature >100.4     Notify your health care provider if you experience any of the following:  persistent nausea and vomiting or diarrhea     Notify your health care provider if you experience any of the following:  severe uncontrolled pain     Notify your health care provider if you experience any of the following:  difficulty breathing or increased cough     Notify your health care provider if you experience any of the following:  persistent dizziness, light-headedness, or visual disturbances     Notify your health care provider if you experience any of the following:  increased confusion or weakness     Activity as tolerated       Pending Diagnostic Studies:     None         Medications:  Reconciled Home Medications:      Medication List      START taking these medications    acetaminophen 325 MG tablet  Commonly known as:  TYLENOL  Take 2 tablets (650 mg total) by mouth every 4 (four) hours as needed for Pain or Temperature greater than (100).     ciprofloxacin HCl  750 MG tablet  Commonly known as:  CIPRO  Take 1 tablet (750 mg total) by mouth every 12 (twelve) hours. for 14 days     ferrous sulfate 325 (65 FE) MG EC tablet  Take 1 tablet (325 mg total) by mouth once daily.     phenazopyridine 100 MG tablet  Commonly known as:  PYRIDIUM  Take 1 tablet (100 mg total) by mouth 3 (three) times daily as needed for Pain (Dysuria).            Indwelling Lines/Drains at time of discharge:   Lines/Drains/Airways          None          Time spent on the discharge of patient: >30 minutes  Patient was seen and examined on the date of discharge and determined to be suitable for discharge.         Jonathan Chiu MD  Department of Hospital Medicine  [facility]

## 2019-02-22 NOTE — PLAN OF CARE
Problem: Adult Inpatient Plan of Care  Goal: Plan of Care Review  Outcome: Ongoing (interventions implemented as appropriate)   02/22/19 1314   Plan of Care Review   Plan of Care Reviewed With patient;mother       Problem: Pain Acute  Goal: Optimal Pain Control  Outcome: Ongoing (interventions implemented as appropriate)  Intervention: Develop Pain Management Plan   02/22/19 1314   Prevent or Manage Pain   Pain Management Interventions relaxation techniques promoted;pain management plan reviewed with patient/caregiver         Problem: UTI (Urinary Tract Infection)  Goal: Improved Infection Symptoms  Outcome: Ongoing (interventions implemented as appropriate)  Intervention: Prevent and Manage Infection Progression   02/22/19 1314   Prevent or Manage Infection   Fever Reduction/Comfort Measures medication administered   Infection Management aseptic technique maintained         Problem: Infection  Goal: Infection Symptom Resolution  Outcome: Ongoing (interventions implemented as appropriate)  Intervention: Prevent or Manage Infection   02/22/19 1314   Prevent or Manage Infection   Fever Reduction/Comfort Measures medication administered   Infection Management aseptic technique maintained

## 2019-02-22 NOTE — ASSESSMENT & PLAN NOTE
23 year old female admitted with fevers, dysuria, flank pain, abdominal pain and nausea/vomiting despite outpatient Bactrim (see full HPI for detailed history). She has history of recent unprotected sex exposure. She was seen at [facility] on Sunday where she was prescribed Cephalexin (only took two doses) for a UTI. I Spoke with [facility] today and her urine culture returned with normal urogenital lisbeth and G/C is negative.    She has been febrile up to 103, otherwise VSS. Her UA revealed pyuria, though blood and urine cultures are negative. CT abdomen showed inflammatory changes within the lower abdomen (R>L) and free fluid in her pelvis. No focal fluid collections suggesting abscess formation. She has some small bowel inflammation but large bowel appears unremarkable. Right sided ovarian cyst noted on TVUS. Has been evaluated by Gynecology. Also developed diarrhea after admission. C. Diff, stool culture, O&P negative. G&C, HIV, RPR negative.       She is currently on Ceftriaxone and reports overall symptomatic improvement. No pelvic or abdominal pain on exam. Mild R CVA tenderness. Now afebrile over the last 24 hours. No leukocytosis. HDS. Procalc 0.29.      Plan   Suspect her symptoms are secondary to a UTI with possible pyelonephritis given the flank pain. Also high up on the differential is a ruptured ovarian cyst. She has improved on IV antibiotics and fevers appear to have resolved. Recommend discharge on Ciprofloxacin 750 mg PO twice daily x 14 days of antibiotics total for pyelonephritis with close follow up in ID clinic. Recommend repeat basic labs and inflammatory markers at follow up.

## 2019-02-22 NOTE — NURSING
Pt discharge teaching was implemented along with paperwork. D/c IV site, catheter intact. Pt AAOx4 VS were stable. Pt verbalizes understanding and no questions were asked. Pt received a return to work slip. Pt's mother @bedside.

## 2019-02-22 NOTE — PROGRESS NOTES
[facility]  Infectious Disease  Progress Note    Patient Name: Frankie Garcia  MRN: [id number]  Admission Date: 2/18/2019  Length of Stay: 3 days  Attending Physician: Jonathan Chiu MD  Primary Care Provider: Michelle Hadley MD    Isolation Status: No active isolations  Assessment/Plan:      * Pyelonephritis    23 year old female admitted with fevers, dysuria, flank pain, abdominal pain and nausea/vomiting despite outpatient Bactrim (see full HPI for detailed history). She has history of recent unprotected sex exposure. She was seen at [facility] on Sunday where she was prescribed Cephalexin (only took two doses) for a UTI. I Spoke with [facility] today and her urine culture returned with normal urogenital lisbeth and G/C is negative.    She has been febrile up to 103, otherwise VSS. Her UA revealed pyuria, though blood and urine cultures are negative. CT abdomen showed inflammatory changes within the lower abdomen (R>L) and free fluid in her pelvis. No focal fluid collections suggesting abscess formation. She has some small bowel inflammation but large bowel appears unremarkable. Right sided ovarian cyst noted on TVUS. Has been evaluated by Gynecology. Also developed diarrhea after admission. C. Diff, stool culture, O&P negative. G&C, HIV, RPR negative.       She is currently on Ceftriaxone and reports overall symptomatic improvement. No pelvic or abdominal pain on exam. Mild R CVA tenderness. Now afebrile over the last 24 hours. No leukocytosis. HDS. Procalc 0.29.      Plan   Suspect her symptoms are secondary to a UTI with possible pyelonephritis given the flank pain. Also high up on the differential is a ruptured ovarian cyst. She has improved on IV antibiotics and fevers appear to have resolved. Recommend discharge on Ciprofloxacin 750 mg PO twice daily x 14 days of antibiotics total for pyelonephritis with close follow up in ID clinic. Recommend repeat basic labs and inflammatory  "markers at follow up.          Thank you for the consult. ID will sign off. Please call for any questions.    Jade Gilmore PA-C   Pager: [phone]  Infectious Disease  [facility]    Subjective:     Principal Problem:Pyelonephritis    HPI: Frankie Garcia is a 23 year old female with history of tobacco use who presents with a one week history of dysuria, burning with urination, flank pain and lower abdominal pain. She initially self-treated with Bactrim and AZO at home for a presumed UTI (family member had an old script). Her symptoms did not improve. On Sunday she started having fevers along with nausea and vomiting. She was seen at [facility] urgent care and started on Cephalexin after being diagnosed with a UTI. I have called [facility] and her urine culture and G/C are still pending.    She reports recent history of unprotected sex. She is unaware if her partner has any STDs. She is not on birth control. Denies recurrent UTIs. Two months ago she was diagnosed with Influenza and pneumonia for which she was treated for. No current cough or SOB. No diarrhea, bloody stools, vaginal discharge or bleeding at home. Denies sores, rash, ulcerations. She has traveled to the [facility] > 5 years ago. No other recent travel or exposure to sick contacts. Otherwise, no significant infectious history.     In the ED her urinalysis showed positive nitrites, 3 WBC and many bacteria. Urine cx and Blood culture from admit are negative. Routine labs show mild hyponatremia, mild metabolic acidosis, and mild anemia. No leukocytosis. UPT negative.  CT abdomen pelvis showed "inflammatory changes within the lower abdomen, more pronounced within the right lower quadrant and right adnexal region of uncertain etiology with small amount of right adnexal and pelvic free fluid seen.  No evidence of organized fluid collection or rim enhancing abscess.  Few prominent lymph nodes are seen in the region which may reflect mesenteric " "adenitis". Transvaginal US showed free fluid in pelvis, slightly greater than expected. She was started on  IV fluids and given a dose of IV Ceftriaxone. Now on Zosyn. ID consulted for antibiotic recommendations.    Since her admission she has remained febrile up to 103. Otherwise, VSS. Repeat UA now with 30 WBC and trace leukocytosis. Repeat Urine cx pending. CT Increased inflammatory changes seen in the lower quadrants right greater than left.  Increase in small right arm adnexal and free pelvic fluid.  Distal ileal loops are seen coursing through the area of inflammatory change. C. Diff negative. Stool WBC with occasional neutrophils. Chlamydia and Gonorrhea screen pending. Stool cx pending. Gynecology has been consulted.    She now has diarrhea, possibly secondary to antibiotic exposure. Urinary symptoms improving. Vomiting resolved and appetite and abdominal pain are improving. Has persistent fevers and sweats.      Interval History: Afebrile over last 24 hours. No leukocytosis. Started her menstrual cycle. has come cramping today.  Otherwise, no new complaints. Symptoms improved.    Review of Systems   Constitutional: Positive for appetite change (decreased) and chills. Negative for diaphoresis, fatigue and fever.   HENT: Negative for congestion, sinus pressure, sinus pain and sore throat.    Eyes: Negative for visual disturbance.   Respiratory: Negative for cough and shortness of breath.    Cardiovascular: Negative for chest pain.   Gastrointestinal: Positive for abdominal pain (lower - improved), diarrhea (started after admission) and nausea. Negative for constipation and vomiting (resolved).   Genitourinary: Positive for dysuria (burning) and vaginal bleeding. Negative for difficulty urinating, flank pain, frequency, hematuria, urgency and vaginal discharge.   Musculoskeletal: Positive for back pain (R). Negative for arthralgias.   Skin: Negative for rash and wound.   Allergic/Immunologic: Negative for " immunocompromised state.   Neurological: Positive for weakness. Negative for headaches.   Psychiatric/Behavioral: Negative for confusion. The patient is not nervous/anxious.      Objective:     Vital Signs (Most Recent):  Temp: 98.9 °F (37.2 °C) (02/22/19 0723)  Pulse: 85 (02/22/19 0723)  Resp: 18 (02/22/19 0723)  BP: 126/76 (02/22/19 0836)  SpO2: 100 % (02/22/19 0723) Vital Signs (24h Range):  Temp:  [98.1 °F (36.7 °C)-98.9 °F (37.2 °C)] 98.9 °F (37.2 °C)  Pulse:  [75-94] 85  Resp:  [16-18] 18  SpO2:  [97 %-100 %] 100 %  BP: ()/(50-76) 126/76     Weight: (!) 137.6 kg (303 lb 5.7 oz)  Body mass index is 50.48 kg/m².    Estimated Creatinine Clearance: 156.6 mL/min (based on SCr of 0.8 mg/dL).    Physical Exam   Constitutional: She is oriented to person, place, and time. She appears well-developed and well-nourished. No distress.   HENT:   Head: Normocephalic and atraumatic.   Mouth/Throat: Normal dentition. No dental abscesses, lacerations or dental caries. No oropharyngeal exudate.   No oral lesions   Eyes: Conjunctivae are normal. No scleral icterus.   Cardiovascular: Normal rate, regular rhythm and intact distal pulses.   No murmur heard.  Pulmonary/Chest: Effort normal and breath sounds normal. No respiratory distress. She has no wheezes. She has no rales.   Abdominal: Soft. Bowel sounds are normal. She exhibits no distension. There is no tenderness. There is no rebound and no guarding.   No abdominal or pelvic tenderness   Genitourinary: Vagina normal.   Genitourinary Comments: Mild right sided CVA tenderness   Musculoskeletal: She exhibits no edema.   Neurological: She is alert and oriented to person, place, and time.   Skin: Skin is warm and dry. No rash noted. No erythema.   Psychiatric: She has a normal mood and affect. Her behavior is normal. Judgment and thought content normal.       Significant Labs: All pertinent labs within the past 24 hours have been reviewed.    Significant Imaging: I have  reviewed all pertinent imaging results/findings within the past 24 hours.

## 2019-02-23 LAB
BACTERIA BLD CULT: NORMAL
BACTERIA BLD CULT: NORMAL
BACTERIA STL CULT: NORMAL

## 2019-02-27 ENCOUNTER — OFFICE VISIT (OUTPATIENT)
Dept: FAMILY MEDICINE | Facility: CLINIC | Age: 24
End: 2019-02-27
Payer: COMMERCIAL

## 2019-02-27 VITALS
OXYGEN SATURATION: 97 % | HEIGHT: 65 IN | BODY MASS INDEX: 47.71 KG/M2 | RESPIRATION RATE: 16 BRPM | SYSTOLIC BLOOD PRESSURE: 100 MMHG | WEIGHT: 286.38 LBS | TEMPERATURE: 98 F | HEART RATE: 86 BPM | DIASTOLIC BLOOD PRESSURE: 74 MMHG

## 2019-02-27 DIAGNOSIS — N12 PYELONEPHRITIS: ICD-10-CM

## 2019-02-27 DIAGNOSIS — Z09 HOSPITAL DISCHARGE FOLLOW-UP: Primary | ICD-10-CM

## 2019-02-27 PROCEDURE — 3008F PR BODY MASS INDEX (BMI) DOCUMENTED: ICD-10-PCS | Mod: CPTII,S$GLB,, | Performed by: FAMILY MEDICINE

## 2019-02-27 PROCEDURE — 99999 PR PBB SHADOW E&M-EST. PATIENT-LVL III: CPT | Mod: PBBFAC,,, | Performed by: FAMILY MEDICINE

## 2019-02-27 PROCEDURE — 99999 PR PBB SHADOW E&M-EST. PATIENT-LVL III: ICD-10-PCS | Mod: PBBFAC,,, | Performed by: FAMILY MEDICINE

## 2019-02-27 PROCEDURE — 3008F BODY MASS INDEX DOCD: CPT | Mod: CPTII,S$GLB,, | Performed by: FAMILY MEDICINE

## 2019-02-27 PROCEDURE — 99214 PR OFFICE/OUTPT VISIT, EST, LEVL IV, 30-39 MIN: ICD-10-PCS | Mod: S$GLB,,, | Performed by: FAMILY MEDICINE

## 2019-02-27 PROCEDURE — 99214 OFFICE O/P EST MOD 30 MIN: CPT | Mod: S$GLB,,, | Performed by: FAMILY MEDICINE

## 2019-02-27 RX ORDER — ONDANSETRON 4 MG/1
4 TABLET, FILM COATED ORAL EVERY 6 HOURS PRN
Refills: 0 | COMMUNITY
Start: 2019-02-06 | End: 2019-08-20

## 2019-02-27 NOTE — PHYSICIAN QUERY
PT Name: Frankie Garcia  MR #: [id number]     PHYSICIAN QUERY -  ACUITY OF CONDITION CLARIFICATION      CDS/: Radha Gloria RN               Contact information:[email or web address]  This form is a permanent document in the medical record.     Query Date: February 26, 2019    By submitting this query, we are merely seeking further clarification of documentation to reflect the severity of illness of your patient. Please utilize your independent clinical judgment when addressing the question(s) below.    The Medical record reflects the following:     Indicators   Supporting Clinical Findings Location in Medical Record   x Documentation of condition Pyelonephritis.  Fever, flank pain, evidence of infection on urinalysis. H&P   x Lab Value(s) H&H=11.9/36.4   H&H=9.8/31.5  H&H=9.3/29.5  H&H=8.8/26.9 Labs 2/18  Labs 2/19  Labs 2/20  Labs 2/21   x Radiology Findings Inflammatory changes seen within the lower abdomen, more pronounced within the right lower quadrant and right adnexal region of uncertain etiology with small amount of right adnexal and pelvic free fluid seen.  No evidence of organized fluid collection or rim enhancing abscess.  Few prominent lymph nodes are seen in the region which may reflect mesenteric adenitis. CT abdomen 2/18   x Treatment                                 Medication Piperacillin-tazobactam IVPB  Ceftriaxone IVPB MAR 2/18-21  MAR 2/18, 2/21-22   x Other Pt was dx with UTI on Wed and started abx. Abx was changed yesterday from urgent care. Pt reports continued fever and increasing abdominal pain and back pain today. fever (T max 103.6 F), flank pain, lower abdominal pain, nausea, vomiting for the past week. diagnosed with UTI 6 days ago and started on Bactrim with no improvement of symptoms H&P     Provider, please specify the acuity/chronicity of _Pyelonephritis_____:    [ x  ] Acute   [   ] Chronic   [   ] Acute and/on chronic   [   ] Past history only, not a current diagnosis    [   ] Ruled Out   [   ]  Clinically Undetermined       Please document in your progress notes daily for the duration of treatment until resolved, and include in your discharge summary.

## 2019-02-27 NOTE — PHYSICIAN QUERY
PT Name: Frankie Garcia  MR #: [id number]    Physician Query Form -Systemic Infectious Process Clarification     CDS/: Radha Gloria RN               Contact information:[email or web address]  This form is a permanent document in the medical record.     Query Date: February 26, 2019     By submitting this query, we are merely seeking further clarification of documentation. Please utilize your independent clinical judgment when addressing the question(s) below.    The Medical record contains the following:     Indicators   Supporting Clinical Findings   Location in Medical Record   x HR RR BP Temp    /76   Temp 101.9  HR 96     /55  Temp 101.5                                    Temp  103     /57   Temp  100.6                                    Temp 102  HR  BP Temp  102.9   Vital signs 2/18 @ 2041  Vital signs 2/19 @ 0442  Vital signs 2/20 @ 0146  Vital signs 2/20 @ 0813  Vital signs 2/20 @ 0930  Vital signs 2/21 @ 0141     x Lactic Acid             Procalcitonin Lactate 0.7  Lactate 0.6  Procalcitonin 0.29 Labs 2/18  Labs 2/19  Labs 2/21   x WBC                Bands                     CRP WBC 7.42  WBC 6.32  WBC 4.92  WBC 4.27  .3 Labs 2/18  Labs 2/19  Labs 2/20  Labs 2/21   x Culture(s) Blood culture no growth after 5 days  Urine culture no growth Labs 2/18  Labs 2/18    AMS, Confusion, LOC, etc.      Organ Dysfunction / Failure      Bacteremia or Sepsis / Septic     x Known or Suspected Source of Infection documented Pyelonephritis.  Fever, flank pain, evidence of infection on urinalysis.  She meets criteria for sepsis with fever, tachycardia, tachypnea, and urinary source.    H&P   x (Failed) Outpatient Treatment Pt was dx with UTI on Wed and started abx. Abx was changed yesterday from urgent care. Pt reports continued fever and increasing abdominal pain and back pain today.diagnosed with UTI 6 days ago and started on Bactrim with no improvement of symptoms,  went to urgent care yesterday and was prescribed Keflex.   H&P   x Medication Piperacillin-tazobactam IVPB  Ceftriaxone IVPB MAR 2/18-21  MAR 2/18, 2/21-22    Treatment     x Other  Mesenteric lymphadenitis  She meets criteria for sepsis with fever, tachycardia, tachypnea, and urinary source.  H&P     Provider, please specify diagnosis or diagnoses associated with above clinical findings.      [   ] Severe Sepsis with Acute Organ Dysfunction/Failure (please specify  organ dysfunction/failure): ___________________   [   ] Other Infectious Disease (please specify): _________________________________   [ x  ] Other: sepsis secondary to urinary source   [  ]  Clinically Undetermined         Please document in your progress notes daily for the duration of treatment until resolved and include in your discharge summary.

## 2019-02-27 NOTE — PHYSICIAN QUERY
"PT Name: Frankie Garcia  MR #: [id number]    Physician Query Form - Hematology Clarification      CDS/: Radha Gloria RN               Contact information:[email or web address]    This form is a permanent document in the medical record.      Query Date: February 26, 2019    By submitting this query, we are merely seeking further clarification of documentation. Please utilize your independent clinical judgment when addressing the question(s) below.    The Medical record contains the following:   Indicators  Supporting Clinical Findings Location in Medical Record   x "Anemia" documented Anemia     Normocytic, normochromic.  No baseline on file but has been told in the past that she was anemic and has taken iron supplements in the past    H&P   x H & H = H&H=11.9/36.4  Iron= 15  TIBC 329  Saturated iron  5  Transferrin 222   Ferritin 145  H&H=9.8/31.5  H&H=9.3/29.5  H&H=8.8/26.9 Labs 2/18    Labs 2/19  Labs 2/20  Labs 2/21    BP =                     HR=      "GI bleeding" documented      Acute bleeding (Non GI site)      Transfusion(s)     x Treatment: Ferrous sulfate EC  MAR 2/19-22   x Other:  Pyelonephritis, Mesenteric lymphadenitis  Discharge summary     Provider, please specify diagnosis or diagnoses associated with above clinical findings.    [  ] Acute blood loss anemia   [ x ] Iron deficiency anemia   [  ] Chronic blood loss anemia     [  ] Anemia of chronic disease ( Specify chronic disease)       [  ] Other (Specify):   [  ] Clinically Undetermined       [  ] Other Hematological Diagnosis (please specify):     [  ] Clinically Undetermined       Please document in your progress notes daily for the duration of treatment, until resolved, and include in your discharge summary.                                                                                                      "

## 2019-02-27 NOTE — PROGRESS NOTES
Subjective:       Patient ID: Frankie Garcia is a 23 y.o. female.    Chief Complaint: Hospital Follow Up    23 yea rolf female presents for hospital follow up. She states initially she hd pelvic pressure and urgency and took some bactrim that a family friend prescribed for her She later developed flank pain, nausea, vomiting, and fever. She was seen in urgent care and given keflex. This also didn't resolve her symptoms so she went to the ED. She was noted to have  pyuria and an elevated inflammatory markers. She was discharged on Cipro, Zofran and pyridium. She states her appetite is improving. She has not had a fever in 5 days. She has no vomiting or flank pain. She still has some lower pelvic cramping.     She feels like she still has urinary hesitancy and urgency.     Past Medical History:  No date: Cigarette smoker      Comment:  1ppd  2019: Influenza  2019: Pyelonephritis   Past Surgical History:  2019: NO PAST SURGERIES  Review of patient's family history indicates:  Problem: Arthritis      Relation: Father          Age of Onset: (Not Specified)  Problem: Diabetes      Relation: Maternal Aunt          Age of Onset: (Not Specified)    Social History    Socioeconomic History      Marital status: Single      Spouse name: Not on file      Number of children: Not on file      Years of education: Not on file      Highest education level: Not on file    Social Needs      Financial resource strain: Not on file      Food insecurity - worry: Not on file      Food insecurity - inability: Not on file      Transportation needs - medical: Not on file      Transportation needs - non-medical: Not on file    Occupational History      Not on file    Tobacco Use      Smoking status: Former Smoker        Packs/day: 0.07        Years: 1.00        Pack years: .07        Quit date: 2019        Years since quittin.1      Smokeless tobacco: Never Used    Substance and Sexual Activity      Alcohol use: Yes      "   Alcohol/week: 0.0 oz        Comment: socially      Drug use: No      Sexual activity: Yes        Partners: Female        Birth control/protection: None    Other Topics      Concerns:        Not on file    Social History Narrative      Not on file          Review of Systems    Objective:       Vitals:    02/27/19 1309   BP: 100/74   Pulse: 86   Resp: 16   Temp: 98.2 °F (36.8 °C)   TempSrc: Oral   SpO2: 97%   Weight: 129.9 kg (286 lb 6 oz)   Height: 5' 5" (1.651 m)       Physical Exam   Constitutional: She is oriented to person, place, and time. She appears well-developed and well-nourished. No distress.   HENT:   Head: Normocephalic and atraumatic.   Eyes: Conjunctivae are normal.   Cardiovascular: Normal rate, regular rhythm and normal heart sounds. Exam reveals no gallop and no friction rub.   No murmur heard.  Pulmonary/Chest: Effort normal and breath sounds normal. No stridor. No respiratory distress. She has no wheezes. She has no rales.   Abdominal: There is no CVA tenderness.   Neurological: She is alert and oriented to person, place, and time.   Skin: She is not diaphoretic.       Assessment:       1. Hospital discharge follow-up    2. Pyelonephritis        Plan:       Frankie was seen today for hospital follow up.    Diagnoses and all orders for this visit:    Hospital discharge follow-up    Pyelonephritis  -     Sedimentation rate; Future  -     C-reactive protein; Future       continue cipro and zofran prn. No sex until infection clears. encourged increase water inteake.   "

## 2019-03-01 ENCOUNTER — TELEPHONE (OUTPATIENT)
Dept: INFECTIOUS DISEASES | Facility: CLINIC | Age: 24
End: 2019-03-01

## 2019-03-01 ENCOUNTER — PATIENT MESSAGE (OUTPATIENT)
Dept: INFECTIOUS DISEASES | Facility: CLINIC | Age: 24
End: 2019-03-01

## 2019-03-01 DIAGNOSIS — L29.9 PRURITUS: Primary | ICD-10-CM

## 2019-03-01 RX ORDER — HYDROXYZINE HYDROCHLORIDE 25 MG/1
25 TABLET, FILM COATED ORAL 3 TIMES DAILY PRN
Qty: 42 TABLET | Refills: 0 | Status: SHIPPED | OUTPATIENT
Start: 2019-03-01 | End: 2019-03-15

## 2019-03-01 NOTE — TELEPHONE ENCOUNTER
Patient sent me a message with a report of a rash.  Will give trial of hydroxyzine.  Advised her to stop taking the ciprofloxacin.

## 2019-03-04 ENCOUNTER — OFFICE VISIT (OUTPATIENT)
Dept: INFECTIOUS DISEASES | Facility: CLINIC | Age: 24
End: 2019-03-04
Payer: COMMERCIAL

## 2019-03-04 VITALS
BODY MASS INDEX: 46.53 KG/M2 | HEART RATE: 72 BPM | HEIGHT: 65 IN | SYSTOLIC BLOOD PRESSURE: 106 MMHG | WEIGHT: 279.31 LBS | DIASTOLIC BLOOD PRESSURE: 77 MMHG | TEMPERATURE: 98 F

## 2019-03-04 DIAGNOSIS — Z23 IMMUNIZATION DUE: ICD-10-CM

## 2019-03-04 DIAGNOSIS — R19.8 LEFT PELVIC ADNEXAL FLUID COLLECTION: Primary | ICD-10-CM

## 2019-03-04 PROCEDURE — 99213 PR OFFICE/OUTPT VISIT, EST, LEVL III, 20-29 MIN: ICD-10-PCS | Mod: S$GLB,,, | Performed by: INTERNAL MEDICINE

## 2019-03-04 PROCEDURE — 3008F BODY MASS INDEX DOCD: CPT | Mod: CPTII,S$GLB,, | Performed by: INTERNAL MEDICINE

## 2019-03-04 PROCEDURE — 99999 PR PBB SHADOW E&M-EST. PATIENT-LVL III: ICD-10-PCS | Mod: PBBFAC,,, | Performed by: INTERNAL MEDICINE

## 2019-03-04 PROCEDURE — 99213 OFFICE O/P EST LOW 20 MIN: CPT | Mod: S$GLB,,, | Performed by: INTERNAL MEDICINE

## 2019-03-04 PROCEDURE — 3008F PR BODY MASS INDEX (BMI) DOCUMENTED: ICD-10-PCS | Mod: CPTII,S$GLB,, | Performed by: INTERNAL MEDICINE

## 2019-03-04 PROCEDURE — 99999 PR PBB SHADOW E&M-EST. PATIENT-LVL III: CPT | Mod: PBBFAC,,, | Performed by: INTERNAL MEDICINE

## 2019-03-04 NOTE — PROGRESS NOTES
Subjective:      Patient ID: Frankie Garcia is a 23 y.o. female.    Chief Complaint:Hospital Follow Up    History of Present Illness    22 y/o here for hospital follow up.  She had flu like symptoms and later progressed to difficulty passing urine. She was prescribed bactrim.  This didn't help.  Her symptoms worsened and she started having burning on urination.  She went to urgent care and was given keflex.  She later developed fevers to 103.  She also developed lower back pain, lower left flank pain and lower abdominal pain.  She ultimately went to the hospital.  She was in the hospital for about 5 days.  She was discharged on ciprofloxacin.    She feels better today.  She is trying to get her stamina.  She has started working again.  She doesn't have anymore fevers.  She hasn't had any pain today or yesterday.  She is no longer having any problems with urination.  She had a rash last week that is now resolving.    Review of Systems   Constitution: Positive for decreased appetite, weakness, malaise/fatigue and weight loss.   Skin: Positive for itching and rash.   Gastrointestinal: Positive for abdominal pain and nausea.   Genitourinary: Positive for hesitancy.   Neurological: Positive for dizziness.     Objective:   Physical Exam   Constitutional: She is oriented to person, place, and time. She appears well-developed and well-nourished. No distress.   HENT:   Head: Normocephalic and atraumatic.   Right Ear: External ear normal.   Left Ear: External ear normal.   Nose: Nose normal.   Mouth/Throat: Oropharynx is clear and moist. No oropharyngeal exudate.   Eyes: Conjunctivae and EOM are normal. Pupils are equal, round, and reactive to light. Right eye exhibits no discharge. Left eye exhibits no discharge. No scleral icterus.   Neck: Normal range of motion. Neck supple. No JVD present. No tracheal deviation present. No thyromegaly present.   Cardiovascular: Normal rate, regular rhythm, normal heart sounds and intact  distal pulses. Exam reveals no gallop and no friction rub.   No murmur heard.  Pulmonary/Chest: Effort normal and breath sounds normal. No stridor. No respiratory distress. She has no wheezes. She has no rales. She exhibits no tenderness.   Abdominal: Soft. Bowel sounds are normal. She exhibits no distension and no mass. There is no tenderness. There is no rebound and no guarding.   Musculoskeletal: Normal range of motion. She exhibits no edema or tenderness.   Lymphadenopathy:     She has no cervical adenopathy.   Neurological: She is alert and oriented to person, place, and time. She displays normal reflexes. No cranial nerve deficit. She exhibits normal muscle tone. Coordination normal.   Skin: Skin is warm. No rash noted. She is not diaphoretic. No erythema. No pallor.   Psychiatric: She has a normal mood and affect. Her behavior is normal. Judgment and thought content normal.   Nursing note and vitals reviewed.    Assessment:       1. Left pelvic adnexal fluid collection :  Unclear cause of her symptoms.  Her urinary studies were negative for infection.  Ruptured ovarian cyst was also suspected.  She is doing well.  No further antibiotics needed.  She is to follow up as needed.   2. Immunization due :  I counseled her to get the HPV vaccine from her PCP.         Plan:       Left pelvic adnexal fluid collection    Immunization due

## 2019-03-06 ENCOUNTER — TELEPHONE (OUTPATIENT)
Dept: INFECTIOUS DISEASES | Facility: CLINIC | Age: 24
End: 2019-03-06

## 2019-03-06 NOTE — TELEPHONE ENCOUNTER
----- Message from Alyssa Logan sent at 3/6/2019  8:58 AM CST -----  Contact: Frankie  tel:    [phone]  ofc    Pt.wants you to fax a copy of her drs' excuse to her job:  She lost the original copy that you gave her.       Returning to work today,3/ 6th.  .   Excuse is to cover Monday.   Fax to : [phone]  Attn: Frankie /  Milagro fax this a.m.

## 2019-03-11 ENCOUNTER — LAB VISIT (OUTPATIENT)
Dept: LAB | Facility: HOSPITAL | Age: 24
End: 2019-03-11
Attending: FAMILY MEDICINE
Payer: COMMERCIAL

## 2019-03-11 DIAGNOSIS — N12 PYELONEPHRITIS: ICD-10-CM

## 2019-03-11 LAB
CRP SERPL-MCNC: 6.1 MG/L
ERYTHROCYTE [SEDIMENTATION RATE] IN BLOOD BY WESTERGREN METHOD: 17 MM/HR

## 2019-03-11 PROCEDURE — 36415 COLL VENOUS BLD VENIPUNCTURE: CPT | Mod: PO

## 2019-03-11 PROCEDURE — 85652 RBC SED RATE AUTOMATED: CPT

## 2019-03-11 PROCEDURE — 86140 C-REACTIVE PROTEIN: CPT

## 2019-03-12 ENCOUNTER — LAB VISIT (OUTPATIENT)
Dept: LAB | Facility: HOSPITAL | Age: 24
End: 2019-03-12
Attending: FAMILY MEDICINE
Payer: COMMERCIAL

## 2019-03-12 ENCOUNTER — OFFICE VISIT (OUTPATIENT)
Dept: FAMILY MEDICINE | Facility: CLINIC | Age: 24
End: 2019-03-12
Payer: COMMERCIAL

## 2019-03-12 VITALS
SYSTOLIC BLOOD PRESSURE: 116 MMHG | OXYGEN SATURATION: 97 % | HEIGHT: 65 IN | HEART RATE: 79 BPM | BODY MASS INDEX: 47.38 KG/M2 | WEIGHT: 284.38 LBS | DIASTOLIC BLOOD PRESSURE: 74 MMHG | TEMPERATURE: 99 F

## 2019-03-12 DIAGNOSIS — N39.0 URINARY TRACT INFECTION WITHOUT HEMATURIA, SITE UNSPECIFIED: Primary | ICD-10-CM

## 2019-03-12 DIAGNOSIS — N94.89 ADNEXAL MASS: ICD-10-CM

## 2019-03-12 DIAGNOSIS — N39.0 URINARY TRACT INFECTION WITHOUT HEMATURIA, SITE UNSPECIFIED: ICD-10-CM

## 2019-03-12 LAB
AMORPH CRY URNS QL MICRO: ABNORMAL
BACTERIA #/AREA URNS HPF: ABNORMAL /HPF
BILIRUB UR QL STRIP: NEGATIVE
CLARITY UR: ABNORMAL
COLOR UR: YELLOW
GLUCOSE UR QL STRIP: NEGATIVE
HGB UR QL STRIP: NEGATIVE
KETONES UR QL STRIP: NEGATIVE
LEUKOCYTE ESTERASE UR QL STRIP: ABNORMAL
MICROSCOPIC COMMENT: ABNORMAL
NITRITE UR QL STRIP: NEGATIVE
PH UR STRIP: 6 [PH] (ref 5–8)
PROT UR QL STRIP: NEGATIVE
RBC #/AREA URNS HPF: 1 /HPF (ref 0–4)
SP GR UR STRIP: 1.01 (ref 1–1.03)
SQUAMOUS #/AREA URNS HPF: 7 /HPF
URN SPEC COLLECT METH UR: ABNORMAL
UROBILINOGEN UR STRIP-ACNC: NEGATIVE EU/DL
WBC #/AREA URNS HPF: 10 /HPF (ref 0–5)

## 2019-03-12 PROCEDURE — 3008F PR BODY MASS INDEX (BMI) DOCUMENTED: ICD-10-PCS | Mod: CPTII,S$GLB,, | Performed by: FAMILY MEDICINE

## 2019-03-12 PROCEDURE — 99999 PR PBB SHADOW E&M-EST. PATIENT-LVL III: ICD-10-PCS | Mod: PBBFAC,,, | Performed by: FAMILY MEDICINE

## 2019-03-12 PROCEDURE — 99999 PR PBB SHADOW E&M-EST. PATIENT-LVL III: CPT | Mod: PBBFAC,,, | Performed by: FAMILY MEDICINE

## 2019-03-12 PROCEDURE — 99214 OFFICE O/P EST MOD 30 MIN: CPT | Mod: S$GLB,,, | Performed by: FAMILY MEDICINE

## 2019-03-12 PROCEDURE — 81000 URINALYSIS NONAUTO W/SCOPE: CPT

## 2019-03-12 PROCEDURE — 87086 URINE CULTURE/COLONY COUNT: CPT

## 2019-03-12 PROCEDURE — 99214 PR OFFICE/OUTPT VISIT, EST, LEVL IV, 30-39 MIN: ICD-10-PCS | Mod: S$GLB,,, | Performed by: FAMILY MEDICINE

## 2019-03-12 PROCEDURE — 3008F BODY MASS INDEX DOCD: CPT | Mod: CPTII,S$GLB,, | Performed by: FAMILY MEDICINE

## 2019-03-12 NOTE — PROGRESS NOTES
Subjective:       Patient ID: Frankie Garcia is a 23 y.o. female.    Chief Complaint: Discuss Health Concerns/ Results    23 year old female presents fo ra concerns about a rash. She states she has constant itching in her clothes. Her ID doctor stopped the Cipro as he felt it was an allergic reaction. She stopped the Cipro 8 days ago. She states he has the rash on her arms, legs, stomach, chest, and her back. She has no new lesions for this. She has vistaril for this, but didn't know a script was sent in     Past Medical History:  No date: Cigarette smoker      Comment:  1ppd  2019: Influenza  2019: Pyelonephritis   Past Surgical History:  2019: NO PAST SURGERIES  Review of patient's family history indicates:  Problem: Arthritis      Relation: Father          Age of Onset: (Not Specified)  Problem: Diabetes      Relation: Maternal Aunt          Age of Onset: (Not Specified)    Social History    Socioeconomic History      Marital status: Single      Spouse name: Not on file      Number of children: Not on file      Years of education: Not on file      Highest education level: Not on file    Social Needs      Financial resource strain: Not on file      Food insecurity - worry: Not on file      Food insecurity - inability: Not on file      Transportation needs - medical: Not on file      Transportation needs - non-medical: Not on file    Occupational History      Not on file    Tobacco Use      Smoking status: Former Smoker        Packs/day: 0.07        Years: 1.00        Pack years: .07        Quit date: 2019        Years since quittin.1      Smokeless tobacco: Never Used    Substance and Sexual Activity      Alcohol use: Yes        Alcohol/week: 0.0 oz        Comment: socially      Drug use: No      Sexual activity: Yes        Partners: Female        Birth control/protection: None    Other Topics      Concerns:        Not on file    Social History Narrative      Not on file          Review  "of Systems   Constitutional: Negative for chills, diaphoresis and fever.   Endocrine: Negative for polydipsia and polyuria.   Genitourinary: Negative for dysuria and frequency.       Objective:       Vitals:    03/12/19 0950   BP: 116/74   Pulse: 79   Temp: 98.6 °F (37 °C)   TempSrc: Oral   SpO2: 97%   Weight: 129 kg (284 lb 6.3 oz)   Height: 5' 5" (1.651 m)       Physical Exam   Constitutional: She appears well-developed and well-nourished. No distress.   HENT:   Head: Normocephalic and atraumatic.   Skin: She is not diaphoretic.            Assessment:       1. Urinary tract infection without hematuria, site unspecified    2. Adnexal mass        Plan:       Frankie was seen today for discuss health concerns/ results.    Diagnoses and all orders for this visit:    Urinary tract infection without hematuria, site unspecified  -     Urinalysis; Future  -     Urine culture; Future    Adnexal mass  -     US Pelvis Complete Non OB; Future  DUE FOR REPEAT US TO FOLLOW UP ON RIGHT ADNEXAL MASS.          "

## 2019-03-13 ENCOUNTER — HOSPITAL ENCOUNTER (OUTPATIENT)
Dept: RADIOLOGY | Facility: OTHER | Age: 24
Discharge: HOME | End: 2019-03-13
Attending: FAMILY MEDICINE
Payer: COMMERCIAL

## 2019-03-13 DIAGNOSIS — N94.89 ADNEXAL MASS: ICD-10-CM

## 2019-03-13 PROCEDURE — 76856 US PELVIS COMP WITH TRANSVAG NON-OB (XPD): ICD-10-PCS | Mod: 26,,, | Performed by: RADIOLOGY

## 2019-03-13 PROCEDURE — 76856 US EXAM PELVIC COMPLETE: CPT | Mod: 26,,, | Performed by: RADIOLOGY

## 2019-03-13 PROCEDURE — 76830 US PELVIS COMP WITH TRANSVAG NON-OB (XPD): ICD-10-PCS | Mod: 26,,, | Performed by: RADIOLOGY

## 2019-03-13 PROCEDURE — 76830 TRANSVAGINAL US NON-OB: CPT | Mod: 26,,, | Performed by: RADIOLOGY

## 2019-03-13 PROCEDURE — 76830 TRANSVAGINAL US NON-OB: CPT | Mod: TC

## 2019-03-14 ENCOUNTER — TELEPHONE (OUTPATIENT)
Dept: FAMILY MEDICINE | Facility: CLINIC | Age: 24
End: 2019-03-14

## 2019-03-14 ENCOUNTER — PATIENT MESSAGE (OUTPATIENT)
Dept: FAMILY MEDICINE | Facility: CLINIC | Age: 24
End: 2019-03-14

## 2019-03-14 ENCOUNTER — OFFICE VISIT (OUTPATIENT)
Dept: URGENT CARE | Facility: CLINIC | Age: 24
End: 2019-03-14
Payer: COMMERCIAL

## 2019-03-14 VITALS
OXYGEN SATURATION: 97 % | HEIGHT: 65 IN | TEMPERATURE: 98 F | RESPIRATION RATE: 19 BRPM | BODY MASS INDEX: 47.32 KG/M2 | HEART RATE: 97 BPM | WEIGHT: 284 LBS

## 2019-03-14 DIAGNOSIS — R10.30 LOWER ABDOMINAL PAIN: Primary | ICD-10-CM

## 2019-03-14 DIAGNOSIS — R10.2 PELVIC PAIN: Primary | ICD-10-CM

## 2019-03-14 LAB — BACTERIA UR CULT: NORMAL

## 2019-03-14 PROCEDURE — 99214 PR OFFICE/OUTPT VISIT, EST, LEVL IV, 30-39 MIN: ICD-10-PCS | Mod: S$GLB,,, | Performed by: INTERNAL MEDICINE

## 2019-03-14 PROCEDURE — 3008F BODY MASS INDEX DOCD: CPT | Mod: CPTII,S$GLB,, | Performed by: INTERNAL MEDICINE

## 2019-03-14 PROCEDURE — 3008F PR BODY MASS INDEX (BMI) DOCUMENTED: ICD-10-PCS | Mod: CPTII,S$GLB,, | Performed by: INTERNAL MEDICINE

## 2019-03-14 PROCEDURE — 99214 OFFICE O/P EST MOD 30 MIN: CPT | Mod: S$GLB,,, | Performed by: INTERNAL MEDICINE

## 2019-03-14 RX ORDER — NAPROXEN 500 MG/1
500 TABLET ORAL 2 TIMES DAILY WITH MEALS
Qty: 20 TABLET | Refills: 0 | Status: SHIPPED | OUTPATIENT
Start: 2019-03-14 | End: 2019-03-24

## 2019-03-14 NOTE — TELEPHONE ENCOUNTER
She is maintaining fluid in her pelvis and I am not sure why this persists. I am going to have her see an OBGYN

## 2019-03-14 NOTE — TELEPHONE ENCOUNTER
----- Message from Indy Solis sent at 3/14/2019 10:30 AM CDT -----  Contact: Pt   Name of Who is Calling: ELEUTERIO MOMIN [[id number]]     What is the request in detail: Pt called to check the status of her ultrasound results. Please call to further discuss and advise.     Can the clinic reply by [facility]: No     [facility]: [phone]

## 2019-03-14 NOTE — TELEPHONE ENCOUNTER
Please Advise  Patient called requesting results    EXAMINATION:  US PELVIS COMP WITH TRANSVAG NON-OB (XPD)     CLINICAL HISTORY:  Unspecified condition associated with female genital organs and menstrual   cycle     TECHNIQUE:  Transabdominal sonography of the pelvis was performed, followed by   transvaginal sonography to better evaluate the uterus and ovaries.     COMPARISON:  CT 02/19/2019     FINDINGS:  Uterus:     Size: 7.7 x 3.6 x 5 cm     Masses: None     Endometrium: Normal in this pre menopausal patient, measuring 12 mm.     Right ovary:     Size: 1.8 x 2.6 x 1.6 cm     Appearance: Normal     Vascular flow: Normal.     Left ovary:     Size: 2.9 x 2 x 1.6 cm     Appearance: Normal     Vascular Flow: Normal.     Free Fluid:     There is a small amount of free fluid in the pelvis.     IMPRESSION:      Small amount of free fluid noted in the pelvis.

## 2019-03-14 NOTE — PROGRESS NOTES
"Subjective:       Patient ID: Frankie Garcia is a 23 y.o. female.    Vitals:  height is 5' 5" (1.651 m) and weight is 128.8 kg (284 lb). Her oral temperature is 97.9 °F (36.6 °C). Her pulse is 97. Her respiration is 19 and oxygen saturation is 97%.     Chief Complaint: Abdominal Pain    Patient diagnosed with kidney infection one month ago and has been experiencing lower abdominal pain for the last 4 days.      Abdominal Pain   This is a new problem. The current episode started in the past 7 days (4 days). The onset quality is gradual. The problem occurs constantly. The problem has been gradually worsening. The pain is at a severity of 8/10. The pain is moderate. The quality of the pain is cramping. The abdominal pain radiates to the back. Pertinent negatives include no anorexia, arthralgias, belching, constipation, diarrhea, dysuria, fever, flatus, frequency, headaches, hematochezia, hematuria, melena, myalgias, nausea, vomiting or weight loss. Associated symptoms comments: 99.7 temperature today. Nothing aggravates the pain. The pain is relieved by nothing. Treatments tried: Tylenol. The treatment provided mild (fever) relief.       Constitution: Negative for chills, fatigue and fever.   Neck: Negative for painful lymph nodes.   Cardiovascular: Negative for leg swelling.   Eyes: Negative for double vision and blurred vision.   Respiratory: Negative for shortness of breath.    Gastrointestinal: Positive for abdominal pain. Negative for nausea, vomiting, constipation, diarrhea and bright red blood in stool.   Genitourinary: Negative for dysuria, frequency, urgency, hematuria and history of kidney stones.   Musculoskeletal: Negative for joint pain, joint swelling, muscle cramps and muscle ache.   Skin: Negative for color change, pale and bruising.   Allergic/Immunologic: Negative for seasonal allergies.   Neurological: Negative for dizziness, history of vertigo, light-headedness, passing out and headaches. "   Hematologic/Lymphatic: Negative for swollen lymph nodes.   Psychiatric/Behavioral: Negative for nervous/anxious, sleep disturbance and depression. The patient is not nervous/anxious.        Objective:      Physical Exam   Constitutional: She appears well-developed and well-nourished.   HENT:   Head: Normocephalic and atraumatic.   Eyes: Conjunctivae and EOM are normal. Pupils are equal, round, and reactive to light.   Neck: Normal range of motion. Neck supple.   Cardiovascular: Normal rate and regular rhythm.   Pulmonary/Chest: Effort normal and breath sounds normal.   Abdominal:   Mild discomfort to palpation both lower quadrants; no guarding or rebound; no masses or organomegaly noted   Nursing note and vitals reviewed.      Assessment:       1. Lower abdominal pain        Plan:         Lower abdominal pain  -     Ambulatory referral to Obstetrics / Gynecology  -     naproxen (NAPROSYN) 500 MG tablet; Take 1 tablet (500 mg total) by mouth 2 (two) times daily with meals. for 10 days  Dispense: 20 tablet; Refill: 0

## 2019-03-15 ENCOUNTER — OFFICE VISIT (OUTPATIENT)
Dept: OBSTETRICS AND GYNECOLOGY | Facility: CLINIC | Age: 24
End: 2019-03-15
Payer: COMMERCIAL

## 2019-03-15 VITALS
DIASTOLIC BLOOD PRESSURE: 68 MMHG | WEIGHT: 285.5 LBS | BODY MASS INDEX: 47.57 KG/M2 | SYSTOLIC BLOOD PRESSURE: 102 MMHG | HEIGHT: 65 IN | TEMPERATURE: 98 F

## 2019-03-15 DIAGNOSIS — R10.2 PELVIC PAIN: ICD-10-CM

## 2019-03-15 DIAGNOSIS — D50.0 IRON DEFICIENCY ANEMIA DUE TO CHRONIC BLOOD LOSS: ICD-10-CM

## 2019-03-15 DIAGNOSIS — Z12.4 PAP SMEAR FOR CERVICAL CANCER SCREENING: ICD-10-CM

## 2019-03-15 DIAGNOSIS — R18.8 FLUID IN PERITONEAL CAVITY: Primary | ICD-10-CM

## 2019-03-15 PROCEDURE — 99213 OFFICE O/P EST LOW 20 MIN: CPT | Mod: S$GLB,,, | Performed by: OBSTETRICS & GYNECOLOGY

## 2019-03-15 PROCEDURE — 88142 CYTOPATH C/V THIN LAYER: CPT

## 2019-03-15 PROCEDURE — 99999 PR PBB SHADOW E&M-EST. PATIENT-LVL III: ICD-10-PCS | Mod: PBBFAC,,, | Performed by: OBSTETRICS & GYNECOLOGY

## 2019-03-15 PROCEDURE — 3008F PR BODY MASS INDEX (BMI) DOCUMENTED: ICD-10-PCS | Mod: CPTII,S$GLB,, | Performed by: OBSTETRICS & GYNECOLOGY

## 2019-03-15 PROCEDURE — 99999 PR PBB SHADOW E&M-EST. PATIENT-LVL III: CPT | Mod: PBBFAC,,, | Performed by: OBSTETRICS & GYNECOLOGY

## 2019-03-15 PROCEDURE — 3008F BODY MASS INDEX DOCD: CPT | Mod: CPTII,S$GLB,, | Performed by: OBSTETRICS & GYNECOLOGY

## 2019-03-15 PROCEDURE — 99213 PR OFFICE/OUTPT VISIT, EST, LEVL III, 20-29 MIN: ICD-10-PCS | Mod: S$GLB,,, | Performed by: OBSTETRICS & GYNECOLOGY

## 2019-03-15 RX ORDER — DESOGESTREL AND ETHINYL ESTRADIOL 21-5 (28)
1 KIT ORAL DAILY
Qty: 30 TABLET | Refills: 11 | Status: SHIPPED | OUTPATIENT
Start: 2019-03-15 | End: 2019-08-20

## 2019-03-15 NOTE — PROGRESS NOTES
CC: Pelvic pain and free fluid in pelvis    HPI:  Frankie Garcia is a 23 y.o. female patient  who presents today with pelvic pain and free fluid in the pelvis on recent sonogram.  Patient was instructed by PCP/urgent care to see a Gynecologist ASAP.  Patient reports regular menses  (occurring monthly).  Patient denies clots.  Intermittent severe cramping prior to and during menses.    Patient's last menstrual period was 2019.     + sexually active (negative GC/Chlamydia in 2019)    Chart reviewed from hospitalization (acute pyelonephritis/sepsis).    Past Medical History:   Diagnosis Date    Cigarette smoker     1ppd    Influenza 2019    Pyelonephritis 2019       Past Surgical History:   Procedure Laterality Date    NO PAST SURGERIES  2019     ROS:  GENERAL: Feeling well overall.   SKIN: Denies rash or lesions.   HEAD: Denies head injury or headache.   NODES: Denies enlarged lymph nodes.   CHEST: Denies chest pain or shortness of breath.   CARDIOVASCULAR: Denies palpitations or left sided chest pain.   ABDOMEN: No abdominal pain, nausea, vomiting or rectal bleeding.   URINARY: No dysuria, hematuria, or burning on urination.  REPRODUCTIVE: See HPI.   BREASTS: Denies pain, lumps, or nipple discharge.   HEMATOLOGIC: No easy bruisability or excessive bleeding.   MUSCULOSKELETAL: Denies joint pain or swelling.   NEUROLOGIC: Denies syncope or weakness.   PSYCHIATRIC: Denies depression.    PE:   APPEARANCE: Well nourished, well developed, in no acute distress.  ABDOMEN: Soft. No tenderness or masses. No hernias. No CVA tenderness.  VULVA: No lesions. Normal female genitalia.  URETHRAL MEATUS: Normal size and location, no lesions, no prolapse.  URETHRA: No masses, tenderness, prolapse or scarring.  VAGINA: Moist and well rugated, no discharge, no significant cystocele or rectocele.  CERVIX: No lesions and discharge. No cervical motion tenderness.  UTERUS: Normal size, regular shape, mobile,  non-tender, bladder base nontender.  ADNEXA: No masses, tenderness or CDS nodularity.  ANUS PERINEUM: Normal.    Diagnosis:  1. Fluid in peritoneal cavity    2. Pelvic pain    3. Iron deficiency anemia due to chronic blood loss    4. Pap smear for cervical cancer screening      PLAN:    Orders Placed This Encounter    US Pelvis Comp with Transvag NON-OB (xpd    Liquid-based pap smear, screening    desog-e.estradiol/e.estradiol (KARIVA) 0.15-0.02 mgx21 /0.01 mg x 5 per tablet       Patient was counseled today on recent sonogram findings and pelvic exam findings (beniogn)    Recommend:  OCP initiation and fe to reverse anemia and help with dysmenorrhea.    Discussed endometriosis - symptoms and hormonal management.  No formal diagnosis but if etiology of free fluid, symptoms should improve on ocp regimen.    Repeat sonogram in 3-6 months    Baseline initial Papsmear (screening) done    Follow-up:  PRN/Annual examine 1 year    TVUS in 6 months     Matthew Stout IV, MD  Answers for HPI/ROS submitted by the patient on 3/14/2019   Pelvic pain  Chronicity: recurrent  Onset: 1 to 4 weeks ago  Frequency: constantly  Progression since onset: gradually worsening  Pain severity: moderate  Affected side: left  Pregnant now?: No  abdominal pain: Yes  anorexia: Yes  chills: No  constipation: Yes  discolored urine: No  dysuria: No  flank pain: Yes  frequency: Yes  hematuria: No  nausea: Yes  rash: Yes  urgency: No  treatments tried: acetaminophen, antibiotics, NSAIDs  Improvement on treatment: moderate  Sexual activity: sexually active  Partner with STD symptoms: no  Birth control: condoms  Menstrual history: regular  STD: No  abdominal surgery: No   section: No  Ectopic pregnancy: No  Endometriosis: No  herpes simplex: No  gynecological surgery: No  menorrhagia: No  metrorrhagia: No  miscarriage: No  ovarian cysts: Yes  perineal abscess: No  PID: No  terminated pregnancy: No  vaginosis: No

## 2019-03-18 ENCOUNTER — PATIENT MESSAGE (OUTPATIENT)
Dept: OBSTETRICS AND GYNECOLOGY | Facility: CLINIC | Age: 24
End: 2019-03-18

## 2019-05-01 ENCOUNTER — HOSPITAL ENCOUNTER (OUTPATIENT)
Dept: RADIOLOGY | Facility: OTHER | Age: 24
Discharge: HOME | End: 2019-05-01
Attending: OBSTETRICS & GYNECOLOGY
Payer: COMMERCIAL

## 2019-05-01 DIAGNOSIS — R10.2 PELVIC PAIN: ICD-10-CM

## 2019-05-01 DIAGNOSIS — R18.8 FLUID IN PERITONEAL CAVITY: ICD-10-CM

## 2019-05-01 PROCEDURE — 76830 US PELVIS COMP WITH TRANSVAG NON-OB (XPD): ICD-10-PCS | Mod: 26,,, | Performed by: RADIOLOGY

## 2019-05-01 PROCEDURE — 76830 TRANSVAGINAL US NON-OB: CPT | Mod: TC

## 2019-05-01 PROCEDURE — 76830 TRANSVAGINAL US NON-OB: CPT | Mod: 26,,, | Performed by: RADIOLOGY

## 2019-05-01 PROCEDURE — 76856 US EXAM PELVIC COMPLETE: CPT | Mod: 26,,, | Performed by: RADIOLOGY

## 2019-05-01 PROCEDURE — 76856 US PELVIS COMP WITH TRANSVAG NON-OB (XPD): ICD-10-PCS | Mod: 26,,, | Performed by: RADIOLOGY

## 2019-05-02 ENCOUNTER — PATIENT MESSAGE (OUTPATIENT)
Dept: OBSTETRICS AND GYNECOLOGY | Facility: CLINIC | Age: 24
End: 2019-05-02

## 2019-05-07 ENCOUNTER — PATIENT MESSAGE (OUTPATIENT)
Dept: OBSTETRICS AND GYNECOLOGY | Facility: CLINIC | Age: 24
End: 2019-05-07

## 2019-05-09 ENCOUNTER — HOSPITAL ENCOUNTER (EMERGENCY)
Facility: HOSPITAL | Age: 24
Discharge: HOME | End: 2019-05-10
Attending: EMERGENCY MEDICINE
Payer: COMMERCIAL

## 2019-05-09 DIAGNOSIS — R10.9 ACUTE LEFT FLANK PAIN: Primary | ICD-10-CM

## 2019-05-09 LAB
B-HCG UR QL: NEGATIVE
BILIRUBIN, POC UA: NEGATIVE
BLOOD, POC UA: ABNORMAL
CLARITY, POC UA: CLEAR
COLOR, POC UA: YELLOW
CTP QC/QA: YES
GLUCOSE, POC UA: NEGATIVE
KETONES, POC UA: NEGATIVE
LEUKOCYTE EST, POC UA: NEGATIVE
NITRITE, POC UA: NEGATIVE
PH UR STRIP: 6 [PH]
PROTEIN, POC UA: ABNORMAL
SPECIFIC GRAVITY, POC UA: >=1.03
UROBILINOGEN, POC UA: 0.2 E.U./DL

## 2019-05-09 PROCEDURE — 96374 THER/PROPH/DIAG INJ IV PUSH: CPT | Mod: ER

## 2019-05-09 PROCEDURE — 96361 HYDRATE IV INFUSION ADD-ON: CPT | Mod: ER

## 2019-05-09 PROCEDURE — 99284 EMERGENCY DEPT VISIT MOD MDM: CPT | Mod: 25,ER

## 2019-05-09 PROCEDURE — 81003 URINALYSIS AUTO W/O SCOPE: CPT | Mod: ER

## 2019-05-09 PROCEDURE — 80053 COMPREHEN METABOLIC PANEL: CPT | Mod: ER

## 2019-05-09 PROCEDURE — 25000003 PHARM REV CODE 250: Mod: ER | Performed by: EMERGENCY MEDICINE

## 2019-05-09 PROCEDURE — 81025 URINE PREGNANCY TEST: CPT | Mod: ER | Performed by: EMERGENCY MEDICINE

## 2019-05-09 PROCEDURE — 85025 COMPLETE CBC W/AUTO DIFF WBC: CPT | Mod: ER

## 2019-05-09 RX ORDER — KETOROLAC TROMETHAMINE 30 MG/ML
30 INJECTION, SOLUTION INTRAMUSCULAR; INTRAVENOUS
Status: COMPLETED | OUTPATIENT
Start: 2019-05-10 | End: 2019-05-09

## 2019-05-09 RX ADMIN — SODIUM CHLORIDE 1000 ML: 0.9 INJECTION, SOLUTION INTRAVENOUS at 11:05

## 2019-05-09 RX ADMIN — KETOROLAC TROMETHAMINE 30 MG: 30 INJECTION, SOLUTION INTRAMUSCULAR at 11:05

## 2019-05-10 VITALS
HEART RATE: 78 BPM | SYSTOLIC BLOOD PRESSURE: 112 MMHG | HEIGHT: 65 IN | RESPIRATION RATE: 18 BRPM | BODY MASS INDEX: 48.79 KG/M2 | DIASTOLIC BLOOD PRESSURE: 68 MMHG | TEMPERATURE: 98 F | OXYGEN SATURATION: 98 % | WEIGHT: 292.81 LBS

## 2019-05-10 LAB
ALBUMIN SERPL-MCNC: 4.1 G/DL (ref 3.3–5.5)
ALP SERPL-CCNC: 81 U/L (ref 42–141)
BILIRUB SERPL-MCNC: 0.7 MG/DL (ref 0.2–1.6)
BUN SERPL-MCNC: 18 MG/DL (ref 7–22)
CALCIUM SERPL-MCNC: 9.7 MG/DL (ref 8–10.3)
CHLORIDE SERPL-SCNC: 105 MMOL/L (ref 98–108)
CREAT SERPL-MCNC: 1.1 MG/DL (ref 0.6–1.2)
GLUCOSE SERPL-MCNC: 90 MG/DL (ref 73–118)
POC ALT (SGPT): 26 U/L (ref 10–47)
POC AST (SGOT): 28 U/L (ref 11–38)
POC TCO2: 28 MMOL/L (ref 18–33)
POTASSIUM BLD-SCNC: 3.9 MMOL/L (ref 3.6–5.1)
PROTEIN, POC: 7.1 G/DL (ref 6.4–8.1)
SODIUM BLD-SCNC: 142 MMOL/L (ref 128–145)

## 2019-05-10 PROCEDURE — 63600175 PHARM REV CODE 636 W HCPCS: Mod: ER | Performed by: EMERGENCY MEDICINE

## 2019-05-10 PROCEDURE — 96374 THER/PROPH/DIAG INJ IV PUSH: CPT | Mod: ER

## 2019-05-10 RX ORDER — IBUPROFEN 800 MG/1
800 TABLET ORAL EVERY 6 HOURS PRN
Qty: 20 TABLET | Refills: 0 | Status: SHIPPED | OUTPATIENT
Start: 2019-05-10 | End: 2019-08-20

## 2019-05-10 NOTE — ED PROVIDER NOTES
Encounter Date: 2019    SCRIBE #1 NOTE: I, Jam Thomas, am scribing for, and in the presence of, Dr. Henley.       History     Chief Complaint   Patient presents with    Flank Pain     left sided back and flank pain since yesterday. Pt was recently hospitalized for sepsis r/t UTI. Pt states she has been confused and fatigued like she was before. Pt has been afebrile and denies dysuria.      This is a 24 y.o. female who presents to the ED complaining of acute, intermittent left flank pain and lower back pain for 2 days. She relays lower abdominal cramping for 1 week.  Confirms loss of appetite and mild cough. Denies hematuria, vaginal discharge, and constipation. Her last bowel movement was today and it was normal. Her last menstrual cycle was a few days ago. Patient was recently hospitalized due to sepsis secondary to UTI. Reports similar symptoms when she was hospitalized for sepsis.     The history is provided by the patient and a parent. No  was used.     Review of patient's allergies indicates:   Allergen Reactions    Bactrim [sulfamethoxazole-trimethoprim] Hives    Ciprofloxacin Hives     Past Medical History:   Diagnosis Date    Cigarette smoker     1ppd    Influenza 2019    Pyelonephritis 2019    Sepsis      Past Surgical History:   Procedure Laterality Date    NO PAST SURGERIES  2019     Family History   Problem Relation Age of Onset    Arthritis Father     Diabetes Maternal Aunt     Breast cancer Neg Hx     Colon cancer Neg Hx     Ovarian cancer Neg Hx      Social History     Tobacco Use    Smoking status: Current Every Day Smoker     Packs/day: 0.07     Years: 1.00     Pack years: 0.07     Last attempt to quit: 2019     Years since quittin.3    Smokeless tobacco: Never Used   Substance Use Topics    Alcohol use: Yes     Alcohol/week: 0.0 oz     Comment: socially, monthly     Drug use: No     Review of Systems   Constitutional: Positive for  appetite change (decreased). Negative for fever.   Respiratory: Positive for cough. Negative for shortness of breath.    Gastrointestinal: Positive for abdominal pain (lower). Negative for constipation, diarrhea, nausea and vomiting.   Genitourinary: Positive for flank pain. Negative for dysuria, frequency, hematuria and vaginal discharge.   Musculoskeletal: Negative for back pain.   All other systems reviewed and are negative.      Physical Exam     Initial Vitals [05/09/19 2132]   BP Pulse Resp Temp SpO2   129/75 94 18 98.4 °F (36.9 °C) 99 %      MAP       --         Physical Exam    Nursing note and vitals reviewed.  Constitutional: She appears well-developed and well-nourished.   HENT:   Head: Normocephalic and atraumatic.   Eyes: Conjunctivae are normal.   Neck: Normal range of motion and phonation normal. Neck supple.   Cardiovascular: Normal rate and intact distal pulses.   Pulmonary/Chest: Effort normal. No stridor. No respiratory distress.   Abdominal: Soft. There is no tenderness. There is no rebound, no guarding and no CVA tenderness.   Musculoskeletal: Normal range of motion.   Neurological: She is alert and oriented to person, place, and time.   Skin: Skin is warm and dry. Capillary refill takes less than 2 seconds. No rash noted.   Psychiatric: She has a normal mood and affect. Her behavior is normal.         ED Course   Procedures  Labs Reviewed   POCT URINALYSIS W/O SCOPE - Abnormal; Notable for the following components:       Result Value    Glucose, UA Negative (*)     Bilirubin, UA Negative (*)     Ketones, UA Negative (*)     Spec Grav UA >=1.030 (*)     Blood, UA Trace-intact (*)     Protein, UA Trace (*)     Nitrite, UA Negative (*)     Leukocytes, UA Negative (*)     All other components within normal limits   POCT URINE PREGNANCY   POCT URINALYSIS W/O SCOPE   POCT CBC   POCT CMP   POCT CMP          Imaging Results          CT Renal Stone Study ABD Pelvis WO (Final result)  Result time  05/09/19 23:32:36    Final result by Janina Rivas MD (05/09/19 23:32:36)                 Impression:      Normal.  Normal appendix.  No urolithiasis or acute inflammation or hydronephrosis.      Electronically signed by: Janina Rivas  Date:    05/09/2019  Time:    23:32             Narrative:    EXAMINATION:  CT RENAL STONE STUDY ABD PELVIS WO    CLINICAL HISTORY:  Flank pain, stone disease suspected;    TECHNIQUE:  Low dose axial images, sagittal and coronal reformations were obtained from the lung bases to the pubic symphysis.  Contrast was not administered.    COMPARISON:  02/18/2019 and 02/19/2019 CT    FINDINGS:  Chest: The visualized heart appears normal.  No pericardial effusion.  Visualized soft is appears normal.  The visualized lung bases appear normal.  No pneumothorax or pleural effusion.    Abdomen: The visualized liver, spleen, pancreas, adrenal glands appear normal.  The abdominal aorta and IVC appear normal.  No adenopathy found.  Gallbladder is contracted and appears normal.  No duct dilation.  Kidneys show normal morphology.  No perinephric stranding or hydronephrosis.  Ureters appear normal.  No urolithiasis.    Pelvis: The urinary bladder and uterus and adnexal regions are grossly normal.  No inguinal hernia or pelvic adenopathy.    Bowel: The terminal ileum appears normal.  The appendix appears normal axial image 60.  No bowel dilation or wall thickening or pneumatosis or free air or abscess or free fluid.  No acute inflammation.    Musculoskeletal: The visualized femurs appear intact without osteonecrosis.  The sacrum and SI joints appear normal.  Ribs appear intact.  No compression deformity or subluxation of the visualized spine.  No endplate erosion.  Compression                                 Medical Decision Making:   History:   Old Medical Records: I decided to obtain old medical records.  Clinical Tests:   Lab Tests: Ordered and Reviewed       <> Summary of Lab: WBC 7.8  H&H 12.3,  35.0  MCV 80.4  RDW% 13.0                  Scribe Attestation:   Scribe #1: I performed the above scribed service and the documentation accurately describes the services I performed. I attest to the accuracy of the note.      Labs Reviewed  Admission on 05/09/2019, Discharged on 05/10/2019   Component Date Value Ref Range Status    POC Preg Test, Ur 05/09/2019 Negative  Negative Final     Acceptable 05/09/2019 Yes   Final    Glucose, UA 05/09/2019 Negative*  Final    Bilirubin, UA 05/09/2019 Negative*  Final    Ketones, UA 05/09/2019 Negative*  Final    Spec Grav UA 05/09/2019 >=1.030*  Final    Blood, UA 05/09/2019 Trace-intact*  Final    PH, UA 05/09/2019 6.0   Final    Protein, UA 05/09/2019 Trace*  Final    Urobilinogen, UA 05/09/2019 0.2  E.U./dL Final    Nitrite, UA 05/09/2019 Negative*  Final    Leukocytes, UA 05/09/2019 Negative*  Final    Color, UA 05/09/2019 Yellow   Final    Clarity, UA 05/09/2019 Clear   Final    Albumin, POC 05/09/2019 4.1  3.3 - 5.5 g/dL Final    Alkaline Phosphatase, POC 05/09/2019 81  42 - 141 U/L Final    ALT (SGPT), POC 05/09/2019 26  10 - 47 U/L Final    AST (SGOT), POC 05/09/2019 28  11 - 38 U/L Final    POC BUN 05/09/2019 18  7 - 22 mg/dL Final    Calcium, POC 05/09/2019 9.7  8 - 10.3 mg/dL Final    POC Chloride 05/09/2019 105  98 - 108 mmol/L Final    POC Creatinine 05/09/2019 1.1  0.6 - 1.2 mg/dL Final    POC Glucose 05/09/2019 90  73 - 118 mg/dL Final    POC Potassium 05/09/2019 3.9  3.6 - 5.1 mmol/L Final    POC Sodium 05/09/2019 142  128 - 145 mmol/L Final    Bilirubin 05/09/2019 0.7  0.2 - 1.6 mg/dL Final    POC TCO2 05/09/2019 28  18 - 33 mmol/L Final    Protein 05/09/2019 7.1  6.4 - 8.1 g/dL Final        Imaging Reviewed    Imaging Results          CT Renal Stone Study ABD Pelvis WO (Final result)  Result time 05/09/19 23:32:36    Final result by Janina Rivas MD (05/09/19 23:32:36)                 Impression:       Normal.  Normal appendix.  No urolithiasis or acute inflammation or hydronephrosis.      Electronically signed by: Janinatequila Rivas  Date:    05/09/2019  Time:    23:32             Narrative:    EXAMINATION:  CT RENAL STONE STUDY ABD PELVIS WO    CLINICAL HISTORY:  Flank pain, stone disease suspected;    TECHNIQUE:  Low dose axial images, sagittal and coronal reformations were obtained from the lung bases to the pubic symphysis.  Contrast was not administered.    COMPARISON:  02/18/2019 and 02/19/2019 CT    FINDINGS:  Chest: The visualized heart appears normal.  No pericardial effusion.  Visualized soft is appears normal.  The visualized lung bases appear normal.  No pneumothorax or pleural effusion.    Abdomen: The visualized liver, spleen, pancreas, adrenal glands appear normal.  The abdominal aorta and IVC appear normal.  No adenopathy found.  Gallbladder is contracted and appears normal.  No duct dilation.  Kidneys show normal morphology.  No perinephric stranding or hydronephrosis.  Ureters appear normal.  No urolithiasis.    Pelvis: The urinary bladder and uterus and adnexal regions are grossly normal.  No inguinal hernia or pelvic adenopathy.    Bowel: The terminal ileum appears normal.  The appendix appears normal axial image 60.  No bowel dilation or wall thickening or pneumatosis or free air or abscess or free fluid.  No acute inflammation.    Musculoskeletal: The visualized femurs appear intact without osteonecrosis.  The sacrum and SI joints appear normal.  Ribs appear intact.  No compression deformity or subluxation of the visualized spine.  No endplate erosion.  Compression                                Medications given in ED    Medications   sodium chloride 0.9% bolus 1,000 mL (0 mLs Intravenous Stopped 5/10/19 0045)   ketorolac injection 30 mg (30 mg Intravenous Given 5/9/19 8177)       This document was produced by a scribe under my direction and in my presence. I agree with the content of the note  and have made any necessary edits.     Shanna Henley MD         Note was created using voice recognition software. Note may have occasional typographical errors that may not have been identified and edited despite good reyes initial review prior to signing.    Discharge Medications     Discharge Medication List as of 5/10/2019 12:25 AM      START taking these medications    Details   ibuprofen (ADVIL,MOTRIN) 800 MG tablet Take 1 tablet (800 mg total) by mouth every 6 (six) hours as needed for Pain., Starting Fri 5/10/2019, Print         CONTINUE these medications which have NOT CHANGED    Details   acetaminophen (TYLENOL) 325 MG tablet Take 2 tablets (650 mg total) by mouth every 4 (four) hours as needed for Pain or Temperature greater than (100)., Starting Fri 2/22/2019, OTC      ALBUTEROL INHL Inhale 2 puffs into the lungs every 4 to 6 hours as needed., Historical Med      desog-e.estradiol/e.estradiol (KARIVA) 0.15-0.02 mgx21 /0.01 mg x 5 per tablet Take 1 tablet by mouth once daily., Starting Fri 3/15/2019, Until Sat 3/14/2020, Normal      ferrous sulfate 325 (65 FE) MG EC tablet Take 1 tablet (325 mg total) by mouth once daily., Starting Fri 2/22/2019, OTC      ondansetron (ZOFRAN) 4 MG tablet Take 4 mg by mouth every 6 (six) hours as needed., Starting Wed 2/6/2019, Historical Med                   Patient discharged to home in stable condition with instructions to:   1. Please take all meds as prescribed.  2. Follow-up with your primary care doctor   3. Return precautions discussed and patient and/or family/caretaker understands to return to the emergency room for any concerns including worsening of your current symptoms, fever, chills, night sweats, worsening pain, chest pain, shortness of breath, nausea, vomiting, diarrhea, bleeding, headache, difficulty talking, visual disturbances, weakness, numbness or any other acute concerns               Clinical Impression:       ICD-10-CM ICD-9-CM   1. Acute left  flank pain R10.9 789.09     338.19         Disposition:   Disposition: Discharged  Condition: Stable                        Shanna Henley MD  05/13/19 0624

## 2019-05-15 ENCOUNTER — TELEPHONE (OUTPATIENT)
Dept: OBSTETRICS AND GYNECOLOGY | Facility: CLINIC | Age: 24
End: 2019-05-15

## 2019-05-15 NOTE — TELEPHONE ENCOUNTER
----- Message from Matthew Stout IV, MD sent at 5/2/2019 12:16 PM CDT -----  Benign pelvic sonogram.  Contact patient

## 2019-05-16 ENCOUNTER — HOSPITAL ENCOUNTER (EMERGENCY)
Facility: HOSPITAL | Age: 24
Discharge: HOME | End: 2019-05-16
Attending: EMERGENCY MEDICINE
Payer: COMMERCIAL

## 2019-05-16 VITALS
DIASTOLIC BLOOD PRESSURE: 60 MMHG | TEMPERATURE: 98 F | BODY MASS INDEX: 45.82 KG/M2 | WEIGHT: 275 LBS | OXYGEN SATURATION: 96 % | SYSTOLIC BLOOD PRESSURE: 112 MMHG | HEART RATE: 74 BPM | RESPIRATION RATE: 18 BRPM | HEIGHT: 65 IN

## 2019-05-16 DIAGNOSIS — R10.9 RIGHT FLANK PAIN: Primary | ICD-10-CM

## 2019-05-16 LAB
ALBUMIN SERPL BCP-MCNC: 4.2 G/DL (ref 3.5–5.2)
ALP SERPL-CCNC: 85 U/L (ref 55–135)
ALT SERPL W/O P-5'-P-CCNC: 28 U/L (ref 10–44)
ANION GAP SERPL CALC-SCNC: 10 MMOL/L (ref 8–16)
AST SERPL-CCNC: 21 U/L (ref 10–40)
B-HCG UR QL: NEGATIVE
BACTERIA #/AREA URNS HPF: ABNORMAL /HPF
BASOPHILS # BLD AUTO: 0.02 K/UL (ref 0–0.2)
BASOPHILS NFR BLD: 0.2 % (ref 0–1.9)
BILIRUB SERPL-MCNC: 0.6 MG/DL (ref 0.1–1)
BILIRUB UR QL STRIP: NEGATIVE
BUN SERPL-MCNC: 21 MG/DL (ref 6–20)
CALCIUM SERPL-MCNC: 10.2 MG/DL (ref 8.7–10.5)
CAOX CRY URNS QL MICRO: ABNORMAL
CHLORIDE SERPL-SCNC: 103 MMOL/L (ref 95–110)
CLARITY UR: ABNORMAL
CO2 SERPL-SCNC: 23 MMOL/L (ref 23–29)
COLOR UR: YELLOW
CREAT SERPL-MCNC: 0.9 MG/DL (ref 0.5–1.4)
CTP QC/QA: YES
DIFFERENTIAL METHOD: NORMAL
EOSINOPHIL # BLD AUTO: 0.1 K/UL (ref 0–0.5)
EOSINOPHIL NFR BLD: 1.4 % (ref 0–8)
ERYTHROCYTE [DISTWIDTH] IN BLOOD BY AUTOMATED COUNT: 13.7 % (ref 11.5–14.5)
EST. GFR  (AFRICAN AMERICAN): >60 ML/MIN/1.73 M^2
EST. GFR  (NON AFRICAN AMERICAN): >60 ML/MIN/1.73 M^2
GLUCOSE SERPL-MCNC: 96 MG/DL (ref 70–110)
GLUCOSE UR QL STRIP: NEGATIVE
HCT VFR BLD AUTO: 37.1 % (ref 37–48.5)
HGB BLD-MCNC: 12.3 G/DL (ref 12–16)
HGB UR QL STRIP: ABNORMAL
KETONES UR QL STRIP: ABNORMAL
LACTATE SERPL-SCNC: 0.9 MMOL/L (ref 0.5–2.2)
LEUKOCYTE ESTERASE UR QL STRIP: ABNORMAL
LYMPHOCYTES # BLD AUTO: 2 K/UL (ref 1–4.8)
LYMPHOCYTES NFR BLD: 24.3 % (ref 18–48)
MCH RBC QN AUTO: 28 PG (ref 27–31)
MCHC RBC AUTO-ENTMCNC: 33.2 G/DL (ref 32–36)
MCV RBC AUTO: 84 FL (ref 82–98)
MICROSCOPIC COMMENT: ABNORMAL
MONOCYTES # BLD AUTO: 0.6 K/UL (ref 0.3–1)
MONOCYTES NFR BLD: 7.5 % (ref 4–15)
NEUTROPHILS # BLD AUTO: 5.3 K/UL (ref 1.8–7.7)
NEUTROPHILS NFR BLD: 66.5 % (ref 38–73)
NITRITE UR QL STRIP: NEGATIVE
PH UR STRIP: 5 [PH] (ref 5–8)
PLATELET # BLD AUTO: 247 K/UL (ref 150–350)
PMV BLD AUTO: 9.3 FL (ref 9.2–12.9)
POTASSIUM SERPL-SCNC: 3.3 MMOL/L (ref 3.5–5.1)
PROT SERPL-MCNC: 7.5 G/DL (ref 6–8.4)
PROT UR QL STRIP: NEGATIVE
RBC # BLD AUTO: 4.4 M/UL (ref 4–5.4)
RBC #/AREA URNS HPF: 3 /HPF (ref 0–4)
SODIUM SERPL-SCNC: 136 MMOL/L (ref 136–145)
SP GR UR STRIP: 1.02 (ref 1–1.03)
SQUAMOUS #/AREA URNS HPF: ABNORMAL /HPF
URN SPEC COLLECT METH UR: ABNORMAL
UROBILINOGEN UR STRIP-ACNC: NEGATIVE EU/DL
WBC # BLD AUTO: 8.01 K/UL (ref 3.9–12.7)
WBC #/AREA URNS HPF: 4 /HPF (ref 0–5)

## 2019-05-16 PROCEDURE — 63600175 PHARM REV CODE 636 W HCPCS: Performed by: PHYSICIAN ASSISTANT

## 2019-05-16 PROCEDURE — 80053 COMPREHEN METABOLIC PANEL: CPT

## 2019-05-16 PROCEDURE — 81000 URINALYSIS NONAUTO W/SCOPE: CPT

## 2019-05-16 PROCEDURE — 85025 COMPLETE CBC W/AUTO DIFF WBC: CPT

## 2019-05-16 PROCEDURE — 81025 URINE PREGNANCY TEST: CPT | Performed by: PHYSICIAN ASSISTANT

## 2019-05-16 PROCEDURE — 99284 EMERGENCY DEPT VISIT MOD MDM: CPT | Mod: 25

## 2019-05-16 PROCEDURE — 25000003 PHARM REV CODE 250: Performed by: PHYSICIAN ASSISTANT

## 2019-05-16 PROCEDURE — 96372 THER/PROPH/DIAG INJ SC/IM: CPT

## 2019-05-16 PROCEDURE — 83605 ASSAY OF LACTIC ACID: CPT

## 2019-05-16 RX ORDER — KETOROLAC TROMETHAMINE 30 MG/ML
15 INJECTION, SOLUTION INTRAMUSCULAR; INTRAVENOUS
Status: COMPLETED | OUTPATIENT
Start: 2019-05-16 | End: 2019-05-16

## 2019-05-16 RX ORDER — OXYCODONE AND ACETAMINOPHEN 5; 325 MG/1; MG/1
1 TABLET ORAL EVERY 4 HOURS PRN
Qty: 12 TABLET | Refills: 0 | Status: SHIPPED | OUTPATIENT
Start: 2019-05-16 | End: 2019-08-20

## 2019-05-16 RX ORDER — ONDANSETRON 8 MG/1
8 TABLET, ORALLY DISINTEGRATING ORAL
Status: COMPLETED | OUTPATIENT
Start: 2019-05-16 | End: 2019-05-16

## 2019-05-16 RX ORDER — KETOROLAC TROMETHAMINE 10 MG/1
10 TABLET, FILM COATED ORAL 3 TIMES DAILY PRN
Qty: 15 TABLET | Refills: 0 | Status: SHIPPED | OUTPATIENT
Start: 2019-05-16 | End: 2019-05-21

## 2019-05-16 RX ORDER — TAMSULOSIN HYDROCHLORIDE 0.4 MG/1
0.4 CAPSULE ORAL DAILY
Qty: 7 CAPSULE | Refills: 0 | Status: SHIPPED | OUTPATIENT
Start: 2019-05-16 | End: 2019-08-20

## 2019-05-16 RX ORDER — ONDANSETRON 4 MG/1
4 TABLET, ORALLY DISINTEGRATING ORAL EVERY 6 HOURS PRN
Qty: 20 TABLET | Refills: 0 | Status: SHIPPED | OUTPATIENT
Start: 2019-05-16 | End: 2019-08-20

## 2019-05-16 RX ADMIN — ONDANSETRON 8 MG: 8 TABLET, ORALLY DISINTEGRATING ORAL at 02:05

## 2019-05-16 RX ADMIN — KETOROLAC TROMETHAMINE 15 MG: 30 INJECTION, SOLUTION INTRAMUSCULAR at 02:05

## 2019-05-16 NOTE — ED TRIAGE NOTES
Pt presents to ED c/o R flank pain that radiates to back and pelvis. States pain woke her up out of her sleep. Pt also reports recent hospital admission for septic kidney infection.

## 2019-05-16 NOTE — DISCHARGE INSTRUCTIONS
Flomax x 1 week. Toradol for pain. Percocet for severe pain. Zofran for nausea. Drink lots of fluids, stay well hydrated. Follow-up with your primary care provider for reevaluation. Establish care with urology should symptoms persist despite treatment. Return to this ED if unable to tolerate pain, if you begin with fever, if you experience worsening pain, if any other problems occur.

## 2019-05-16 NOTE — ED PROVIDER NOTES
Encounter Date: 2019       History     Chief Complaint   Patient presents with    Flank Pain     Right lower back pain that radiates into right abdomen. Pt reports pain is sharp and constant. Woke her up tonight, +nausea      22-year-old female presents to ED complaining of acute onset right flank pain which woke her from sleep.  She states pain is a sharp, stabbing pain, which radiates to her right lower quadrant.  She denies any abdominal pain. She does admit to nausea without emesis.  She denies fever.  She denies trauma. No radiculopathy or paresthesia.  Pain is constant, not worsened with movement.  It is hard for patient to find a comfortable position.  She denies dysuria, hematuria, strong odor to urine, or increased urinary frequency. She denies rash. She states this is different from her episode of left flank pain approximately 10 days ago.  She denies history of any abdominal surgeries.  She denies any vaginal bleeding, spotting, discharge. She denies suspicion for STI.  No change in bowel or bladder habits.  Pain is acute, constant, severity 8/10.    LMP .        Review of patient's allergies indicates:   Allergen Reactions    Bactrim [sulfamethoxazole-trimethoprim] Hives    Ciprofloxacin Hives     Past Medical History:   Diagnosis Date    Cigarette smoker     1ppd    Influenza 2019    Pyelonephritis 2019    Sepsis      Past Surgical History:   Procedure Laterality Date    NO PAST SURGERIES  2019     Family History   Problem Relation Age of Onset    Arthritis Father     Diabetes Maternal Aunt     Breast cancer Neg Hx     Colon cancer Neg Hx     Ovarian cancer Neg Hx      Social History     Tobacco Use    Smoking status: Current Every Day Smoker     Packs/day: 0.07     Years: 1.00     Pack years: 0.07     Types: Cigarettes     Last attempt to quit: 2019     Years since quittin.3    Smokeless tobacco: Never Used   Substance Use Topics    Alcohol use: Yes      Alcohol/week: 0.0 oz     Comment: socially, monthly     Drug use: No     Review of Systems   Constitutional: Negative for chills and fever.   HENT: Negative for congestion and sore throat.    Eyes: Negative.  Negative for discharge and redness.   Respiratory: Negative for cough, chest tightness, shortness of breath, wheezing and stridor.    Cardiovascular: Negative for chest pain, palpitations and leg swelling.   Gastrointestinal: Positive for nausea. Negative for abdominal pain, anal bleeding, blood in stool, constipation, diarrhea and vomiting.   Endocrine: Negative.    Genitourinary: Positive for flank pain. Negative for difficulty urinating, dysuria, frequency, hematuria, pelvic pain, urgency, vaginal bleeding, vaginal discharge and vaginal pain.   Musculoskeletal: Negative for back pain, myalgias, neck pain and neck stiffness.   Skin: Negative for rash.   Neurological: Negative for dizziness, weakness, light-headedness and headaches.   Hematological: Does not bruise/bleed easily.   Psychiatric/Behavioral: Negative.    All other systems reviewed and are negative.      Physical Exam     Initial Vitals [05/16/19 0155]   BP Pulse Resp Temp SpO2   (!) 131/58 66 20 97.5 °F (36.4 °C) 99 %      MAP       --         Physical Exam    Nursing note and vitals reviewed.  Constitutional: She appears well-developed and well-nourished. She is not diaphoretic. No distress.   Uncomfortable appearing, nontoxic.   HENT:   Head: Normocephalic and atraumatic.   Eyes: Conjunctivae and EOM are normal. Pupils are equal, round, and reactive to light.   Neck: Normal range of motion. Neck supple. No tracheal deviation present.   Cardiovascular: Intact distal pulses.   Pulmonary/Chest: Breath sounds normal. No stridor. No respiratory distress.   Abdominal:   Abdomen overall soft, normal bowel sounds ×4.  No significant tenderness to palpation of any quadrant.  No rebound or guarding.  No palpable mass or distention.  +ttp R CVA and R  flank. Negative Rdz sign.  No pain over McBurney's point.   Musculoskeletal: Normal range of motion. She exhibits no tenderness.   Lymphadenopathy:     She has no cervical adenopathy.   Neurological: She is alert and oriented to person, place, and time.   Skin: Skin is warm and dry. Capillary refill takes less than 2 seconds.   Psychiatric: She has a normal mood and affect. Her behavior is normal. Judgment and thought content normal.         ED Course   Procedures  Labs Reviewed   URINALYSIS, REFLEX TO URINE CULTURE - Abnormal; Notable for the following components:       Result Value    Appearance, UA Cloudy (*)     Ketones, UA 1+ (*)     Occult Blood UA 3+ (*)     Leukocytes, UA 1+ (*)     All other components within normal limits    Narrative:     Preferred Collection Type->Urine, Clean Catch   COMPREHENSIVE METABOLIC PANEL - Abnormal; Notable for the following components:    Potassium 3.3 (*)     BUN, Bld 21 (*)     All other components within normal limits   URINALYSIS MICROSCOPIC - Abnormal; Notable for the following components:    Ca Oxalate Elena, UA Many (*)     All other components within normal limits    Narrative:     Preferred Collection Type->Urine, Clean Catch   CBC W/ AUTO DIFFERENTIAL   LACTIC ACID, PLASMA   POCT URINE PREGNANCY          Imaging Results          CT Renal Stone Study ABD Pelvis WO (Final result)  Result time 05/16/19 03:31:08    Final result by Matthew Carrion MD (05/16/19 03:31:08)                 Impression:      Minimal asymmetric prominence of the right collecting system without renal/ureteral stones or saman hydronephrosis.  Although nonspecific, findings may correlate with urinary tract infection or vesicoureteral reflux.  Suggest correlation with urinalysis.      Electronically signed by: Matthew Carrion MD  Date:    05/16/2019  Time:    03:31             Narrative:    EXAMINATION:  CT RENAL STONE STUDY ABD PELVIS WO    CLINICAL HISTORY:  Flank pain, stone disease  suspected;RIGHT;    TECHNIQUE:  Low dose axial images, sagittal and coronal reformations were obtained from the lung bases to the pubic symphysis.  Oral contrast was not administered.    COMPARISON:  CT abdomen and pelvis, 05/09/2019 and 02/19/2019.    FINDINGS:  Lower chest: Heart size is normal. Lung bases are essentially clear. No pleural or pericardial effusion.    Abdomen:    Evaluation of the solid abdominal organs and bowel is limited in the absence of IV contrast.    Liver is normal in size and contour without focal contour deforming lesion. Gallbladder is normal in appearance. No stones or wall thickening. No intra-or extrahepatic biliary ductal dilatation.    Spleen, adrenals, and pancreas are unremarkable.    Kidneys are symmetric.  No renal or ureteral stones.  Minimal prominence of the right collecting system but no saman hydronephrosis.    No distended loops of small bowel. No findings of acute appendicitis.    Abdominal aorta is normal in course and caliber.    No abdominal lymphadenopathy.    No abdominal free fluid or pneumoperitoneum.    Pelvis: Urinary bladder is decompressed and not well evaluated.  Rectum and uterus are unremarkable.  Small volume pelvic free fluid, which could be physiologic.    Bones and soft tissues: No aggressive osseous lesions. Soft tissues are unremarkable.                                 Medical Decision Making:   Differential Diagnosis:   Muscle strain, over the thighs, pyelonephritis, appendicitis, constipation  Clinical Tests:   Lab Tests: Ordered  Radiological Study: Ordered  ED Management:  R flank and CVA ttp. R flank pain radiating to RLQ. No RLQ ttp. No fever. Vitals reassuring. No significant comorbidities. Labs unremarkable. Lactic not significantly elevated. Very mild R sided hydronephrosis without obvious obstruction. Reflux vs pyelo vs nephrolithiasis passed.     Calcium oxalate stones, hematuria, without obvious infection; likely passed stone. PCP f/u.  Urology f/u. Return precautions given.                       Clinical Impression:       ICD-10-CM ICD-9-CM   1. Right flank pain R10.9 789.09         Disposition:   Disposition: Discharged  Condition: Stable                        Agus Reddy PA-C  05/16/19 0437

## 2019-06-28 ENCOUNTER — TELEPHONE (OUTPATIENT)
Dept: UROGYNECOLOGY | Facility: CLINIC | Age: 24
End: 2019-06-28

## 2019-06-28 NOTE — TELEPHONE ENCOUNTER
Spoke with pt whom was trying to make an appointment in urology pt message transferred to the wrong department gave her correct number she voiced understanding and call was ended.

## 2019-06-28 NOTE — TELEPHONE ENCOUNTER
----- Message from Richmond Mccoy sent at 6/28/2019 12:46 PM CDT -----  Contact: pt   Name of Who is Calling: ELEUTERIO MOMIN [[id number]]    What is the request in detail:Pt is requesting a call back regarding getting scheduled for an earlier appt pt states she has a  kidney stone and feel like she is getting an infection......  Please contact to further discuss and advise      Can the clinic reply by [facility]: yes     [facility]: [phone]

## 2019-07-01 ENCOUNTER — PATIENT OUTREACH (OUTPATIENT)
Dept: ADMINISTRATIVE | Facility: OTHER | Age: 24
End: 2019-07-01

## 2019-07-23 ENCOUNTER — TELEPHONE (OUTPATIENT)
Dept: OBSTETRICS AND GYNECOLOGY | Facility: CLINIC | Age: 24
End: 2019-07-23

## 2019-07-23 DIAGNOSIS — Z36.3 ENCOUNTER FOR ANTENATAL SCREENING FOR MALFORMATION USING ULTRASOUND: Primary | ICD-10-CM

## 2019-07-23 NOTE — TELEPHONE ENCOUNTER
----- Message from Nelly Vela MA sent at 7/23/2019  1:52 PM CDT -----  Contact: self   Pt scheduled 9/19 @ 830am, please notify patient   ----- Message -----  From: Lachelle Kim  Sent: 7/23/2019   1:27 PM  To: Girish CHAIDEZ IV Staff    I scheduled her confirmation and dating for 08/20 I was unable to schedule her initial ob visit with . The patient states if she can come in on 09/19 early morning is preferable but she can work around it if not.

## 2019-07-23 NOTE — TELEPHONE ENCOUNTER
Called patient to inform her appt is 09/19 at 8:30am. Patient states shes looking forward to seeing us.

## 2019-07-23 NOTE — TELEPHONE ENCOUNTER
----- Message from Lachelle Kim sent at 7/23/2019 11:08 AM CDT -----  Contact: pt  I called patient in regards to message and her request for . I informed the patient that  is not accepting any New OBs right now. The patient is looking for a recommendation from  on who thinks is best.   ----- Message -----  From: Galilea Mondragon  Sent: 7/23/2019  10:53 AM  To: Lachelle Kim    Can the clinic reply in [facility]:Yes    Who Called:ELEUTERIO MOMIN [[id number]]    Date of Positive Preg Test: 07/21/2019    1st day of Last Menstrual Cycle:06/22/2019    List Any Difficulties: no    What Number to Call Back: [phone] , patient prefers Dr. Stout

## 2019-08-05 ENCOUNTER — PATIENT MESSAGE (OUTPATIENT)
Dept: OBSTETRICS AND GYNECOLOGY | Facility: CLINIC | Age: 24
End: 2019-08-05

## 2019-08-18 ENCOUNTER — PATIENT OUTREACH (OUTPATIENT)
Dept: ADMINISTRATIVE | Facility: OTHER | Age: 24
End: 2019-08-18

## 2019-08-20 ENCOUNTER — LAB VISIT (OUTPATIENT)
Dept: LAB | Facility: OTHER | Age: 24
End: 2019-08-20
Payer: COMMERCIAL

## 2019-08-20 ENCOUNTER — PROCEDURE VISIT (OUTPATIENT)
Dept: OBSTETRICS AND GYNECOLOGY | Facility: CLINIC | Age: 24
End: 2019-08-20
Payer: COMMERCIAL

## 2019-08-20 ENCOUNTER — OFFICE VISIT (OUTPATIENT)
Dept: OBSTETRICS AND GYNECOLOGY | Facility: CLINIC | Age: 24
End: 2019-08-20
Payer: COMMERCIAL

## 2019-08-20 ENCOUNTER — PATIENT MESSAGE (OUTPATIENT)
Dept: OBSTETRICS AND GYNECOLOGY | Facility: CLINIC | Age: 24
End: 2019-08-20

## 2019-08-20 VITALS — SYSTOLIC BLOOD PRESSURE: 120 MMHG | BODY MASS INDEX: 50.37 KG/M2 | WEIGHT: 293 LBS | DIASTOLIC BLOOD PRESSURE: 62 MMHG

## 2019-08-20 DIAGNOSIS — Z32.01 POSITIVE PREGNANCY TEST: ICD-10-CM

## 2019-08-20 DIAGNOSIS — Z36.3 ENCOUNTER FOR ANTENATAL SCREENING FOR MALFORMATION USING ULTRASOUND: ICD-10-CM

## 2019-08-20 DIAGNOSIS — O21.9 NAUSEA/VOMITING IN PREGNANCY: ICD-10-CM

## 2019-08-20 DIAGNOSIS — N91.5 OLIGOMENORRHEA, UNSPECIFIED TYPE: ICD-10-CM

## 2019-08-20 DIAGNOSIS — Z36.82 ENCOUNTER FOR (NT) NUCHAL TRANSLUCENCY SCAN: ICD-10-CM

## 2019-08-20 DIAGNOSIS — Z36.89 ESTABLISH GESTATIONAL AGE, ULTRASOUND: ICD-10-CM

## 2019-08-20 DIAGNOSIS — N91.5 OLIGOMENORRHEA, UNSPECIFIED TYPE: Primary | ICD-10-CM

## 2019-08-20 DIAGNOSIS — N91.2 AMENORRHEA: ICD-10-CM

## 2019-08-20 LAB
ABO + RH BLD: NORMAL
B-HCG UR QL: POSITIVE
BASOPHILS # BLD AUTO: 0.03 K/UL (ref 0–0.2)
BASOPHILS NFR BLD: 0.5 % (ref 0–1.9)
BLD GP AB SCN CELLS X3 SERPL QL: NORMAL
C TRACH DNA SPEC QL NAA+PROBE: NOT DETECTED
CTP QC/QA: YES
DIFFERENTIAL METHOD: ABNORMAL
EOSINOPHIL # BLD AUTO: 0.1 K/UL (ref 0–0.5)
EOSINOPHIL NFR BLD: 1.1 % (ref 0–8)
ERYTHROCYTE [DISTWIDTH] IN BLOOD BY AUTOMATED COUNT: 13.2 % (ref 11.5–14.5)
HBV SURFACE AG SERPL QL IA: NEGATIVE
HCT VFR BLD AUTO: 36.3 % (ref 37–48.5)
HGB BLD-MCNC: 12 G/DL (ref 12–16)
HIV 1+2 AB+HIV1 P24 AG SERPL QL IA: NEGATIVE
IMM GRANULOCYTES # BLD AUTO: 0.01 K/UL (ref 0–0.04)
IMM GRANULOCYTES NFR BLD AUTO: 0.2 % (ref 0–0.5)
LYMPHOCYTES # BLD AUTO: 1.4 K/UL (ref 1–4.8)
LYMPHOCYTES NFR BLD: 25.6 % (ref 18–48)
MCH RBC QN AUTO: 27.9 PG (ref 27–31)
MCHC RBC AUTO-ENTMCNC: 33.1 G/DL (ref 32–36)
MCV RBC AUTO: 84 FL (ref 82–98)
MONOCYTES # BLD AUTO: 0.4 K/UL (ref 0.3–1)
MONOCYTES NFR BLD: 7.9 % (ref 4–15)
N GONORRHOEA DNA SPEC QL NAA+PROBE: NOT DETECTED
NEUTROPHILS # BLD AUTO: 3.5 K/UL (ref 1.8–7.7)
NEUTROPHILS NFR BLD: 64.7 % (ref 38–73)
NRBC BLD-RTO: 0 /100 WBC
PLATELET # BLD AUTO: 234 K/UL (ref 150–350)
PMV BLD AUTO: 10.2 FL (ref 9.2–12.9)
RBC # BLD AUTO: 4.3 M/UL (ref 4–5.4)
WBC # BLD AUTO: 5.47 K/UL (ref 3.9–12.7)

## 2019-08-20 PROCEDURE — 99999 PR PBB SHADOW E&M-EST. PATIENT-LVL III: ICD-10-PCS | Mod: PBBFAC,,, | Performed by: NURSE PRACTITIONER

## 2019-08-20 PROCEDURE — 99499 UNLISTED E&M SERVICE: CPT | Mod: S$GLB,,, | Performed by: OBSTETRICS & GYNECOLOGY

## 2019-08-20 PROCEDURE — 87491 CHLMYD TRACH DNA AMP PROBE: CPT

## 2019-08-20 PROCEDURE — 3008F PR BODY MASS INDEX (BMI) DOCUMENTED: ICD-10-PCS | Mod: CPTII,S$GLB,, | Performed by: NURSE PRACTITIONER

## 2019-08-20 PROCEDURE — 81025 POCT URINE PREGNANCY: ICD-10-PCS | Mod: S$GLB,,, | Performed by: NURSE PRACTITIONER

## 2019-08-20 PROCEDURE — 99499 NO LOS: ICD-10-PCS | Mod: S$GLB,,, | Performed by: OBSTETRICS & GYNECOLOGY

## 2019-08-20 PROCEDURE — 81025 URINE PREGNANCY TEST: CPT | Mod: S$GLB,,, | Performed by: NURSE PRACTITIONER

## 2019-08-20 PROCEDURE — 99999 PR PBB SHADOW E&M-EST. PATIENT-LVL III: CPT | Mod: PBBFAC,,, | Performed by: NURSE PRACTITIONER

## 2019-08-20 PROCEDURE — 86850 RBC ANTIBODY SCREEN: CPT

## 2019-08-20 PROCEDURE — 3008F BODY MASS INDEX DOCD: CPT | Mod: CPTII,S$GLB,, | Performed by: NURSE PRACTITIONER

## 2019-08-20 PROCEDURE — 86592 SYPHILIS TEST NON-TREP QUAL: CPT

## 2019-08-20 PROCEDURE — 36415 COLL VENOUS BLD VENIPUNCTURE: CPT

## 2019-08-20 PROCEDURE — 87086 URINE CULTURE/COLONY COUNT: CPT

## 2019-08-20 PROCEDURE — 76801 OB US < 14 WKS SINGLE FETUS: CPT | Mod: S$GLB,,, | Performed by: OBSTETRICS & GYNECOLOGY

## 2019-08-20 PROCEDURE — 99214 PR OFFICE/OUTPT VISIT, EST, LEVL IV, 30-39 MIN: ICD-10-PCS | Mod: S$GLB,,, | Performed by: NURSE PRACTITIONER

## 2019-08-20 PROCEDURE — 81220 CFTR GENE COM VARIANTS: CPT

## 2019-08-20 PROCEDURE — 86703 HIV-1/HIV-2 1 RESULT ANTBDY: CPT

## 2019-08-20 PROCEDURE — 86762 RUBELLA ANTIBODY: CPT

## 2019-08-20 PROCEDURE — 87340 HEPATITIS B SURFACE AG IA: CPT

## 2019-08-20 PROCEDURE — 99214 OFFICE O/P EST MOD 30 MIN: CPT | Mod: S$GLB,,, | Performed by: NURSE PRACTITIONER

## 2019-08-20 PROCEDURE — 85025 COMPLETE CBC W/AUTO DIFF WBC: CPT

## 2019-08-20 PROCEDURE — 76801 PR US, OB <14WKS, TRANSABD, SINGLE GESTATION: ICD-10-PCS | Mod: S$GLB,,, | Performed by: OBSTETRICS & GYNECOLOGY

## 2019-08-20 RX ORDER — DOXYLAMINE SUCCINATE AND PYRIDOXINE HYDROCHLORIDE, DELAYED RELEASE TABLETS 10 MG/10 MG 10; 10 MG/1; MG/1
2 TABLET, DELAYED RELEASE ORAL NIGHTLY
Qty: 100 TABLET | Refills: 0 | Status: SHIPPED | OUTPATIENT
Start: 2019-08-20 | End: 2019-12-10

## 2019-08-20 NOTE — PROGRESS NOTES
CC: Positive Pregnancy Test    HISTORY OF PRESENT ILLNESS:    Frankie Garcia is a 24 y.o. female, ,  Presents today for a routine exam complaining of amenorrhea and positive home urine pregnancy test.  Patient's last menstrual period was 2019.   She is not currently on any contraception.  Reports nausea. Reports breast tenderness. Denies vaginal bleeding and pelvic pain.  Medical h/o tobacco abuse- quit with + UPT.   Works as  for [employer].       ROS:  GENERAL: No weight changes. No swelling. No fatigue. No fever.  CARDIOVASCULAR: No chest pain. No shortness of breath. No leg cramps.   NEUROLOGICAL: No headaches. No vision changes.  BREASTS: No pain. No lumps. No discharge.  ABDOMEN: No pain. No diarrhea. No constipation.  REPRODUCTIVE: No abnormal bleeding.   VULVA: No pain. No lesions. No itching.  VAGINA: No relaxation. No itching. No odor. No discharge. No lesions.  URINARY: No incontinence. No nocturia. No frequency. No dysuria.    MEDICATIONS AND ALLERGIES:  Reviewed        COMPREHENSIVE GYN HISTORY:  PAP History: Denies abnormal Paps.  Infection History: Denies STDs. Denies PID.  Benign History: Denies uterine fibroids. Denies ovarian cysts. Denies endometriosis. Denies other conditions.  Cancer History: Denies cervical cancer. Denies uterine cancer or hyperplasia. Denies ovarian cancer. Denies vulvar cancer or pre-cancer. Denies vaginal cancer or pre-cancer. Denies breast cancer. Denies colon cancer.  Sexual Activity History: Reports currently being sexually active  Menstrual History: None.  Contraception: None    /62   Wt (!) 137.3 kg (302 lb 11.1 oz)   LMP 2019   BMI 50.37 kg/m²     PE:  AFFECT: Calm, alert and oriented X 3. Interactive during exam  GENERAL: Appears well-nourished, well-developed, in no acute distress.  HEAD: Normocephalic, atruamatic  TEETH: Good dentition.  THYROID: No thyromegally   BREASTS: No masses, skin changes, nipple discharge or  adenopathy bilaterally.  SKIN: Normal for race, warm, & dry. No lesions or rashes.  LUNGS: Easy and unlabored, clear to auscultation bilaterally.  HEART: Regular rate and rhythm   ABDOMEN: Soft and nontender without masses or organomegally.  VULVA: No lesions, masses or tenderness.  VAGINA: Moist and well rugated without lesions or discharge.  CERVIX: Moist and pink without lesions, discharge or tenderness.      UTERUS SIZE: 8 week size, nontender and without masses.  ADNEXA: No masses or tenderness.  ESTIMATE OF PELVIC CAPACITY: Adequate  EXTREMITIES: No cyanosis, clubbing or edema. No calf tenderness.  LYMPH NODES: No axillary or inguinal adenopathy.    PROCEDURES:  UPT Positive  Genprobe        ASSESSMENT/PLAN:  Amenorrhea  Positive urine pregnancy test (TIGIST: 3/28/20, EGA: 8w3d based on LMP)    -  Routine prenatal care    Nausea and vomiting in pregnancy    -  Education regarding lifestyle and dietary modifications    -  Advised use of B6/Unisom. Pt will notify us if no relief/worsening symptoms, will consider Zofran if needed.      1st TRIMESTER COUNSELING: Discussed all, booklet provided:  Common complaints of pregnancy  HIV and other routine prenatal tests including  genetic screening  Risk factors identified by prenatal history  Oriented to practice - discussed anticipated course of prenatal care & indications for Ultrasound  Childbirth classes/Hospital facilities   Nutrition and weight gain counseling  Toxoplasmosis precautions (Cats/Raw Meat)  Sexual activity and exercise  Environmental/Work hazards  Travel  Tobacco (Ask, Advise, Assess, Assist, and Arrange), as well as alcohol and drug use  Use of any medications (Including supplements, Vitamins, Herbs, or OTC Drugs)  Domestic violence  Seat belt use      TERATOLOGY COUNSELING:   Discussed indications and options for aneuploidy screening - pamphlets given    -  Pt desires NT and orders placed    Dating US later today  FOLLOW-UP in 4 weeks with   Girish Mayo, NP    OB/GYN

## 2019-08-21 LAB
BACTERIA UR CULT: NORMAL
BACTERIA UR CULT: NORMAL
RUBV IGG SER-ACNC: 56 IU/ML
RUBV IGG SER-IMP: REACTIVE

## 2019-08-23 LAB — CFTR MUT ANL BLD/T: NORMAL

## 2019-08-26 ENCOUNTER — PATIENT MESSAGE (OUTPATIENT)
Dept: OBSTETRICS AND GYNECOLOGY | Facility: CLINIC | Age: 24
End: 2019-08-26

## 2019-09-17 ENCOUNTER — PATIENT OUTREACH (OUTPATIENT)
Dept: ADMINISTRATIVE | Facility: OTHER | Age: 24
End: 2019-09-17

## 2019-09-19 ENCOUNTER — PATIENT MESSAGE (OUTPATIENT)
Dept: OBSTETRICS AND GYNECOLOGY | Facility: CLINIC | Age: 24
End: 2019-09-19

## 2019-09-19 ENCOUNTER — PROCEDURE VISIT (OUTPATIENT)
Dept: MATERNAL FETAL MEDICINE | Facility: CLINIC | Age: 24
End: 2019-09-19
Payer: COMMERCIAL

## 2019-09-19 ENCOUNTER — INITIAL PRENATAL (OUTPATIENT)
Dept: OBSTETRICS AND GYNECOLOGY | Facility: CLINIC | Age: 24
End: 2019-09-19
Payer: COMMERCIAL

## 2019-09-19 ENCOUNTER — LAB VISIT (OUTPATIENT)
Dept: LAB | Facility: OTHER | Age: 24
End: 2019-09-19
Attending: OBSTETRICS & GYNECOLOGY
Payer: COMMERCIAL

## 2019-09-19 ENCOUNTER — PATIENT MESSAGE (OUTPATIENT)
Dept: ADMINISTRATIVE | Facility: OTHER | Age: 24
End: 2019-09-19

## 2019-09-19 VITALS — SYSTOLIC BLOOD PRESSURE: 110 MMHG | DIASTOLIC BLOOD PRESSURE: 74 MMHG | WEIGHT: 291 LBS | BODY MASS INDEX: 48.43 KG/M2

## 2019-09-19 VITALS — BODY MASS INDEX: 48.28 KG/M2 | WEIGHT: 290.13 LBS

## 2019-09-19 DIAGNOSIS — Z23 NEED FOR PROPHYLACTIC VACCINATION AND INOCULATION AGAINST INFLUENZA: ICD-10-CM

## 2019-09-19 DIAGNOSIS — O21.9 VOMITING AFFECTING PREGNANCY: ICD-10-CM

## 2019-09-19 DIAGNOSIS — Z36.82 ENCOUNTER FOR (NT) NUCHAL TRANSLUCENCY SCAN: ICD-10-CM

## 2019-09-19 DIAGNOSIS — Z36.9 ENCOUNTER FOR ANTENATAL SCREENING: ICD-10-CM

## 2019-09-19 DIAGNOSIS — Z34.01 ENCOUNTER FOR SUPERVISION OF NORMAL FIRST PREGNANCY IN FIRST TRIMESTER: Primary | ICD-10-CM

## 2019-09-19 DIAGNOSIS — Z36.89 ENCOUNTER FOR FETAL ANATOMIC SURVEY: Primary | ICD-10-CM

## 2019-09-19 PROCEDURE — 36415 COLL VENOUS BLD VENIPUNCTURE: CPT

## 2019-09-19 PROCEDURE — 0500F PR INITIAL PRENATAL CARE VISIT: ICD-10-PCS | Mod: S$GLB,,, | Performed by: OBSTETRICS & GYNECOLOGY

## 2019-09-19 PROCEDURE — 99999 PR PBB SHADOW E&M-EST. PATIENT-LVL II: CPT | Mod: PBBFAC,,, | Performed by: OBSTETRICS & GYNECOLOGY

## 2019-09-19 PROCEDURE — 99999 PR PBB SHADOW E&M-EST. PATIENT-LVL II: ICD-10-PCS | Mod: PBBFAC,,, | Performed by: OBSTETRICS & GYNECOLOGY

## 2019-09-19 PROCEDURE — 76813 PR US, OB NUCHAL, TRANSABDOM/TRANSVAG, FIRST GESTATION: ICD-10-PCS | Mod: S$GLB,,, | Performed by: OBSTETRICS & GYNECOLOGY

## 2019-09-19 PROCEDURE — 0500F INITIAL PRENATAL CARE VISIT: CPT | Mod: S$GLB,,, | Performed by: OBSTETRICS & GYNECOLOGY

## 2019-09-19 PROCEDURE — 84163 PAPPA SERUM: CPT

## 2019-09-19 PROCEDURE — 76813 OB US NUCHAL MEAS 1 GEST: CPT | Mod: S$GLB,,, | Performed by: OBSTETRICS & GYNECOLOGY

## 2019-09-19 RX ORDER — PROMETHAZINE HYDROCHLORIDE 25 MG/1
25 TABLET ORAL EVERY 4 HOURS
Qty: 20 TABLET | Refills: 1 | Status: SHIPPED | OUTPATIENT
Start: 2019-09-19 | End: 2019-10-08 | Stop reason: SDUPTHER

## 2019-09-19 NOTE — PROGRESS NOTES
Ob counseling/1st trimester counseling done today.  Discussed nausea vomiting of pregnancy and recommendations/treatment.  Phenergan PO ordered today  Ob labs reviewed  Influenza vaccine recommendation reviewed.  Order placed.

## 2019-09-20 ENCOUNTER — IMMUNIZATION (OUTPATIENT)
Dept: PHARMACY | Facility: CLINIC | Age: 24
End: 2019-09-20
Payer: COMMERCIAL

## 2019-09-23 ENCOUNTER — TELEPHONE (OUTPATIENT)
Dept: OBSTETRICS AND GYNECOLOGY | Facility: CLINIC | Age: 24
End: 2019-09-23

## 2019-09-23 ENCOUNTER — INFUSION (OUTPATIENT)
Dept: INFUSION THERAPY | Facility: OTHER | Age: 24
End: 2019-09-23
Attending: INTERNAL MEDICINE
Payer: COMMERCIAL

## 2019-09-23 ENCOUNTER — PATIENT MESSAGE (OUTPATIENT)
Dept: OBSTETRICS AND GYNECOLOGY | Facility: CLINIC | Age: 24
End: 2019-09-23

## 2019-09-23 VITALS
DIASTOLIC BLOOD PRESSURE: 65 MMHG | TEMPERATURE: 98 F | BODY MASS INDEX: 47.91 KG/M2 | RESPIRATION RATE: 20 BRPM | HEART RATE: 86 BPM | OXYGEN SATURATION: 95 % | SYSTOLIC BLOOD PRESSURE: 121 MMHG | WEIGHT: 287.94 LBS

## 2019-09-23 DIAGNOSIS — R11.10 HYPEREMESIS: Primary | ICD-10-CM

## 2019-09-23 DIAGNOSIS — R11.10 HYPEREMESIS: ICD-10-CM

## 2019-09-23 LAB — INTEGRATED SCN PATIENT-IMP NAR: NORMAL

## 2019-09-23 PROCEDURE — 63600175 PHARM REV CODE 636 W HCPCS: Performed by: NURSE PRACTITIONER

## 2019-09-23 PROCEDURE — 96360 HYDRATION IV INFUSION INIT: CPT

## 2019-09-23 RX ORDER — SODIUM CHLORIDE 9 MG/ML
10 INJECTION, SOLUTION INTRAMUSCULAR; INTRAVENOUS; SUBCUTANEOUS
Status: CANCELLED | OUTPATIENT
Start: 2019-09-23

## 2019-09-23 RX ADMIN — SODIUM CHLORIDE 1000 ML: 0.9 INJECTION, SOLUTION INTRAVENOUS at 11:09

## 2019-09-23 NOTE — TELEPHONE ENCOUNTER
Per Bethany Mayo NP. Patient instructed to report to [facility] or Chemo infusion for IV hydration. Patient has been scheduled for IV fluids in Chemo infusion today at 1145. Patient will have grandmother bring her, instructed patient on small sips of water every 1/2 hour. Patient verbalized understanding.

## 2019-09-25 ENCOUNTER — INFUSION (OUTPATIENT)
Dept: INFUSION THERAPY | Facility: OTHER | Age: 24
End: 2019-09-25
Attending: INTERNAL MEDICINE
Payer: COMMERCIAL

## 2019-09-25 VITALS
BODY MASS INDEX: 48.13 KG/M2 | HEART RATE: 73 BPM | TEMPERATURE: 98 F | RESPIRATION RATE: 18 BRPM | WEIGHT: 289.25 LBS | DIASTOLIC BLOOD PRESSURE: 57 MMHG | SYSTOLIC BLOOD PRESSURE: 126 MMHG | OXYGEN SATURATION: 99 %

## 2019-09-25 DIAGNOSIS — R11.10 HYPEREMESIS: Primary | ICD-10-CM

## 2019-09-25 PROCEDURE — 96367 TX/PROPH/DG ADDL SEQ IV INF: CPT

## 2019-09-25 PROCEDURE — 25000003 PHARM REV CODE 250: Performed by: NURSE PRACTITIONER

## 2019-09-25 PROCEDURE — 96365 THER/PROPH/DIAG IV INF INIT: CPT

## 2019-09-25 PROCEDURE — 63600175 PHARM REV CODE 636 W HCPCS: Performed by: NURSE PRACTITIONER

## 2019-09-25 PROCEDURE — 96366 THER/PROPH/DIAG IV INF ADDON: CPT

## 2019-09-25 RX ORDER — SODIUM CHLORIDE 9 MG/ML
10 INJECTION, SOLUTION INTRAMUSCULAR; INTRAVENOUS; SUBCUTANEOUS
Status: DISCONTINUED | OUTPATIENT
Start: 2019-09-25 | End: 2019-09-25 | Stop reason: HOSPADM

## 2019-09-25 RX ORDER — SODIUM CHLORIDE 9 MG/ML
10 INJECTION, SOLUTION INTRAMUSCULAR; INTRAVENOUS; SUBCUTANEOUS
Status: CANCELLED | OUTPATIENT
Start: 2019-09-25

## 2019-09-25 RX ADMIN — THIAMINE HYDROCHLORIDE: 100 INJECTION, SOLUTION INTRAMUSCULAR; INTRAVENOUS at 09:09

## 2019-09-25 RX ADMIN — PROMETHAZINE HYDROCHLORIDE 25 MG: 25 INJECTION INTRAMUSCULAR; INTRAVENOUS at 08:09

## 2019-10-08 ENCOUNTER — PATIENT MESSAGE (OUTPATIENT)
Dept: OBSTETRICS AND GYNECOLOGY | Facility: CLINIC | Age: 24
End: 2019-10-08

## 2019-10-08 DIAGNOSIS — O21.9 VOMITING AFFECTING PREGNANCY: ICD-10-CM

## 2019-10-08 RX ORDER — PROMETHAZINE HYDROCHLORIDE 25 MG/1
25 TABLET ORAL EVERY 4 HOURS
Qty: 20 TABLET | Refills: 1 | Status: SHIPPED | OUTPATIENT
Start: 2019-10-08 | End: 2019-11-25 | Stop reason: SDUPTHER

## 2019-10-08 NOTE — TELEPHONE ENCOUNTER
Attempted to leave message letting pt know that a refill has been sent to [facility] - can not leave a message as voice mail is full.

## 2019-10-08 NOTE — TELEPHONE ENCOUNTER
----- Message from Mariza Perez sent at 10/8/2019  8:59 AM CDT -----  Contact: ELEUTERIO MOMIN [[id number]]  Can the clinic reply in [facility]: Y       Please refill the medication(s) listed below. Please call the patient when the prescription(s) is ready for  at this phone number  [phone]      Medication #1 promethazine (PHENERGAN) 25 MG tablet      Preferred Pharmacy: [facility] [address] [facility]. [place]

## 2019-10-15 ENCOUNTER — PATIENT OUTREACH (OUTPATIENT)
Dept: ADMINISTRATIVE | Facility: OTHER | Age: 24
End: 2019-10-15

## 2019-10-17 ENCOUNTER — ROUTINE PRENATAL (OUTPATIENT)
Dept: OBSTETRICS AND GYNECOLOGY | Facility: CLINIC | Age: 24
End: 2019-10-17
Payer: COMMERCIAL

## 2019-10-17 ENCOUNTER — LAB VISIT (OUTPATIENT)
Dept: LAB | Facility: OTHER | Age: 24
End: 2019-10-17
Attending: OBSTETRICS & GYNECOLOGY
Payer: COMMERCIAL

## 2019-10-17 VITALS — SYSTOLIC BLOOD PRESSURE: 110 MMHG | DIASTOLIC BLOOD PRESSURE: 56 MMHG | BODY MASS INDEX: 49.56 KG/M2 | WEIGHT: 293 LBS

## 2019-10-17 DIAGNOSIS — Z36.9 ENCOUNTER FOR ANTENATAL SCREENING: ICD-10-CM

## 2019-10-17 DIAGNOSIS — Z34.02 ENCOUNTER FOR SUPERVISION OF NORMAL FIRST PREGNANCY IN SECOND TRIMESTER: Primary | ICD-10-CM

## 2019-10-17 DIAGNOSIS — Z13.1 ENCOUNTER FOR SCREENING EXAMINATION FOR IMPAIRED GLUCOSE REGULATION AND DIABETES MELLITUS: ICD-10-CM

## 2019-10-17 PROCEDURE — 0502F PR SUBSEQUENT PRENATAL CARE: ICD-10-PCS | Mod: CPTII,S$GLB,, | Performed by: OBSTETRICS & GYNECOLOGY

## 2019-10-17 PROCEDURE — 99999 PR PBB SHADOW E&M-EST. PATIENT-LVL II: ICD-10-PCS | Mod: PBBFAC,,, | Performed by: OBSTETRICS & GYNECOLOGY

## 2019-10-17 PROCEDURE — 0502F SUBSEQUENT PRENATAL CARE: CPT | Mod: CPTII,S$GLB,, | Performed by: OBSTETRICS & GYNECOLOGY

## 2019-10-17 PROCEDURE — 99999 PR PBB SHADOW E&M-EST. PATIENT-LVL II: CPT | Mod: PBBFAC,,, | Performed by: OBSTETRICS & GYNECOLOGY

## 2019-10-17 PROCEDURE — 36415 COLL VENOUS BLD VENIPUNCTURE: CPT

## 2019-10-21 LAB
# FETUSES US: NORMAL
AFP MOM SERPL: 1.46
AFP SERPL-MCNC: 33.9 NG/ML
AGE AT DELIVERY: 22
B-HCG MOM SERPL: 1.54
B-HCG SERPL-ACNC: 31.2 IU/ML
COLLECT DATE BLD: NORMAL
COLLECT DATE: NORMAL
FET TS 21 RISK FROM MAT AGE: NORMAL
GA (DAYS): 5 D
GA (WEEKS): 12 WK
GA METHOD: NORMAL
GEST. AGE (DAYS) 2ND SAMPLE (SI2): 5
GEST. AGE (WKS) 2ND SAMPLE (SI2): 16
IDDM PATIENT QL: NORMAL
INHIBIN A MOM SERPL: 0.94
INHIBIN A SERPL-MCNC: 98.2 PG/ML
INTEGRATED SCN PATIENT-IMP NAR: NORMAL
INTEGRATED SCN PATIENT-IMP: NEGATIVE
PAPP-A MOM SERPL: 3.07
PAPP-A SERPL-MCNC: NORMAL NG/ML
SMOKING STATUS FTND: NO
TS 18 RISK FETUS: NORMAL
TS 21 RISK FETUS: NORMAL
U ESTRIOL MOM SERPL: 1.16
U ESTRIOL SERPL-MCNC: 0.99 NG/ML

## 2019-10-22 ENCOUNTER — PATIENT MESSAGE (OUTPATIENT)
Dept: OBSTETRICS AND GYNECOLOGY | Facility: CLINIC | Age: 24
End: 2019-10-22

## 2019-10-22 ENCOUNTER — TELEPHONE (OUTPATIENT)
Dept: OBSTETRICS AND GYNECOLOGY | Facility: CLINIC | Age: 24
End: 2019-10-22

## 2019-10-22 NOTE — TELEPHONE ENCOUNTER
----- Message from Jaydon Perez, Patient Care Assistant sent at 10/22/2019  9:50 AM CDT -----  Contact: ELEUTERIO MOMIN [[id number]]  Name of Who is Calling: ELEUTERIO MOMIN [[id number]]    What is the request in detail: Requesting a call back in regards of not felling well. Patient states she has fever, vomiting, throat hurt and sinus issues. Patient  Please contact to further discuss and advise      Can the clinic reply by [facility]: No    What Number to Call Back if not in [facility]:   [phone]

## 2019-10-22 NOTE — TELEPHONE ENCOUNTER
Spoke with pt  Pt informed Dr Stout was out of office today.  Pt reports vomiting at 3am this morning, and having a tempeture of 100.5.  Pt reports taking Tylenol and reducing temp to 99.7.  Pt reports sore throat.  Per Dr Clark: pt was instructed to go to  for flu swab as well as strep.    Pt went to ENT at .  Pt states she was swabbed for Strep and not the flu. Staff requested pt contact ENT and see if they could do a flu swab off of sample.    Pt stated she swabbed NEG for strep and was placed on amoxicillin.  ENT requested pt to return to them in 2 days if she did not feel better.  Pt reports normal temp and no more vomiting at this time.    Pt will call with updates on symptoms.    Pt verbalized understanding.

## 2019-11-07 ENCOUNTER — PROCEDURE VISIT (OUTPATIENT)
Dept: MATERNAL FETAL MEDICINE | Facility: CLINIC | Age: 24
End: 2019-11-07
Payer: COMMERCIAL

## 2019-11-07 DIAGNOSIS — Z36.89 ENCOUNTER FOR FETAL ANATOMIC SURVEY: ICD-10-CM

## 2019-11-07 DIAGNOSIS — Z36.89 ENCOUNTER FOR ULTRASOUND TO ASSESS FETAL GROWTH: ICD-10-CM

## 2019-11-07 DIAGNOSIS — O99.212 OBESITY AFFECTING PREGNANCY IN SECOND TRIMESTER: ICD-10-CM

## 2019-11-07 PROCEDURE — 76811 PR US, OB FETAL EVAL & EXAM, TRANSABDOM,FIRST GESTATION: ICD-10-PCS | Mod: S$GLB,,, | Performed by: OBSTETRICS & GYNECOLOGY

## 2019-11-07 PROCEDURE — 99499 UNLISTED E&M SERVICE: CPT | Mod: S$GLB,,, | Performed by: OBSTETRICS & GYNECOLOGY

## 2019-11-07 PROCEDURE — 76811 OB US DETAILED SNGL FETUS: CPT | Mod: S$GLB,,, | Performed by: OBSTETRICS & GYNECOLOGY

## 2019-11-07 PROCEDURE — 99499 NO LOS: ICD-10-PCS | Mod: S$GLB,,, | Performed by: OBSTETRICS & GYNECOLOGY

## 2019-11-08 ENCOUNTER — TELEPHONE (OUTPATIENT)
Dept: OBSTETRICS AND GYNECOLOGY | Facility: CLINIC | Age: 24
End: 2019-11-08

## 2019-11-08 NOTE — TELEPHONE ENCOUNTER
----- Message from Matthew Stout IV, MD sent at 10/28/2019  5:21 PM CDT -----  Serum integrated screen 2:    SCREEN NEGATIVE FOR DOWN SYNDROME, NEURAL TUBE DEFECTS   AND TRISOMY 18.    Matthew Stout IV, MD

## 2019-11-09 ENCOUNTER — PATIENT MESSAGE (OUTPATIENT)
Dept: OBSTETRICS AND GYNECOLOGY | Facility: CLINIC | Age: 24
End: 2019-11-09

## 2019-11-12 ENCOUNTER — HOSPITAL ENCOUNTER (EMERGENCY)
Facility: OTHER | Age: 24
Discharge: HOME | End: 2019-11-12
Attending: OBSTETRICS & GYNECOLOGY
Payer: COMMERCIAL

## 2019-11-12 ENCOUNTER — NURSE TRIAGE (OUTPATIENT)
Dept: ADMINISTRATIVE | Facility: CLINIC | Age: 24
End: 2019-11-12
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
## 2019-11-12 DIAGNOSIS — Z3A.20 PREGNANCY WITH 20 COMPLETED WEEKS GESTATION: ICD-10-CM

## 2019-11-12 PROCEDURE — 99282 EMERGENCY DEPT VISIT SF MDM: CPT

## 2019-11-12 PROCEDURE — 99283 EMERGENCY DEPT VISIT LOW MDM: CPT | Mod: ,,, | Performed by: OBSTETRICS & GYNECOLOGY

## 2019-11-12 PROCEDURE — 99283 PR EMERGENCY DEPT VISIT,LEVEL III: ICD-10-PCS | Mod: ,,, | Performed by: OBSTETRICS & GYNECOLOGY
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
## 2019-11-25 DIAGNOSIS — O21.9 VOMITING AFFECTING PREGNANCY: ICD-10-CM

## 2019-11-25 RX ORDER — PROMETHAZINE HYDROCHLORIDE 25 MG/1
25 TABLET ORAL EVERY 4 HOURS
Qty: 20 TABLET | Refills: 1 | Status: SHIPPED | OUTPATIENT
Start: 2019-11-25 | End: 2019-12-10

## 2019-11-26 ENCOUNTER — PATIENT MESSAGE (OUTPATIENT)
Dept: OBSTETRICS AND GYNECOLOGY | Facility: CLINIC | Age: 24
End: 2019-11-26

## 2019-12-03 ENCOUNTER — CLINICAL SUPPORT (OUTPATIENT)
Dept: OBSTETRICS AND GYNECOLOGY | Facility: CLINIC | Age: 24
End: 2019-12-03
Payer: COMMERCIAL

## 2019-12-03 ENCOUNTER — PATIENT MESSAGE (OUTPATIENT)
Dept: OBSTETRICS AND GYNECOLOGY | Facility: CLINIC | Age: 24
End: 2019-12-03

## 2019-12-03 VITALS
TEMPERATURE: 99 F | BODY MASS INDEX: 48.82 KG/M2 | HEIGHT: 65 IN | WEIGHT: 293 LBS | SYSTOLIC BLOOD PRESSURE: 122 MMHG | DIASTOLIC BLOOD PRESSURE: 60 MMHG

## 2019-12-03 DIAGNOSIS — O26.892 PREGNANCY HEADACHE IN SECOND TRIMESTER: Primary | ICD-10-CM

## 2019-12-03 DIAGNOSIS — R51.9 PREGNANCY HEADACHE IN SECOND TRIMESTER: Primary | ICD-10-CM

## 2019-12-03 PROCEDURE — 99211 OFF/OP EST MAY X REQ PHY/QHP: CPT | Mod: S$GLB,,, | Performed by: OBSTETRICS & GYNECOLOGY

## 2019-12-03 PROCEDURE — 99211 PR OFFICE/OUTPT VISIT, EST, LEVL I: ICD-10-PCS | Mod: S$GLB,,, | Performed by: OBSTETRICS & GYNECOLOGY

## 2019-12-03 PROCEDURE — 99999 PR PBB SHADOW E&M-EST. PATIENT-LVL III: ICD-10-PCS | Mod: PBBFAC,,, | Performed by: OBSTETRICS & GYNECOLOGY

## 2019-12-03 PROCEDURE — 99999 PR PBB SHADOW E&M-EST. PATIENT-LVL III: CPT | Mod: PBBFAC,,, | Performed by: OBSTETRICS & GYNECOLOGY

## 2019-12-04 NOTE — PROGRESS NOTES
CC:  Mild headache in pregnancy    Frankie Garcia is a 24 y.o. female patient  who presents today at 23 weeks 3 day gestation with complaint mild headache and swelling. Patient is concerned maybe blood pressure issues.  Patient is here for blood pressure check.  Patient reports active fetus.    Patient denies scotoma or right upper quadrant pain today.  Patient denies any elevated blood pressures on connected mom program.  Patient was told at work that she may need to get checked out due to this headache.      Past Medical History:   Diagnosis Date    Cigarette smoker     1ppd    Influenza 2019    Pyelonephritis 2019    Sepsis        Past Surgical History:   Procedure Laterality Date    NO PAST SURGERIES  2019         ROS:  GENERAL: Feeling well overall.   SKIN: Denies rash or lesions.   HEAD: Denies head injury or headache.   NODES: Denies enlarged lymph nodes.   CHEST: Denies chest pain or shortness of breath.   CARDIOVASCULAR: Denies palpitations or left sided chest pain.   ABDOMEN: No abdominal pain, nausea, vomiting or rectal bleeding.   URINARY: No dysuria, hematuria, or burning on urination.  REPRODUCTIVE: See HPI.   BREASTS: Denies pain, lumps, or nipple discharge.   HEMATOLOGIC: No easy bruisability or excessive bleeding.   MUSCULOSKELETAL: Denies joint pain or swelling.   NEUROLOGIC: Denies syncope or weakness.   PSYCHIATRIC: Denies depression.    PE:   BP:  122/60  Abdomen:  Fundal height 22.5 cm.  Fetal heart rate:  142 beats per minute  No abdominal tenderness noted on palpation.  Uterus soft on palpation today.    Pelvic exam deferred    Diagnosis:  1. Pregnancy headache in second trimester          PLAN:  BP is normotensive today.  Signs/symptoms of severe preeclampsia discussed with patient.  Patient verbalized understanding.  Patient instructed to continue to follow-up with connected mom program/checking blood pressures through connected mom program.    Follow-up:  Keep  scheduled prenatal visit follow-up    Matthew Stout IV, MD

## 2019-12-10 ENCOUNTER — ROUTINE PRENATAL (OUTPATIENT)
Dept: OBSTETRICS AND GYNECOLOGY | Facility: CLINIC | Age: 24
End: 2019-12-10
Payer: COMMERCIAL

## 2019-12-10 ENCOUNTER — LAB VISIT (OUTPATIENT)
Dept: LAB | Facility: OTHER | Age: 24
End: 2019-12-10
Attending: OBSTETRICS & GYNECOLOGY
Payer: COMMERCIAL

## 2019-12-10 ENCOUNTER — PROCEDURE VISIT (OUTPATIENT)
Dept: MATERNAL FETAL MEDICINE | Facility: CLINIC | Age: 24
End: 2019-12-10
Payer: COMMERCIAL

## 2019-12-10 VITALS — WEIGHT: 293 LBS | SYSTOLIC BLOOD PRESSURE: 118 MMHG | DIASTOLIC BLOOD PRESSURE: 78 MMHG | BODY MASS INDEX: 50 KG/M2

## 2019-12-10 DIAGNOSIS — O99.210 MATERNAL MORBID OBESITY, ANTEPARTUM: ICD-10-CM

## 2019-12-10 DIAGNOSIS — Z23 NEED FOR TDAP VACCINATION: ICD-10-CM

## 2019-12-10 DIAGNOSIS — E66.01 MATERNAL MORBID OBESITY, ANTEPARTUM: ICD-10-CM

## 2019-12-10 DIAGNOSIS — Z34.02 ENCOUNTER FOR SUPERVISION OF NORMAL FIRST PREGNANCY IN SECOND TRIMESTER: Primary | ICD-10-CM

## 2019-12-10 DIAGNOSIS — Z13.1 ENCOUNTER FOR SCREENING EXAMINATION FOR IMPAIRED GLUCOSE REGULATION AND DIABETES MELLITUS: ICD-10-CM

## 2019-12-10 DIAGNOSIS — Z36.89 ENCOUNTER FOR ULTRASOUND TO ASSESS FETAL GROWTH: ICD-10-CM

## 2019-12-10 LAB — GLUCOSE SERPL-MCNC: 76 MG/DL (ref 70–140)

## 2019-12-10 PROCEDURE — 0502F SUBSEQUENT PRENATAL CARE: CPT | Mod: CPTII,S$GLB,, | Performed by: OBSTETRICS & GYNECOLOGY

## 2019-12-10 PROCEDURE — 82950 GLUCOSE TEST: CPT

## 2019-12-10 PROCEDURE — 99999 PR PBB SHADOW E&M-EST. PATIENT-LVL III: CPT | Mod: PBBFAC,,, | Performed by: OBSTETRICS & GYNECOLOGY

## 2019-12-10 PROCEDURE — 76816 PR  US,PREGNANT UTERUS,F/U,TRANSABD APP: ICD-10-PCS | Mod: S$GLB,,, | Performed by: OBSTETRICS & GYNECOLOGY

## 2019-12-10 PROCEDURE — 36415 COLL VENOUS BLD VENIPUNCTURE: CPT

## 2019-12-10 PROCEDURE — 99999 PR PBB SHADOW E&M-EST. PATIENT-LVL III: ICD-10-PCS | Mod: PBBFAC,,, | Performed by: OBSTETRICS & GYNECOLOGY

## 2019-12-10 PROCEDURE — 0502F PR SUBSEQUENT PRENATAL CARE: ICD-10-PCS | Mod: CPTII,S$GLB,, | Performed by: OBSTETRICS & GYNECOLOGY

## 2019-12-10 PROCEDURE — 76816 OB US FOLLOW-UP PER FETUS: CPT | Mod: S$GLB,,, | Performed by: OBSTETRICS & GYNECOLOGY

## 2019-12-10 RX ORDER — PROMETHAZINE HYDROCHLORIDE 25 MG/1
25 TABLET ORAL
Status: ON HOLD | COMMUNITY
Start: 2019-10-08 | End: 2020-03-27 | Stop reason: HOSPADM

## 2019-12-10 NOTE — PROGRESS NOTES
Continue routine obstetric care/connected mom.  Patient to complete anatomy sonogram today.  Gestational diabetes screening today.   labor signs and symptoms reviewed.

## 2019-12-16 ENCOUNTER — PATIENT MESSAGE (OUTPATIENT)
Dept: OBSTETRICS AND GYNECOLOGY | Facility: CLINIC | Age: 24
End: 2019-12-16

## 2020-01-03 ENCOUNTER — PATIENT MESSAGE (OUTPATIENT)
Dept: OBSTETRICS AND GYNECOLOGY | Facility: CLINIC | Age: 25
End: 2020-01-03

## 2020-01-07 ENCOUNTER — CLINICAL SUPPORT (OUTPATIENT)
Dept: OBSTETRICS AND GYNECOLOGY | Facility: CLINIC | Age: 25
End: 2020-01-07
Payer: COMMERCIAL

## 2020-01-07 ENCOUNTER — PROCEDURE VISIT (OUTPATIENT)
Dept: MATERNAL FETAL MEDICINE | Facility: CLINIC | Age: 25
End: 2020-01-07
Payer: COMMERCIAL

## 2020-01-07 ENCOUNTER — ROUTINE PRENATAL (OUTPATIENT)
Dept: OBSTETRICS AND GYNECOLOGY | Facility: CLINIC | Age: 25
End: 2020-01-07
Payer: COMMERCIAL

## 2020-01-07 VITALS — BODY MASS INDEX: 49.38 KG/M2 | SYSTOLIC BLOOD PRESSURE: 122 MMHG | WEIGHT: 293 LBS | DIASTOLIC BLOOD PRESSURE: 64 MMHG

## 2020-01-07 DIAGNOSIS — Z36.89 ENCOUNTER FOR ULTRASOUND TO ASSESS FETAL GROWTH: ICD-10-CM

## 2020-01-07 DIAGNOSIS — Z34.03 ENCOUNTER FOR SUPERVISION OF NORMAL FIRST PREGNANCY IN THIRD TRIMESTER: Primary | ICD-10-CM

## 2020-01-07 DIAGNOSIS — O99.210 MATERNAL OBESITY AFFECTING PREGNANCY, ANTEPARTUM: ICD-10-CM

## 2020-01-07 DIAGNOSIS — Z23 NEED FOR TDAP VACCINATION: ICD-10-CM

## 2020-01-07 PROCEDURE — 99999 PR PBB SHADOW E&M-EST. PATIENT-LVL I: ICD-10-PCS | Mod: PBBFAC,,,

## 2020-01-07 PROCEDURE — 90715 TDAP VACCINE GREATER THAN OR EQUAL TO 7YO IM: ICD-10-PCS | Mod: S$GLB,,, | Performed by: OBSTETRICS & GYNECOLOGY

## 2020-01-07 PROCEDURE — 0502F SUBSEQUENT PRENATAL CARE: CPT | Mod: CPTII,S$GLB,, | Performed by: OBSTETRICS & GYNECOLOGY

## 2020-01-07 PROCEDURE — 90715 TDAP VACCINE 7 YRS/> IM: CPT | Mod: S$GLB,,, | Performed by: OBSTETRICS & GYNECOLOGY

## 2020-01-07 PROCEDURE — 99999 PR PBB SHADOW E&M-EST. PATIENT-LVL I: CPT | Mod: PBBFAC,,,

## 2020-01-07 PROCEDURE — 99999 PR PBB SHADOW E&M-EST. PATIENT-LVL III: ICD-10-PCS | Mod: PBBFAC,,, | Performed by: OBSTETRICS & GYNECOLOGY

## 2020-01-07 PROCEDURE — 90471 TDAP VACCINE GREATER THAN OR EQUAL TO 7YO IM: ICD-10-PCS | Mod: S$GLB,,, | Performed by: OBSTETRICS & GYNECOLOGY

## 2020-01-07 PROCEDURE — 90471 IMMUNIZATION ADMIN: CPT | Mod: S$GLB,,, | Performed by: OBSTETRICS & GYNECOLOGY

## 2020-01-07 PROCEDURE — 99999 PR PBB SHADOW E&M-EST. PATIENT-LVL III: CPT | Mod: PBBFAC,,, | Performed by: OBSTETRICS & GYNECOLOGY

## 2020-01-07 PROCEDURE — 0502F PR SUBSEQUENT PRENATAL CARE: ICD-10-PCS | Mod: CPTII,S$GLB,, | Performed by: OBSTETRICS & GYNECOLOGY

## 2020-01-07 PROCEDURE — 76816 OB US FOLLOW-UP PER FETUS: CPT | Mod: S$GLB,,, | Performed by: OBSTETRICS & GYNECOLOGY

## 2020-01-07 PROCEDURE — 76816 PR  US,PREGNANT UTERUS,F/U,TRANSABD APP: ICD-10-PCS | Mod: S$GLB,,, | Performed by: OBSTETRICS & GYNECOLOGY

## 2020-01-07 NOTE — PROGRESS NOTES
Here for TDAP injection. Patient without complaint of pain at this time ,Injection given. Tolerated well. No pain noted after injection.  Advised to wait in lobby 15 minutes and report any adverse reactions.     Site:  LD    Baby Friendly Handout Given to Patient

## 2020-01-07 NOTE — PROGRESS NOTES
Continue routine obstetric care  T dap order for 28 weeks   labor signs and symptoms reviewed  Speculum exam by me today reveals no supracervical bleeding. Os closed visually.  Pelvic exam reveals closed long and high cervix.  Patient counseled on fetal movements/fetal movement counts.

## 2020-01-08 ENCOUNTER — PATIENT MESSAGE (OUTPATIENT)
Dept: OBSTETRICS AND GYNECOLOGY | Facility: CLINIC | Age: 25
End: 2020-01-08

## 2020-01-10 ENCOUNTER — PATIENT MESSAGE (OUTPATIENT)
Dept: OBSTETRICS AND GYNECOLOGY | Facility: CLINIC | Age: 25
End: 2020-01-10

## 2020-01-10 ENCOUNTER — ROUTINE PRENATAL (OUTPATIENT)
Dept: OBSTETRICS AND GYNECOLOGY | Facility: CLINIC | Age: 25
End: 2020-01-10
Payer: COMMERCIAL

## 2020-01-10 VITALS — SYSTOLIC BLOOD PRESSURE: 112 MMHG | DIASTOLIC BLOOD PRESSURE: 64 MMHG | WEIGHT: 293 LBS | BODY MASS INDEX: 49.82 KG/M2

## 2020-01-10 DIAGNOSIS — T80.90XA INJECTION SITE REACTION, INITIAL ENCOUNTER: Primary | ICD-10-CM

## 2020-01-10 DIAGNOSIS — Z3A.28 28 WEEKS GESTATION OF PREGNANCY: ICD-10-CM

## 2020-01-10 DIAGNOSIS — Z34.93 PRENATAL CARE IN THIRD TRIMESTER: ICD-10-CM

## 2020-01-10 PROCEDURE — 99999 PR PBB SHADOW E&M-EST. PATIENT-LVL II: CPT | Mod: PBBFAC,,, | Performed by: NURSE PRACTITIONER

## 2020-01-10 PROCEDURE — 99213 PR OFFICE/OUTPT VISIT, EST, LEVL III, 20-29 MIN: ICD-10-PCS | Mod: S$GLB,,, | Performed by: NURSE PRACTITIONER

## 2020-01-10 PROCEDURE — 99213 OFFICE O/P EST LOW 20 MIN: CPT | Mod: S$GLB,,, | Performed by: NURSE PRACTITIONER

## 2020-01-10 PROCEDURE — 3008F PR BODY MASS INDEX (BMI) DOCUMENTED: ICD-10-PCS | Mod: CPTII,S$GLB,, | Performed by: NURSE PRACTITIONER

## 2020-01-10 PROCEDURE — 99999 PR PBB SHADOW E&M-EST. PATIENT-LVL II: ICD-10-PCS | Mod: PBBFAC,,, | Performed by: NURSE PRACTITIONER

## 2020-01-10 PROCEDURE — 3008F BODY MASS INDEX DOCD: CPT | Mod: CPTII,S$GLB,, | Performed by: NURSE PRACTITIONER

## 2020-01-10 NOTE — LETTER
January 13, 2020      [address]  [address]  [facility] [address]  Phone: [phone]  Fax: [phone]       Patient: Frankie Garcia   YOB: 1995  Date of Visit: 01/13/2020    To Whom It May Concern:    Mark Garcia  was at [facility] on 01/13/2020. She may return to work/school on 1/14/2020 with no restrictions. If you have any questions or concerns, or if I can be of further assistance, please do not hesitate to contact me.    Sincerely,      Sera Tejeda, NP

## 2020-01-13 ENCOUNTER — PATIENT MESSAGE (OUTPATIENT)
Dept: OBSTETRICS AND GYNECOLOGY | Facility: CLINIC | Age: 25
End: 2020-01-13

## 2020-01-13 ENCOUNTER — ROUTINE PRENATAL (OUTPATIENT)
Dept: OBSTETRICS AND GYNECOLOGY | Facility: CLINIC | Age: 25
End: 2020-01-13
Payer: COMMERCIAL

## 2020-01-13 VITALS — SYSTOLIC BLOOD PRESSURE: 118 MMHG | BODY MASS INDEX: 49.82 KG/M2 | WEIGHT: 293 LBS | DIASTOLIC BLOOD PRESSURE: 76 MMHG

## 2020-01-13 DIAGNOSIS — Z34.93 PRENATAL CARE IN THIRD TRIMESTER: ICD-10-CM

## 2020-01-13 DIAGNOSIS — T80.90XA INJECTION SITE REACTION, INITIAL ENCOUNTER: Primary | ICD-10-CM

## 2020-01-13 DIAGNOSIS — Z3A.29 29 WEEKS GESTATION OF PREGNANCY: ICD-10-CM

## 2020-01-13 PROCEDURE — 3008F PR BODY MASS INDEX (BMI) DOCUMENTED: ICD-10-PCS | Mod: CPTII,S$GLB,, | Performed by: NURSE PRACTITIONER

## 2020-01-13 PROCEDURE — 99213 OFFICE O/P EST LOW 20 MIN: CPT | Mod: S$GLB,,, | Performed by: NURSE PRACTITIONER

## 2020-01-13 PROCEDURE — 99999 PR PBB SHADOW E&M-EST. PATIENT-LVL II: ICD-10-PCS | Mod: PBBFAC,,, | Performed by: NURSE PRACTITIONER

## 2020-01-13 PROCEDURE — 99213 PR OFFICE/OUTPT VISIT, EST, LEVL III, 20-29 MIN: ICD-10-PCS | Mod: S$GLB,,, | Performed by: NURSE PRACTITIONER

## 2020-01-13 PROCEDURE — 99999 PR PBB SHADOW E&M-EST. PATIENT-LVL II: CPT | Mod: PBBFAC,,, | Performed by: NURSE PRACTITIONER

## 2020-01-13 PROCEDURE — 3008F BODY MASS INDEX DOCD: CPT | Mod: CPTII,S$GLB,, | Performed by: NURSE PRACTITIONER

## 2020-01-13 NOTE — PROGRESS NOTES
[facility], 28w6d, c/o pain and swelling at Tdap injection site. She received injection 4 days ago. Site is red, warm and tender to touch. Erythema extends about [id number] at injection site. She denies any fevers or chills.   +FM, denies LOF, VB or ctx  -Spoke with oncall, Dr. Castellanos, advised to ice over weekend and take tylenol. If worse go to ER.   -Follow-up scheduled for Monday in [facility]  -Reviewed  labor/bleeding/ROM precautions, FM and when to call or go to [facility]

## 2020-01-14 NOTE — PROGRESS NOTES
Pt presents [facility], 29w2d, for f/u visit. Pt was seen on Friday for Tdap injection site reaction in L upper arm. Area of erythema, swelling and tenderness has significantly decreased. She reports that it is no longer tender/painful.   +FM, denies LOF, VB or ctx.  -Reviewed  labor/bleeding/ROM precautions, FM and when to call or go to [facility]  -Next visit with TEE Mayo NP on 2020

## 2020-01-19 ENCOUNTER — PATIENT MESSAGE (OUTPATIENT)
Dept: OBSTETRICS AND GYNECOLOGY | Facility: CLINIC | Age: 25
End: 2020-01-19

## 2020-01-21 ENCOUNTER — PATIENT MESSAGE (OUTPATIENT)
Dept: OBSTETRICS AND GYNECOLOGY | Facility: CLINIC | Age: 25
End: 2020-01-21

## 2020-01-21 ENCOUNTER — ROUTINE PRENATAL (OUTPATIENT)
Dept: OBSTETRICS AND GYNECOLOGY | Facility: CLINIC | Age: 25
End: 2020-01-21
Payer: COMMERCIAL

## 2020-01-21 VITALS — WEIGHT: 293 LBS | SYSTOLIC BLOOD PRESSURE: 118 MMHG | BODY MASS INDEX: 49.6 KG/M2 | DIASTOLIC BLOOD PRESSURE: 78 MMHG

## 2020-01-21 DIAGNOSIS — Z20.828 EXPOSURE TO THE FLU: Primary | ICD-10-CM

## 2020-01-21 DIAGNOSIS — Z34.00 SUPERVISION OF NORMAL FIRST PREGNANCY, ANTEPARTUM: Primary | ICD-10-CM

## 2020-01-21 PROCEDURE — 99999 PR PBB SHADOW E&M-EST. PATIENT-LVL III: CPT | Mod: PBBFAC,,, | Performed by: NURSE PRACTITIONER

## 2020-01-21 PROCEDURE — 99999 PR PBB SHADOW E&M-EST. PATIENT-LVL III: ICD-10-PCS | Mod: PBBFAC,,, | Performed by: NURSE PRACTITIONER

## 2020-01-21 PROCEDURE — 0502F SUBSEQUENT PRENATAL CARE: CPT | Mod: CPTII,S$GLB,, | Performed by: NURSE PRACTITIONER

## 2020-01-21 PROCEDURE — 0502F PR SUBSEQUENT PRENATAL CARE: ICD-10-PCS | Mod: CPTII,S$GLB,, | Performed by: NURSE PRACTITIONER

## 2020-01-21 RX ORDER — MOMETASONE FUROATE 1 MG/G
CREAM TOPICAL
COMMUNITY
Start: 2020-01-13 | End: 2020-01-31

## 2020-01-21 RX ORDER — OSELTAMIVIR PHOSPHATE 75 MG/1
75 CAPSULE ORAL DAILY
Qty: 10 CAPSULE | Refills: 0 | Status: SHIPPED | OUTPATIENT
Start: 2020-01-21 | End: 2020-01-31

## 2020-01-21 NOTE — PROGRESS NOTES
Here for routine OB appt at 30w3d, with no complaints.  Tdap site on left upper arm swelling and tenderness is improving. Reports good FM.  Denies LOF, denies VB, denies contractions.  Reviewed warning signs of Labor and Preeclampsia.  Daily FM counts reinforced.  Discussed choosing a pediatrician.  Plans to formula feed.   MFM scan on 2/4/20 for growth/ BMI.   F/U scheduled 2 weeks

## 2020-01-24 ENCOUNTER — PATIENT MESSAGE (OUTPATIENT)
Dept: OBSTETRICS AND GYNECOLOGY | Facility: CLINIC | Age: 25
End: 2020-01-24

## 2020-01-24 ENCOUNTER — TELEPHONE (OUTPATIENT)
Dept: OBSTETRICS AND GYNECOLOGY | Facility: CLINIC | Age: 25
End: 2020-01-24

## 2020-01-24 DIAGNOSIS — B02.9 HERPES ZOSTER WITHOUT COMPLICATION: Primary | ICD-10-CM

## 2020-01-24 RX ORDER — VALACYCLOVIR HYDROCHLORIDE 1 G/1
1000 TABLET, FILM COATED ORAL 3 TIMES DAILY
Qty: 21 TABLET | Refills: 0 | Status: SHIPPED | OUTPATIENT
Start: 2020-01-24 | End: 2020-01-31

## 2020-01-24 NOTE — TELEPHONE ENCOUNTER
Called pt - discussed her rash is consistent with shingles.  Valtrex RX sent.  Discussed to avoid infants less than 1 year.  Discussed she is contagious to those who have not had the varicella vaccine or the chicken pox virus in the past until the blisters have scabbed over.  Pt verbalized understanding.

## 2020-01-24 NOTE — LETTER
January 24, 2020      [facility] [id number] [facility] [address] [facility] [address]  [address] [address]  Phone: [phone]  Fax: [phone]       Patient: Frankie Garcia   YOB: 1995  Date of Visit: 01/24/2020    To Whom It May Concern:    Mark Garcia  was at [facility] on 01/24/2020.  Please excuse her from work from today okay to return on 1/27/20 with no restrictions. If you have any questions or concerns, or if I can be of further assistance, please do not hesitate to contact me.    Sincerely,      JORDEN Omalley

## 2020-01-24 NOTE — TELEPHONE ENCOUNTER
Spoke to pt    Let pt know I forwarded her message to [facility] for review  and she would like her to submit a picture of rash for further analysis    Pt verbalised understanding

## 2020-01-24 NOTE — TELEPHONE ENCOUNTER
----- Message from Li Cuenca sent at 1/24/2020  8:44 AM CST -----  Contact: ELEUTERIO MOMIN   Name of Who is Calling: ELEUTERIO MOMIN       What is the request in detail: Patient is requesting a call back she states she has a rash on her forearm and its getting bigger it started 3 days ago       Can the clinic reply by [facility]: no      What Number to Call Back if not in [facility]: [phone]

## 2020-01-29 ENCOUNTER — TELEPHONE (OUTPATIENT)
Dept: OBSTETRICS AND GYNECOLOGY | Facility: CLINIC | Age: 25
End: 2020-01-29

## 2020-01-29 NOTE — TELEPHONE ENCOUNTER
----- Message from Galilea Mondragon sent at 1/29/2020  3:08 PM CST -----  Contact: Pt    Name of Who is Calling:ELEUTERIO MOMIN [[id number]]    What is the request in detail: patient would like to speak with Nelly only regarding her health issues she is having Please contact to further discuss and advise    Can the clinic reply by [facility]: no    What Number to Call Back if not in [facility]: [phone]

## 2020-01-30 ENCOUNTER — OFFICE VISIT (OUTPATIENT)
Dept: DERMATOLOGY | Facility: CLINIC | Age: 25
End: 2020-01-30
Payer: COMMERCIAL

## 2020-01-30 ENCOUNTER — PATIENT MESSAGE (OUTPATIENT)
Dept: OBSTETRICS AND GYNECOLOGY | Facility: CLINIC | Age: 25
End: 2020-01-30

## 2020-01-30 ENCOUNTER — TELEPHONE (OUTPATIENT)
Dept: OBSTETRICS AND GYNECOLOGY | Facility: CLINIC | Age: 25
End: 2020-01-30

## 2020-01-30 ENCOUNTER — ROUTINE PRENATAL (OUTPATIENT)
Dept: OBSTETRICS AND GYNECOLOGY | Facility: CLINIC | Age: 25
End: 2020-01-30
Payer: COMMERCIAL

## 2020-01-30 VITALS — WEIGHT: 293 LBS | SYSTOLIC BLOOD PRESSURE: 120 MMHG | BODY MASS INDEX: 49.75 KG/M2 | DIASTOLIC BLOOD PRESSURE: 70 MMHG

## 2020-01-30 DIAGNOSIS — R21 SKIN ERUPTION: ICD-10-CM

## 2020-01-30 DIAGNOSIS — B86 SCABIES: Primary | ICD-10-CM

## 2020-01-30 DIAGNOSIS — Z34.03 ENCOUNTER FOR SUPERVISION OF NORMAL FIRST PREGNANCY IN THIRD TRIMESTER: Primary | ICD-10-CM

## 2020-01-30 PROCEDURE — 0502F SUBSEQUENT PRENATAL CARE: CPT | Mod: CPTII,S$GLB,, | Performed by: OBSTETRICS & GYNECOLOGY

## 2020-01-30 PROCEDURE — 99999 PR PBB SHADOW E&M-EST. PATIENT-LVL III: ICD-10-PCS | Mod: PBBFAC,,, | Performed by: OBSTETRICS & GYNECOLOGY

## 2020-01-30 PROCEDURE — 87220 PR  TISSUE EXAM BY KOH: ICD-10-PCS | Mod: S$GLB,,, | Performed by: DERMATOLOGY

## 2020-01-30 PROCEDURE — 99999 PR PBB SHADOW E&M-EST. PATIENT-LVL II: CPT | Mod: PBBFAC,,, | Performed by: DERMATOLOGY

## 2020-01-30 PROCEDURE — 99999 PR PBB SHADOW E&M-EST. PATIENT-LVL II: ICD-10-PCS | Mod: PBBFAC,,, | Performed by: DERMATOLOGY

## 2020-01-30 PROCEDURE — 99203 OFFICE O/P NEW LOW 30 MIN: CPT | Mod: 25,S$GLB,, | Performed by: DERMATOLOGY

## 2020-01-30 PROCEDURE — 99203 PR OFFICE/OUTPT VISIT, NEW, LEVL III, 30-44 MIN: ICD-10-PCS | Mod: 25,S$GLB,, | Performed by: DERMATOLOGY

## 2020-01-30 PROCEDURE — 87220 TISSUE EXAM FOR FUNGI: CPT | Mod: S$GLB,,, | Performed by: DERMATOLOGY

## 2020-01-30 PROCEDURE — 0502F PR SUBSEQUENT PRENATAL CARE: ICD-10-PCS | Mod: CPTII,S$GLB,, | Performed by: OBSTETRICS & GYNECOLOGY

## 2020-01-30 PROCEDURE — 99999 PR PBB SHADOW E&M-EST. PATIENT-LVL III: CPT | Mod: PBBFAC,,, | Performed by: OBSTETRICS & GYNECOLOGY

## 2020-01-30 RX ORDER — PERMETHRIN 50 MG/G
CREAM TOPICAL
Qty: 60 G | Refills: 1 | Status: SHIPPED | OUTPATIENT
Start: 2020-01-30 | End: 2020-02-26

## 2020-01-30 NOTE — PATIENT INSTRUCTIONS
Scabies     To prevent spread of infection, wash clothing, linens, and toys in very hot water.     Scabies is an infection caused by very tiny mites that burrow into the skin. The mites are called Sarcoptes scabiei. They cause severe itching. Though children are most commonly infected, anyone can get scabies. Scabies mites can pass from person to person through close physical contact. They can also be passed through shared clothing, towels, and bedding. Scabies infection is not usually dangerous, but it is uncomfortable. Because it is so contagious, scabies should be treated immediately to keep the infection from spreading.  Symptoms  Symptoms of scabies appear about 2 to 6 weeks after infection in a child or adult who has never had scabies before. A child or adult who has been infected before will experience symptoms much sooner, in 1 to 4 days. Signs of scabies infection may include:  · Intense itching, especially at night or after a hot bath  · Skin irritations that look like hives, insect bites, pimples, or blisters, especially on warmer areas of the body (such as between the fingers, in the armpits, and in the creases of the wrists, elbows, and knees)  · Sores on the body caused by scratching (the sores may become infected)  · Monette created by mites traveling under the skin, which look like lines on the skins surface  Treating scabies infection  Scabies infections are usually treated with a prescription lotion that kills the mites. The lotion must be applied to the entire body from the neck down. This includes the palms of the hands, soles of the feet, groin, and under the fingernails. The lotion must be left on for 8 to 14 hours. In some cases, a second application of lotion is needed a week after the first. Medicines work quickly, but most children and adults continue to have an itchy rash for several weeks after treatment. Marks on the skin from scabies usually go away in 1 to 2 weeks, but sometimes  take a few months to clear.  Preventing spread of the infection  To prevent reinfection and the spread of scabies to others, follow these instructions:  · Wash the infected persons clothing, towels, bed linens, cloth toys, and other personal items in very hot, soapy water. Dry them thoroughly. Do not share among family members.   · Seal items that cant be washed in plastic bags for 2 weeks.  · Vacuum floors and furniture. Throw the vacuum bag away afterward.  · Notify an infected childs school and caregivers so that other children can be checked and treated.  · Keep an infected child home from  or school until the morning after treatment for scabies.  · Warn children not to share items such as clothing and towels with other children.  · You may need to treat all household members who may have been exposed to scabies, whether they show symptoms or not. Talk with your healthcare provider.  · Do not spray your house with chemicals or pesticides. These can be dangerous to your familys health.  When to call the healthcare provider if:  · The infected person has a fever, red streaks, pain, or swelling of the skin.  · Sores get worse or do not heal.  · New rashes appear or itching continues for more than 2 weeks after treatment.   Date Last Reviewed: 6/1/2016 © 2000-2017 [employer]. [address] [place] [address], [place] [address]. All rights reserved. This information is not intended as a substitute for professional medical care. Always follow your healthcare professional's instructions.

## 2020-01-30 NOTE — LETTER
January 30, 2020      Matthew Stout III, MD  [address] [facility] [address]  [facility] [address]           [facility] - Dermatology  [address]  [place] [address]  Phone: [phone]  Fax: [phone]          Patient: Frankie Garcia   MR Number: [id number]   YOB: 1995   Date of Visit: 1/30/2020       Dear Dr. Matthew Stout III:    Thank you for referring Frankie Garcia to me for evaluation. Attached you will find relevant portions of my assessment and plan of care.    If you have questions, please do not hesitate to call me. I look forward to following Frankie Garcia along with you.    Sincerely,    Karla Whitfield MD    Enclosure  CC:  No Recipients    If you would like to receive this communication electronically, please contact [email or web address] or [phone] to request more information on [employer] access.    For providers and/or their staff who would like to refer a patient to [facility], please contact us through our one-stop-shop provider referral line, [facility], at [phone].    If you feel you have received this communication in error or would no longer like to receive these types of communications, please e-mail [email or web address]

## 2020-01-30 NOTE — PROGRESS NOTES
Subjective:       Patient ID:  Frankie Garcia is a 24 y.o. female who presents for   Chief Complaint   Patient presents with    Rash     24 year old female new patient. Denies any significant PMH. No skin history.   Currently 31 weeks pregnant.   Here for concern over rash.   Started 6 months ago.  Started with itching with no rash that started soon after pregnancy noted. 10/10 intensity. Worst at night.   Noted blisters to right arm that started 1 week ago. Endorses fluid filled bumps on hands.  Was told she had hand eczema for which she was given mometasone which did not help.     Denies any history of eczema or family history of eczema.     Treatment: mometasone cream, aveeno eczema lotion, aquaphor, olay sensitive skin    Rash         Review of Systems   Skin: Positive for itching, rash and dry skin.        Objective:    Physical Exam   Constitutional: She appears well-developed and well-nourished. No distress.   Neurological: She is alert and oriented to person, place, and time. She is not disoriented.   Psychiatric: She has a normal mood and affect.   Skin:   Areas Examined (abnormalities noted in diagram):   Scalp / Hair Palpated and Inspected  Head / Face Inspection Performed  Neck Inspection Performed  Chest / Axilla Inspection Performed  Abdomen Inspection Performed  Genitals / Buttocks / Groin Inspection Performed  Back Inspection Performed  RUE Inspected  LUE Inspection Performed  RLE Inspected  LLE Inspection Performed  Nails and Digits Inspection Performed                  Diagram Legend     Erythematous scaling macule/papule c/w actinic keratosis       Vascular papule c/w angioma      Pigmented verrucoid papule/plaque c/w seborrheic keratosis      Yellow umbilicated papule c/w sebaceous hyperplasia      Irregularly shaped tan macule c/w lentigo     1-2 mm smooth white papules consistent with Milia      Movable subcutaneous cyst with punctum c/w epidermal inclusion cyst      Subcutaneous movable  cyst c/w pilar cyst      Firm pink to brown papule c/w dermatofibroma      Pedunculated fleshy papule(s) c/w skin tag(s)      Evenly pigmented macule c/w junctional nevus     Mildly variegated pigmented, slightly irregular-bordered macule c/w mildly atypical nevus      Flesh colored to evenly pigmented papule c/w intradermal nevus       Pink pearly papule/plaque c/w basal cell carcinoma      Erythematous hyperkeratotic cursted plaque c/w SCC      Surgical scar with no sign of skin cancer recurrence      Open and closed comedones      Inflammatory papules and pustules      Verrucoid papule consistent consistent with wart     Erythematous eczematous patches and plaques     Dystrophic onycholytic nail with subungual debris c/w onychomycosis     Umbilicated papule    Erythematous-base heme-crusted tan verrucoid plaque consistent with inflamed seborrheic keratosis     Erythematous Silvery Scaling Plaque c/w Psoriasis     See annotation      Assessment / Plan:        Scabies  Mineral oil prep +  -     permethrin (ELIMITE) 5 % cream; Apply neck down at night x 1 application. Wash off in a.m. Repeat in 1 week  Dispense: 60 g; Refill: 1  Insufficient safety data for ivermectin  Wash bedding/clothes in hot water. Any non-washables need to be placed in a closed plastic bag for at least 3 days.             Follow up if symptoms worsen or fail to improve.

## 2020-01-30 NOTE — PROGRESS NOTES
Patient with multiple skin lesions - not consistent with pupps.  Recommend derm follow-up.  Dermatology department contacted to evaluate patient.

## 2020-02-02 ENCOUNTER — PATIENT MESSAGE (OUTPATIENT)
Dept: OBSTETRICS AND GYNECOLOGY | Facility: CLINIC | Age: 25
End: 2020-02-02

## 2020-02-03 ENCOUNTER — TELEPHONE (OUTPATIENT)
Dept: OBSTETRICS AND GYNECOLOGY | Facility: CLINIC | Age: 25
End: 2020-02-03

## 2020-02-03 NOTE — TELEPHONE ENCOUNTER
----- Message from Gabriel Badillo sent at 2/3/2020  7:51 AM CST -----  Contact: ELEUTERIO MOMIN   Name of Who is Calling: ELEUTERIO MOMIN       What is the request in detail: Patient is requesting a call back regarding a not for her employer. Please contact to further advise.      Can the clinic reply by [facility]: NO      What Number to Call Back if not in [facility]: [phone]

## 2020-02-04 ENCOUNTER — PROCEDURE VISIT (OUTPATIENT)
Dept: MATERNAL FETAL MEDICINE | Facility: CLINIC | Age: 25
End: 2020-02-04
Payer: COMMERCIAL

## 2020-02-04 ENCOUNTER — ROUTINE PRENATAL (OUTPATIENT)
Dept: OBSTETRICS AND GYNECOLOGY | Facility: CLINIC | Age: 25
End: 2020-02-04
Payer: COMMERCIAL

## 2020-02-04 VITALS — BODY MASS INDEX: 50.33 KG/M2 | SYSTOLIC BLOOD PRESSURE: 118 MMHG | DIASTOLIC BLOOD PRESSURE: 52 MMHG | WEIGHT: 293 LBS

## 2020-02-04 DIAGNOSIS — Z34.03 ENCOUNTER FOR SUPERVISION OF NORMAL FIRST PREGNANCY IN THIRD TRIMESTER: Primary | ICD-10-CM

## 2020-02-04 DIAGNOSIS — B86 SCABIES INFESTATION: ICD-10-CM

## 2020-02-04 DIAGNOSIS — Z36.89 ENCOUNTER FOR ULTRASOUND TO ASSESS FETAL GROWTH: ICD-10-CM

## 2020-02-04 DIAGNOSIS — E66.01 MATERNAL MORBID OBESITY, ANTEPARTUM: ICD-10-CM

## 2020-02-04 DIAGNOSIS — O99.210 MATERNAL MORBID OBESITY, ANTEPARTUM: ICD-10-CM

## 2020-02-04 DIAGNOSIS — Z36.9 ENCOUNTER FOR FETAL ULTRASOUND: Primary | ICD-10-CM

## 2020-02-04 PROCEDURE — 76819 PR US, OB, FETAL BIOPHYSICAL, W/O NST: ICD-10-PCS | Mod: S$GLB,,, | Performed by: OBSTETRICS & GYNECOLOGY

## 2020-02-04 PROCEDURE — 76819 FETAL BIOPHYS PROFIL W/O NST: CPT | Mod: S$GLB,,, | Performed by: OBSTETRICS & GYNECOLOGY

## 2020-02-04 PROCEDURE — 99999 PR PBB SHADOW E&M-EST. PATIENT-LVL II: CPT | Mod: PBBFAC,,, | Performed by: OBSTETRICS & GYNECOLOGY

## 2020-02-04 PROCEDURE — 76816 PR  US,PREGNANT UTERUS,F/U,TRANSABD APP: ICD-10-PCS | Mod: S$GLB,,, | Performed by: OBSTETRICS & GYNECOLOGY

## 2020-02-04 PROCEDURE — 0502F SUBSEQUENT PRENATAL CARE: CPT | Mod: CPTII,S$GLB,, | Performed by: OBSTETRICS & GYNECOLOGY

## 2020-02-04 PROCEDURE — 76816 OB US FOLLOW-UP PER FETUS: CPT | Mod: S$GLB,,, | Performed by: OBSTETRICS & GYNECOLOGY

## 2020-02-04 PROCEDURE — 0502F PR SUBSEQUENT PRENATAL CARE: ICD-10-PCS | Mod: CPTII,S$GLB,, | Performed by: OBSTETRICS & GYNECOLOGY

## 2020-02-04 PROCEDURE — 99999 PR PBB SHADOW E&M-EST. PATIENT-LVL II: ICD-10-PCS | Mod: PBBFAC,,, | Performed by: OBSTETRICS & GYNECOLOGY

## 2020-02-04 NOTE — PROGRESS NOTES
Continue routine obstetric care  Third trimester growth sonogram performed today  Patient diagnosed with scabies.  Patient being treated with Elimite.   labor signs and symptoms reviewed

## 2020-02-12 ENCOUNTER — PATIENT MESSAGE (OUTPATIENT)
Dept: OBSTETRICS AND GYNECOLOGY | Facility: CLINIC | Age: 25
End: 2020-02-12

## 2020-02-26 ENCOUNTER — LAB VISIT (OUTPATIENT)
Dept: LAB | Facility: OTHER | Age: 25
End: 2020-02-26
Attending: NURSE PRACTITIONER
Payer: COMMERCIAL

## 2020-02-26 ENCOUNTER — ROUTINE PRENATAL (OUTPATIENT)
Dept: OBSTETRICS AND GYNECOLOGY | Facility: CLINIC | Age: 25
End: 2020-02-26
Payer: COMMERCIAL

## 2020-02-26 VITALS — WEIGHT: 293 LBS | BODY MASS INDEX: 50 KG/M2 | SYSTOLIC BLOOD PRESSURE: 108 MMHG | DIASTOLIC BLOOD PRESSURE: 64 MMHG

## 2020-02-26 DIAGNOSIS — Z34.00 SUPERVISION OF NORMAL FIRST PREGNANCY, ANTEPARTUM: Primary | ICD-10-CM

## 2020-02-26 DIAGNOSIS — R82.998 URINE LEUKOCYTES: ICD-10-CM

## 2020-02-26 DIAGNOSIS — Z34.00 SUPERVISION OF NORMAL FIRST PREGNANCY, ANTEPARTUM: ICD-10-CM

## 2020-02-26 LAB
BASOPHILS # BLD AUTO: 0.02 K/UL (ref 0–0.2)
BASOPHILS NFR BLD: 0.2 % (ref 0–1.9)
DIFFERENTIAL METHOD: ABNORMAL
EOSINOPHIL # BLD AUTO: 0 K/UL (ref 0–0.5)
EOSINOPHIL NFR BLD: 0.5 % (ref 0–8)
ERYTHROCYTE [DISTWIDTH] IN BLOOD BY AUTOMATED COUNT: 13.3 % (ref 11.5–14.5)
HCT VFR BLD AUTO: 35.4 % (ref 37–48.5)
HGB BLD-MCNC: 11.7 G/DL (ref 12–16)
IMM GRANULOCYTES # BLD AUTO: 0.03 K/UL (ref 0–0.04)
IMM GRANULOCYTES NFR BLD AUTO: 0.4 % (ref 0–0.5)
LYMPHOCYTES # BLD AUTO: 1.8 K/UL (ref 1–4.8)
LYMPHOCYTES NFR BLD: 21.4 % (ref 18–48)
MCH RBC QN AUTO: 29.7 PG (ref 27–31)
MCHC RBC AUTO-ENTMCNC: 33.1 G/DL (ref 32–36)
MCV RBC AUTO: 90 FL (ref 82–98)
MONOCYTES # BLD AUTO: 0.6 K/UL (ref 0.3–1)
MONOCYTES NFR BLD: 6.9 % (ref 4–15)
NEUTROPHILS # BLD AUTO: 6 K/UL (ref 1.8–7.7)
NEUTROPHILS NFR BLD: 70.6 % (ref 38–73)
NRBC BLD-RTO: 0 /100 WBC
PLATELET # BLD AUTO: 209 K/UL (ref 150–350)
PMV BLD AUTO: 10.1 FL (ref 9.2–12.9)
RBC # BLD AUTO: 3.94 M/UL (ref 4–5.4)
WBC # BLD AUTO: 8.46 K/UL (ref 3.9–12.7)

## 2020-02-26 PROCEDURE — 86703 HIV-1/HIV-2 1 RESULT ANTBDY: CPT

## 2020-02-26 PROCEDURE — 36415 COLL VENOUS BLD VENIPUNCTURE: CPT

## 2020-02-26 PROCEDURE — 99999 PR PBB SHADOW E&M-EST. PATIENT-LVL II: CPT | Mod: PBBFAC,,, | Performed by: NURSE PRACTITIONER

## 2020-02-26 PROCEDURE — 87081 CULTURE SCREEN ONLY: CPT | Mod: 59

## 2020-02-26 PROCEDURE — 0502F SUBSEQUENT PRENATAL CARE: CPT | Mod: CPTII,S$GLB,, | Performed by: NURSE PRACTITIONER

## 2020-02-26 PROCEDURE — 87147 CULTURE TYPE IMMUNOLOGIC: CPT

## 2020-02-26 PROCEDURE — 85025 COMPLETE CBC W/AUTO DIFF WBC: CPT

## 2020-02-26 PROCEDURE — 87086 URINE CULTURE/COLONY COUNT: CPT

## 2020-02-26 PROCEDURE — 0502F PR SUBSEQUENT PRENATAL CARE: ICD-10-PCS | Mod: CPTII,S$GLB,, | Performed by: NURSE PRACTITIONER

## 2020-02-26 PROCEDURE — 99999 PR PBB SHADOW E&M-EST. PATIENT-LVL II: ICD-10-PCS | Mod: PBBFAC,,, | Performed by: NURSE PRACTITIONER

## 2020-02-26 PROCEDURE — 86592 SYPHILIS TEST NON-TREP QUAL: CPT

## 2020-02-26 RX ORDER — NYSTATIN 100000 U/G
OINTMENT TOPICAL
Status: ON HOLD | COMMUNITY
Start: 2020-02-12 | End: 2020-03-27 | Stop reason: HOSPADM

## 2020-02-26 NOTE — PROGRESS NOTES
Here for routine OB appt at 35w4d, with complaints of SOB.  Denies any associated chest pain, Palpitations, or dizziness.  Discussed suspect physiological due to expanding uterus.  ED precautions for SOB given.  Reports scabies rash have resolved - has f/u appt with derm this week.    Reports good FM.  Denies LOF, denies VB, and reports irregular contractions.  Reviewed warning signs of Labor and Preeclampsia.  Daily FM counts reinforced.  U dip with leukocytes and blood- culture pending.   GBS and T3 labs today.   F/U scheduled 1 week

## 2020-02-27 LAB
BACTERIA UR CULT: NORMAL
BACTERIA UR CULT: NORMAL
HIV 1+2 AB+HIV1 P24 AG SERPL QL IA: NEGATIVE
RPR SER QL: NORMAL

## 2020-02-28 ENCOUNTER — PATIENT MESSAGE (OUTPATIENT)
Dept: OBSTETRICS AND GYNECOLOGY | Facility: CLINIC | Age: 25
End: 2020-02-28

## 2020-02-28 LAB — BACTERIA SPEC AEROBE CULT: ABNORMAL

## 2020-03-02 PROBLEM — B95.1 POSITIVE GBS TEST: Status: ACTIVE | Noted: 2020-03-02

## 2020-03-03 ENCOUNTER — ROUTINE PRENATAL (OUTPATIENT)
Dept: OBSTETRICS AND GYNECOLOGY | Facility: CLINIC | Age: 25
End: 2020-03-03
Payer: COMMERCIAL

## 2020-03-03 VITALS — SYSTOLIC BLOOD PRESSURE: 120 MMHG | BODY MASS INDEX: 50.26 KG/M2 | DIASTOLIC BLOOD PRESSURE: 70 MMHG | WEIGHT: 293 LBS

## 2020-03-03 DIAGNOSIS — Z22.338: ICD-10-CM

## 2020-03-03 DIAGNOSIS — Z34.03 ENCOUNTER FOR SUPERVISION OF NORMAL FIRST PREGNANCY IN THIRD TRIMESTER: Primary | ICD-10-CM

## 2020-03-03 PROCEDURE — 99999 PR PBB SHADOW E&M-EST. PATIENT-LVL II: CPT | Mod: PBBFAC,,, | Performed by: OBSTETRICS & GYNECOLOGY

## 2020-03-03 PROCEDURE — 0502F SUBSEQUENT PRENATAL CARE: CPT | Mod: CPTII,S$GLB,, | Performed by: OBSTETRICS & GYNECOLOGY

## 2020-03-03 PROCEDURE — 99999 PR PBB SHADOW E&M-EST. PATIENT-LVL II: ICD-10-PCS | Mod: PBBFAC,,, | Performed by: OBSTETRICS & GYNECOLOGY

## 2020-03-03 PROCEDURE — 0502F PR SUBSEQUENT PRENATAL CARE: ICD-10-PCS | Mod: CPTII,S$GLB,, | Performed by: OBSTETRICS & GYNECOLOGY

## 2020-03-03 NOTE — PROGRESS NOTES
Continue routine care/connected mom program  Labor instructions reviewed    Patient informed the group B beta strep culture was positive.  Patient verbalized understanding need for penicillin during labor.

## 2020-03-06 ENCOUNTER — PROCEDURE VISIT (OUTPATIENT)
Dept: MATERNAL FETAL MEDICINE | Facility: CLINIC | Age: 25
End: 2020-03-06
Payer: COMMERCIAL

## 2020-03-06 DIAGNOSIS — E66.01 CLASS 3 SEVERE OBESITY DUE TO EXCESS CALORIES WITHOUT SERIOUS COMORBIDITY WITH BODY MASS INDEX (BMI) OF 40.0 TO 44.9 IN ADULT: Chronic | ICD-10-CM

## 2020-03-06 DIAGNOSIS — Z36.9 ENCOUNTER FOR FETAL ULTRASOUND: ICD-10-CM

## 2020-03-06 PROCEDURE — 76816 OB US FOLLOW-UP PER FETUS: CPT | Mod: S$GLB,,, | Performed by: OBSTETRICS & GYNECOLOGY

## 2020-03-06 PROCEDURE — 76819 PR US, OB, FETAL BIOPHYSICAL, W/O NST: ICD-10-PCS | Mod: S$GLB,,, | Performed by: OBSTETRICS & GYNECOLOGY

## 2020-03-06 PROCEDURE — 76816 PR  US,PREGNANT UTERUS,F/U,TRANSABD APP: ICD-10-PCS | Mod: S$GLB,,, | Performed by: OBSTETRICS & GYNECOLOGY

## 2020-03-06 PROCEDURE — 76819 FETAL BIOPHYS PROFIL W/O NST: CPT | Mod: S$GLB,,, | Performed by: OBSTETRICS & GYNECOLOGY

## 2020-03-10 ENCOUNTER — PATIENT MESSAGE (OUTPATIENT)
Dept: OBSTETRICS AND GYNECOLOGY | Facility: CLINIC | Age: 25
End: 2020-03-10

## 2020-03-10 ENCOUNTER — TELEPHONE (OUTPATIENT)
Dept: OBSTETRICS AND GYNECOLOGY | Facility: CLINIC | Age: 25
End: 2020-03-10

## 2020-03-10 NOTE — TELEPHONE ENCOUNTER
Patient reports intense back pain last night, today pain scale 6 lower back discomfort. Reports increased pelvic pressure denies urinary pain or discomfort, denies vaginal leakage or bleeding, denies consistent contractions, Report BM yesterday with no issue, taking FE and Colace. Patient unable to report ton baby movement state just woke up at 1030/1100. Per Dr. Stout patient instructed to eat breakfast take Tylenol for back discomfort contact office at 1300 for update or sooner if any changes occur.

## 2020-03-11 ENCOUNTER — PATIENT MESSAGE (OUTPATIENT)
Dept: OBSTETRICS AND GYNECOLOGY | Facility: CLINIC | Age: 25
End: 2020-03-11

## 2020-03-17 ENCOUNTER — ROUTINE PRENATAL (OUTPATIENT)
Dept: OBSTETRICS AND GYNECOLOGY | Facility: CLINIC | Age: 25
End: 2020-03-17
Payer: COMMERCIAL

## 2020-03-17 VITALS — SYSTOLIC BLOOD PRESSURE: 118 MMHG | BODY MASS INDEX: 50.11 KG/M2 | DIASTOLIC BLOOD PRESSURE: 72 MMHG | WEIGHT: 293 LBS

## 2020-03-17 DIAGNOSIS — Z34.03 ENCOUNTER FOR SUPERVISION OF NORMAL FIRST PREGNANCY IN THIRD TRIMESTER: Primary | ICD-10-CM

## 2020-03-17 PROCEDURE — 0502F SUBSEQUENT PRENATAL CARE: CPT | Mod: CPTII,S$GLB,, | Performed by: OBSTETRICS & GYNECOLOGY

## 2020-03-17 PROCEDURE — 0502F PR SUBSEQUENT PRENATAL CARE: ICD-10-PCS | Mod: CPTII,S$GLB,, | Performed by: OBSTETRICS & GYNECOLOGY

## 2020-03-17 PROCEDURE — 99999 PR PBB SHADOW E&M-EST. PATIENT-LVL III: CPT | Mod: PBBFAC,,, | Performed by: OBSTETRICS & GYNECOLOGY

## 2020-03-17 PROCEDURE — 99999 PR PBB SHADOW E&M-EST. PATIENT-LVL III: ICD-10-PCS | Mod: PBBFAC,,, | Performed by: OBSTETRICS & GYNECOLOGY

## 2020-03-17 RX ORDER — TRIAMCINOLONE ACETONIDE 1 MG/G
CREAM TOPICAL
Status: ON HOLD | COMMUNITY
Start: 2020-03-03 | End: 2020-03-27 | Stop reason: HOSPADM

## 2020-03-17 NOTE — PROGRESS NOTES
Continue routine care/connected mom  Labor instructions reviewed  Patient struck to known fetal movement counts  Corona virus recommendations and labor and delivery instructions reviewed  Corona virus induction policy reviewed

## 2020-03-19 ENCOUNTER — TELEPHONE (OUTPATIENT)
Dept: ADMINISTRATIVE | Facility: CLINIC | Age: 25
End: 2020-03-19

## 2020-03-19 NOTE — TELEPHONE ENCOUNTER
Patient contacted as part of proactive OB COVID-19 hotline outreach. Currently asymptomatic. Informed about COVID-19 hotline availability. Given number, [phone], with instructions to call with any questions or new onset fever, cough or shortness of breath. No issues at present.      Eric Shuffle [facility] MS4

## 2020-03-24 ENCOUNTER — ROUTINE PRENATAL (OUTPATIENT)
Dept: OBSTETRICS AND GYNECOLOGY | Facility: CLINIC | Age: 25
End: 2020-03-24
Payer: COMMERCIAL

## 2020-03-24 VITALS — SYSTOLIC BLOOD PRESSURE: 112 MMHG | WEIGHT: 293 LBS | DIASTOLIC BLOOD PRESSURE: 78 MMHG | BODY MASS INDEX: 49.97 KG/M2

## 2020-03-24 DIAGNOSIS — Z34.03 ENCOUNTER FOR SUPERVISION OF NORMAL FIRST PREGNANCY IN THIRD TRIMESTER: Primary | ICD-10-CM

## 2020-03-24 DIAGNOSIS — O48.0 POST-TERM PREGNANCY, 40-42 WEEKS OF GESTATION: ICD-10-CM

## 2020-03-24 PROCEDURE — 99999 PR PBB SHADOW E&M-EST. PATIENT-LVL II: ICD-10-PCS | Mod: PBBFAC,,, | Performed by: OBSTETRICS & GYNECOLOGY

## 2020-03-24 PROCEDURE — 0502F PR SUBSEQUENT PRENATAL CARE: ICD-10-PCS | Mod: CPTII,S$GLB,, | Performed by: OBSTETRICS & GYNECOLOGY

## 2020-03-24 PROCEDURE — 0502F SUBSEQUENT PRENATAL CARE: CPT | Mod: CPTII,S$GLB,, | Performed by: OBSTETRICS & GYNECOLOGY

## 2020-03-24 PROCEDURE — 99999 PR PBB SHADOW E&M-EST. PATIENT-LVL II: CPT | Mod: PBBFAC,,, | Performed by: OBSTETRICS & GYNECOLOGY

## 2020-03-24 NOTE — PROGRESS NOTES
Continue routine obstetric care/connected mom.  Due to corona virus restrictions, patient instructed to perform daily fetal movement counts.  Prenatal testing after 40 weeks gestation not indicated at this time.  [facility] trial in Labor and delivery induction policy is reviewed with patient.  Patient verbalized understanding and will consider entry into trial.  Labor instructions reviewed with patient.  Induction of labor scheduled for approximately 41 weeks gestation.

## 2020-03-25 ENCOUNTER — PATIENT MESSAGE (OUTPATIENT)
Dept: OBSTETRICS AND GYNECOLOGY | Facility: CLINIC | Age: 25
End: 2020-03-25

## 2020-03-26 ENCOUNTER — HOSPITAL ENCOUNTER (INPATIENT)
Facility: OTHER | Age: 25
LOS: 1 days | Discharge: HOME | End: 2020-03-27
Attending: OBSTETRICS & GYNECOLOGY | Admitting: OBSTETRICS & GYNECOLOGY
Payer: COMMERCIAL

## 2020-03-26 ENCOUNTER — ANESTHESIA EVENT (OUTPATIENT)
Dept: OBSTETRICS AND GYNECOLOGY | Facility: OTHER | Age: 25
End: 2020-03-26
Payer: COMMERCIAL

## 2020-03-26 ENCOUNTER — ANESTHESIA (OUTPATIENT)
Dept: OBSTETRICS AND GYNECOLOGY | Facility: OTHER | Age: 25
End: 2020-03-26
Payer: COMMERCIAL

## 2020-03-26 DIAGNOSIS — Z34.90 ENCOUNTER FOR INDUCTION OF LABOR: ICD-10-CM

## 2020-03-26 LAB
ABO + RH BLD: NORMAL
BASOPHILS # BLD AUTO: 0.03 K/UL (ref 0–0.2)
BASOPHILS NFR BLD: 0.3 % (ref 0–1.9)
BLD GP AB SCN CELLS X3 SERPL QL: NORMAL
DIFFERENTIAL METHOD: NORMAL
EOSINOPHIL # BLD AUTO: 0 K/UL (ref 0–0.5)
EOSINOPHIL NFR BLD: 0.4 % (ref 0–8)
ERYTHROCYTE [DISTWIDTH] IN BLOOD BY AUTOMATED COUNT: 13.2 % (ref 11.5–14.5)
HCT VFR BLD AUTO: 37.8 % (ref 37–48.5)
HGB BLD-MCNC: 12.8 G/DL (ref 12–16)
IMM GRANULOCYTES # BLD AUTO: 0.02 K/UL (ref 0–0.04)
IMM GRANULOCYTES NFR BLD AUTO: 0.2 % (ref 0–0.5)
LYMPHOCYTES # BLD AUTO: 2.1 K/UL (ref 1–4.8)
LYMPHOCYTES NFR BLD: 21.7 % (ref 18–48)
MCH RBC QN AUTO: 29.7 PG (ref 27–31)
MCHC RBC AUTO-ENTMCNC: 33.9 G/DL (ref 32–36)
MCV RBC AUTO: 88 FL (ref 82–98)
MONOCYTES # BLD AUTO: 0.7 K/UL (ref 0.3–1)
MONOCYTES NFR BLD: 7.8 % (ref 4–15)
NEUTROPHILS # BLD AUTO: 6.6 K/UL (ref 1.8–7.7)
NEUTROPHILS NFR BLD: 69.6 % (ref 38–73)
NRBC BLD-RTO: 0 /100 WBC
PLATELET # BLD AUTO: 221 K/UL (ref 150–350)
PMV BLD AUTO: 10.3 FL (ref 9.2–12.9)
RBC # BLD AUTO: 4.31 M/UL (ref 4–5.4)
WBC # BLD AUTO: 9.49 K/UL (ref 3.9–12.7)

## 2020-03-26 PROCEDURE — 72200005 HC VAGINAL DELIVERY LEVEL II

## 2020-03-26 PROCEDURE — 59400 PR FULL ROUT OBSTE CARE,VAGINAL DELIV: ICD-10-PCS | Mod: GB,,, | Performed by: OBSTETRICS & GYNECOLOGY

## 2020-03-26 PROCEDURE — 59400 OBSTETRICAL CARE: CPT | Mod: GB,,, | Performed by: OBSTETRICS & GYNECOLOGY

## 2020-03-26 PROCEDURE — 63600175 PHARM REV CODE 636 W HCPCS: Performed by: STUDENT IN AN ORGANIZED HEALTH CARE EDUCATION/TRAINING PROGRAM

## 2020-03-26 PROCEDURE — 59409 PRA ETRICAL CARE,VAG DELIV ONLY: ICD-10-PCS | Mod: QY,,, | Performed by: ANESTHESIOLOGY

## 2020-03-26 PROCEDURE — 62326 NJX INTERLAMINAR LMBR/SAC: CPT | Performed by: ANESTHESIOLOGY

## 2020-03-26 PROCEDURE — 25000003 PHARM REV CODE 250: Performed by: ANESTHESIOLOGY

## 2020-03-26 PROCEDURE — 25000003 PHARM REV CODE 250: Performed by: STUDENT IN AN ORGANIZED HEALTH CARE EDUCATION/TRAINING PROGRAM

## 2020-03-26 PROCEDURE — 86901 BLOOD TYPING SEROLOGIC RH(D): CPT

## 2020-03-26 PROCEDURE — 85025 COMPLETE CBC W/AUTO DIFF WBC: CPT

## 2020-03-26 PROCEDURE — 51702 INSERT TEMP BLADDER CATH: CPT

## 2020-03-26 PROCEDURE — 63600175 PHARM REV CODE 636 W HCPCS: Performed by: ANESTHESIOLOGY

## 2020-03-26 PROCEDURE — 27200710 HC EPIDURAL INFUSION PUMP SET: Performed by: ANESTHESIOLOGY

## 2020-03-26 PROCEDURE — 59409 OBSTETRICAL CARE: CPT | Mod: QY,,, | Performed by: ANESTHESIOLOGY

## 2020-03-26 PROCEDURE — 72100002 HC LABOR CARE, 1ST 8 HOURS

## 2020-03-26 PROCEDURE — 11000001 HC ACUTE MED/SURG PRIVATE ROOM

## 2020-03-26 PROCEDURE — C1751 CATH, INF, PER/CENT/MIDLINE: HCPCS | Performed by: ANESTHESIOLOGY

## 2020-03-26 RX ORDER — DOCUSATE SODIUM 100 MG/1
200 CAPSULE, LIQUID FILLED ORAL 2 TIMES DAILY PRN
Status: DISCONTINUED | OUTPATIENT
Start: 2020-03-26 | End: 2020-03-27 | Stop reason: HOSPADM

## 2020-03-26 RX ORDER — FENTANYL/BUPIVACAINE/NS/PF 2MCG/ML-.1
PLASTIC BAG, INJECTION (ML) INJECTION CONTINUOUS PRN
Status: DISCONTINUED | OUTPATIENT
Start: 2020-03-26 | End: 2020-03-26

## 2020-03-26 RX ORDER — CALCIUM CARBONATE 200(500)MG
500 TABLET,CHEWABLE ORAL 3 TIMES DAILY PRN
Status: DISCONTINUED | OUTPATIENT
Start: 2020-03-26 | End: 2020-03-27

## 2020-03-26 RX ORDER — SODIUM CITRATE AND CITRIC ACID MONOHYDRATE 334; 500 MG/5ML; MG/5ML
30 SOLUTION ORAL ONCE
Status: DISCONTINUED | OUTPATIENT
Start: 2020-03-26 | End: 2020-03-27

## 2020-03-26 RX ORDER — OXYTOCIN/RINGER'S LACTATE 30/500 ML
95 PLASTIC BAG, INJECTION (ML) INTRAVENOUS ONCE
Status: DISCONTINUED | OUTPATIENT
Start: 2020-03-26 | End: 2020-03-27

## 2020-03-26 RX ORDER — DIPHENHYDRAMINE HYDROCHLORIDE 50 MG/ML
25 INJECTION INTRAMUSCULAR; INTRAVENOUS EVERY 4 HOURS PRN
Status: DISCONTINUED | OUTPATIENT
Start: 2020-03-26 | End: 2020-03-27 | Stop reason: HOSPADM

## 2020-03-26 RX ORDER — FENTANYL/BUPIVACAINE/NS/PF 2MCG/ML-.1
10 PLASTIC BAG, INJECTION (ML) INJECTION CONTINUOUS
Status: DISCONTINUED | OUTPATIENT
Start: 2020-03-26 | End: 2020-03-27

## 2020-03-26 RX ORDER — IBUPROFEN 600 MG/1
600 TABLET ORAL EVERY 6 HOURS
Status: DISCONTINUED | OUTPATIENT
Start: 2020-03-27 | End: 2020-03-27 | Stop reason: HOSPADM

## 2020-03-26 RX ORDER — SIMETHICONE 80 MG
1 TABLET,CHEWABLE ORAL EVERY 6 HOURS PRN
Status: DISCONTINUED | OUTPATIENT
Start: 2020-03-26 | End: 2020-03-27 | Stop reason: HOSPADM

## 2020-03-26 RX ORDER — ONDANSETRON 8 MG/1
8 TABLET, ORALLY DISINTEGRATING ORAL EVERY 8 HOURS PRN
Status: DISCONTINUED | OUTPATIENT
Start: 2020-03-26 | End: 2020-03-27

## 2020-03-26 RX ORDER — BUPIVACAINE HYDROCHLORIDE 2.5 MG/ML
INJECTION, SOLUTION EPIDURAL; INFILTRATION; INTRACAUDAL
Status: DISPENSED
Start: 2020-03-26 | End: 2020-03-27

## 2020-03-26 RX ORDER — FENTANYL CITRATE 50 UG/ML
INJECTION, SOLUTION INTRAMUSCULAR; INTRAVENOUS
Status: DISCONTINUED | OUTPATIENT
Start: 2020-03-26 | End: 2020-03-26

## 2020-03-26 RX ORDER — OXYTOCIN/RINGER'S LACTATE 30/500 ML
334 PLASTIC BAG, INJECTION (ML) INTRAVENOUS ONCE
Status: DISCONTINUED | OUTPATIENT
Start: 2020-03-26 | End: 2020-03-27

## 2020-03-26 RX ORDER — HYDROCORTISONE 25 MG/G
CREAM TOPICAL 3 TIMES DAILY PRN
Status: DISCONTINUED | OUTPATIENT
Start: 2020-03-26 | End: 2020-03-27 | Stop reason: HOSPADM

## 2020-03-26 RX ORDER — HYDROCODONE BITARTRATE AND ACETAMINOPHEN 5; 325 MG/1; MG/1
1 TABLET ORAL EVERY 4 HOURS PRN
Status: DISCONTINUED | OUTPATIENT
Start: 2020-03-26 | End: 2020-03-27 | Stop reason: HOSPADM

## 2020-03-26 RX ORDER — OXYTOCIN/RINGER'S LACTATE 30/500 ML
95 PLASTIC BAG, INJECTION (ML) INTRAVENOUS ONCE
Status: DISCONTINUED | OUTPATIENT
Start: 2020-03-26 | End: 2020-03-27 | Stop reason: HOSPADM

## 2020-03-26 RX ORDER — DIPHENHYDRAMINE HCL 25 MG
25 CAPSULE ORAL EVERY 4 HOURS PRN
Status: DISCONTINUED | OUTPATIENT
Start: 2020-03-26 | End: 2020-03-27 | Stop reason: HOSPADM

## 2020-03-26 RX ORDER — PRENATAL WITH FERROUS FUM AND FOLIC ACID 3080; 920; 120; 400; 22; 1.84; 3; 20; 10; 1; 12; 200; 27; 25; 2 [IU]/1; [IU]/1; MG/1; [IU]/1; MG/1; MG/1; MG/1; MG/1; MG/1; MG/1; UG/1; MG/1; MG/1; MG/1; MG/1
1 TABLET ORAL DAILY
Status: DISCONTINUED | OUTPATIENT
Start: 2020-03-27 | End: 2020-03-27 | Stop reason: HOSPADM

## 2020-03-26 RX ORDER — SIMETHICONE 80 MG
1 TABLET,CHEWABLE ORAL 4 TIMES DAILY PRN
Status: DISCONTINUED | OUTPATIENT
Start: 2020-03-26 | End: 2020-03-27

## 2020-03-26 RX ORDER — LIDOCAINE HYDROCHLORIDE AND EPINEPHRINE 15; 5 MG/ML; UG/ML
INJECTION, SOLUTION EPIDURAL
Status: DISCONTINUED | OUTPATIENT
Start: 2020-03-26 | End: 2020-03-26

## 2020-03-26 RX ORDER — SODIUM CHLORIDE 9 MG/ML
INJECTION, SOLUTION INTRAVENOUS
Status: DISCONTINUED | OUTPATIENT
Start: 2020-03-26 | End: 2020-03-27

## 2020-03-26 RX ORDER — BUPIVACAINE HYDROCHLORIDE 2.5 MG/ML
INJECTION, SOLUTION EPIDURAL; INFILTRATION; INTRACAUDAL
Status: DISPENSED
Start: 2020-03-26 | End: 2020-03-26

## 2020-03-26 RX ORDER — OXYTOCIN/RINGER'S LACTATE 30/500 ML
2 PLASTIC BAG, INJECTION (ML) INTRAVENOUS CONTINUOUS
Status: DISCONTINUED | OUTPATIENT
Start: 2020-03-26 | End: 2020-03-27

## 2020-03-26 RX ORDER — FENTANYL/BUPIVACAINE/NS/PF 2MCG/ML-.1
PLASTIC BAG, INJECTION (ML) INJECTION
Status: DISPENSED
Start: 2020-03-26 | End: 2020-03-26

## 2020-03-26 RX ORDER — MISOPROSTOL 100 MCG
50 TABLET ORAL
Status: DISCONTINUED | OUTPATIENT
Start: 2020-03-26 | End: 2020-03-27

## 2020-03-26 RX ORDER — SODIUM CHLORIDE 9 MG/ML
INJECTION, SOLUTION INTRAVENOUS CONTINUOUS
Status: DISCONTINUED | OUTPATIENT
Start: 2020-03-26 | End: 2020-03-27

## 2020-03-26 RX ORDER — HYDROCODONE BITARTRATE AND ACETAMINOPHEN 10; 325 MG/1; MG/1
1 TABLET ORAL EVERY 4 HOURS PRN
Status: DISCONTINUED | OUTPATIENT
Start: 2020-03-26 | End: 2020-03-27 | Stop reason: HOSPADM

## 2020-03-26 RX ORDER — FENTANYL CITRATE 50 UG/ML
INJECTION, SOLUTION INTRAMUSCULAR; INTRAVENOUS
Status: COMPLETED
Start: 2020-03-26 | End: 2020-03-26

## 2020-03-26 RX ORDER — SODIUM CHLORIDE, SODIUM LACTATE, POTASSIUM CHLORIDE, CALCIUM CHLORIDE 600; 310; 30; 20 MG/100ML; MG/100ML; MG/100ML; MG/100ML
INJECTION, SOLUTION INTRAVENOUS CONTINUOUS
Status: DISCONTINUED | OUTPATIENT
Start: 2020-03-26 | End: 2020-03-27

## 2020-03-26 RX ORDER — ACETAMINOPHEN 325 MG/1
650 TABLET ORAL EVERY 6 HOURS PRN
Status: DISCONTINUED | OUTPATIENT
Start: 2020-03-26 | End: 2020-03-27 | Stop reason: HOSPADM

## 2020-03-26 RX ADMIN — Medication 5 ML: at 05:03

## 2020-03-26 RX ADMIN — FENTANYL CITRATE 100 MCG: 50 INJECTION, SOLUTION INTRAMUSCULAR; INTRAVENOUS at 05:03

## 2020-03-26 RX ADMIN — DEXTROSE 3 MILLION UNITS: 50 INJECTION, SOLUTION INTRAVENOUS at 06:03

## 2020-03-26 RX ADMIN — SODIUM CHLORIDE, SODIUM LACTATE, POTASSIUM CHLORIDE, AND CALCIUM CHLORIDE 1000 ML: .6; .31; .03; .02 INJECTION, SOLUTION INTRAVENOUS at 05:03

## 2020-03-26 RX ADMIN — DEXTROSE 3 MILLION UNITS: 50 INJECTION, SOLUTION INTRAVENOUS at 11:03

## 2020-03-26 RX ADMIN — LIDOCAINE HYDROCHLORIDE,EPINEPHRINE BITARTRATE 3 ML: 15; .005 INJECTION, SOLUTION EPIDURAL; INFILTRATION; INTRACAUDAL; PERINEURAL at 05:03

## 2020-03-26 RX ADMIN — Medication 2 MILLI-UNITS/MIN: at 09:03

## 2020-03-26 RX ADMIN — MISOPROSTOL 50 MCG: 100 TABLET ORAL at 03:03

## 2020-03-26 RX ADMIN — SODIUM CHLORIDE, SODIUM LACTATE, POTASSIUM CHLORIDE, AND CALCIUM CHLORIDE: .6; .31; .03; .02 INJECTION, SOLUTION INTRAVENOUS at 11:03

## 2020-03-26 RX ADMIN — Medication 10 ML/HR: at 05:03

## 2020-03-26 RX ADMIN — PENICILLIN G POTASSIUM 5 MILLION UNITS: 5000000 INJECTION, POWDER, FOR SOLUTION INTRAMUSCULAR; INTRAVENOUS at 02:03

## 2020-03-26 RX ADMIN — ONDANSETRON 8 MG: 8 TABLET, ORALLY DISINTEGRATING ORAL at 11:03

## 2020-03-26 RX ADMIN — DEXTROSE 3 MILLION UNITS: 50 INJECTION, SOLUTION INTRAVENOUS at 03:03

## 2020-03-26 NOTE — PROGRESS NOTES
"LABOR NOTE    S:  Complaints: No.  Epidural working:  not applicable  MD to bedside as patient believes she may have SROMed.     O: /73 (BP Location: Right arm, Patient Position: Lying)   Pulse 78   Temp 97 °F (36.1 °C) (Temporal)   Resp 18   Ht 5' 5" (1.651 m)   Wt (!) 136.2 kg (300 lb 4.3 oz)   LMP 2019   Breastfeeding? No   BMI 49.97 kg/m²       FHT: 130, +accels, no decels, moderate btbv, Cat 1 (reassuring)  CTX: q 2-5 minutes  SVE: cl/thi/hi  Unclear whether SROMed given patient does not tolerate cervical exam well and unable to fully complete examination      ASSESSMENT:   24 y.o.  at 39w5d who presents for scheduled eIOL    FHT reassuring    Active Hospital Problems    Diagnosis  POA    Encounter for induction of labor [Z34.90]  Not Applicable      Resolved Hospital Problems   No resolved problems to display.     Labor course:  0300: cl/thi/hi, cytotec x1   0530: cl/thi/hi, unsure whether SROM      PLAN:  Continue Close Maternal/Fetal Monitoring  Continue cytotec for labor augmentation, currently on dose x1  Patient amenable to epidural for improved cervical examination and improvement in pain. Will notify anesthesia resident   Recheck 4 hours or PRN    Isi Zhang MD  OB/GYN  PGY-2      "

## 2020-03-26 NOTE — L&D DELIVERY NOTE
[facility]  Vaginal Delivery   Operative Note    SUMMARY     Normal spontaneous vaginal delivery of live infant after approximately 15 minutes of maternal pushing effort. Infant was placed on mother's abdomen for skin to skin and bulb suctioning performed.    Infant delivered position OA over intact perineum. Body umbilical cord was reduced following delivery.    Spontaneous delivery of placenta with gentle traction applied and IV pitocin given noting good uterine tone. A deep left sulcal laceration was noted and repaired with running locked sutures of 2-0 vicryl. A second degree laceration was also noted and repaired in the usual fashion with 2-0 vicryl suture.    Patient tolerated delivery well. Sponge, needle, and lap counted correctly x 2.    I WAS PRESENT THROUGHOUT AND SCRUBBED FOR REPAIR OF BOTH SECOND DEGREE AND LEFT SULCAL LACERATIONS    Indications: Encounter for induction of labor     Pregnancy complicated by:   Patient Active Problem List   Diagnosis    Class 3 severe obesity due to excess calories without serious comorbidity with body mass index (BMI) of 40.0 to 44.9 in adult    Pyelonephritis    Cigarette smoker    Anemia    Functional diarrhea    Hyperemesis    Scabies infestation    Positive GBS test    Beta-hemolytic Streptococcus carrier    Encounter for induction of labor    Spontaneous vaginal delivery     Admitting GA: 39w5d    Delivery Information for Lev Garcia    Birth information:  YOB: 2018   Time of birth: 4:31 PM   Sex: male   Head Delivery Date/Time: 3/26/2020  4:31 PM   Delivery type: Vaginal, Spontaneous   Gestational Age: 39w5d    Delivery Providers    Delivering clinician:  Rika Whalen MD   Provider Role    MD Kisha Justin, RN     Kylie Calvo RN             Measurements    Weight:  2863 g  Length:  47 cm  Head circumference:  34.3 cm  Chest circumference:  30.5 cm         Apgars    Living status:   Living  Apgars:   1 min.:   5 min.:   10 min.:   15 min.:   20 min.:     Skin color:   1  1       Heart rate:   2  2       Reflex irritability:   2  2       Muscle tone:   2  2       Respiratory effort:   2  2       Total:   9  9       Apgars assigned by:  KWASI CHÁVEZ RN             Shoulder Dystocia    Shoulder dystocia present?:  No           Presentation    Presentation:  Vertex           Interventions/Resuscitation    Method:  Bulb Suctioning, Tactile Stimulation       Cord    Vessels:  3 vessels  Complications:  None  Delayed Cord Clamping?:  Yes  Cord Clamped Date/Time:  3/26/2020  4:32 PM  Cord Blood Disposition:  Sent with Baby  Gases Sent?:  No       Placenta    Placenta delivery date/time:  3/26/2020 1635  Placenta removal:  Spontaneous  Placenta appearance:  Intact  Placenta disposition:  discarded           Labor Events:       labor: No     Labor Onset Date/Time:         Dilation Complete Date/Time:         Start Pushing Date/Time:         Start Pushing Date/Time:       Rupture Date/Time: 20  0500         Rupture type:           Fluid Amount:        Fluid Color: Clear      Fluid Odor:        Membrane Status: SRM (Spontaneous Rupture)               steroids: None     Antibiotics given for GBS: Yes     Induction: misoprostol;oxytocin     Indications for induction:  Elective     Augmentation:       Indications for augmentation:       Labor complications: None     Additional complications:          Cervical ripening:                     Delivery:      Episiotomy:       Indication for Episiotomy:       Perineal Lacerations: 2nd Repaired:      Periurethral Laceration:   Repaired:     Labial Laceration:   Repaired:     Sulcus Laceration: left Repaired: Yes   Vaginal Laceration:   Repaired:     Cervical Laceration:   Repaired:     Repair suture:       Repair # of packets: 2     Last Value - EBL - Nursing (mL): 250     Sum - EBL - Nursing (mL): 250     Last Value - EBL - Anesthesia (mL):       Calculated QBL (mL):        Vaginal Sweep Performed: Yes     Surgicount Correct: Yes       Other providers:       Anesthesia    Method:  Epidural          Details (if applicable):  Trial of Labor      Categorization:      Priority:     Indications for :     Incision Type:       Additional  information:  Forceps:    Vacuum:    Breech:    Observed anomalies    Other (Comments):         Jessica Carey M.D.  OB/GYN PGY-1

## 2020-03-26 NOTE — PROGRESS NOTES
"LABOR NOTE    S:  Complaints: No.  Epidural working: yes  MD to bedside for routine cervical check.    O: /72   Pulse 65   Temp 97 °F (36.1 °C) (Temporal)   Resp 18   Ht 5' 5" (1.651 m)   Wt (!) 136.2 kg (300 lb 4.3 oz)   LMP 2019   SpO2 99%   Breastfeeding? No   BMI 49.97 kg/m²     FHT: 120, - accels, no decels, moderate btbv, Cat 1 (reassuring)  CTX: q 2-5 minutes  SVE: /-3  Leaking clear fluid    ASSESSMENT:   24 y.o.  at 39w5d who presents for scheduled eIOL    FHT reassuring    Active Hospital Problems    Diagnosis  POA    Encounter for induction of labor [Z34.90]  Not Applicable      Resolved Hospital Problems   No resolved problems to display.     Labor course:  0300: cl/thi/hi, cytotec x1   0530: cl/thi/hi, unsure whether SROM  0900: 2/-3, start pit    PLAN:  Continue Close Maternal/Fetal Monitoring  Start pitocin. Augmentation per protocol  Recheck 2-4 hrs or PRN    Chris Funez MD  PGY3, OBGYN [facility]    "

## 2020-03-26 NOTE — PROGRESS NOTES
"LABOR NOTE    S:  Complaints: No.  Epidural working: yes  MD to bedside for routine cervical check.    O: /71   Pulse 60   Temp 97 °F (36.1 °C) (Temporal)   Resp 18   Ht 5' 5" (1.651 m)   Wt (!) 136.2 kg (300 lb 4.3 oz)   LMP 2019   SpO2 96%   Breastfeeding? No   BMI 49.97 kg/m²     FHT: 130, - accels, no decels, moderate btbv, Cat 1 (reassuring)  CTX: q 2 minutes  SVE: /-2    ASSESSMENT:   24 y.o.  at 39w5d who presents for scheduled eIOL    FHT reassuring    Active Hospital Problems    Diagnosis  POA    Encounter for induction of labor [Z34.90]  Not Applicable      Resolved Hospital Problems   No resolved problems to display.     Labor course:  0300: cl/thi/hi, cytotec x1   0530: cl/thi/hi, unsure whether SROM  0900: /-3, start pit  1300: /-2    PLAN:  Continue Close Maternal/Fetal Monitoring  Pitocin augmentation per protocol - pit @  10  Recheck 2 hrs or PRN    Jessica Carey M.D.  OB/GYN PGY-1  "

## 2020-03-26 NOTE — H&P
HISTORY AND PHYSICAL                                                OBSTETRICS          Subjective:       Frankie Garcia is a 24 y.o.  female with IUP at 39w5d weeks gestation who presents for IOL.    Patient denies contractions, denies vaginal bleeding, denies LOF.   Fetal Movement: normal.    This IUP is complicated by morbid obesity (BMI 50) and GBS carrier status. She also has a history of tobacco abuse.    Review of Systems   Constitutional: Negative for chills and fever.   HENT: Negative for nasal congestion.    Eyes: Negative for visual disturbance.   Respiratory: Negative for cough and shortness of breath.    Cardiovascular: Negative for chest pain and leg swelling.   Gastrointestinal: Negative for abdominal pain, nausea and vomiting.   Endocrine: Negative for diabetes.   Genitourinary: Negative for dysuria and vaginal bleeding.   Musculoskeletal: Negative for myalgias.   Integumentary:  Negative for rash.   Neurological: Negative for headaches.       PMHx:   Past Medical History:   Diagnosis Date    Anemia     Cigarette smoker     1ppd    Influenza 2019    Pyelonephritis 2019    Sepsis        PSHx:   Past Surgical History:   Procedure Laterality Date    NO PAST SURGERIES  2019       All:   Review of patient's allergies indicates:   Allergen Reactions    Ciprofloxacin Hives    Sulfamethoxazole-trimethoprim Hives       Meds:   Medications Prior to Admission   Medication Sig Dispense Refill Last Dose    ferrous sulfate (SLOW FE ORAL) Take by mouth.   Taking    nystatin (MYCOSTATIN) ointment APPLY ON SKIN 3 TIMES A DAY AS NEEDED   Not Taking    PNV no.153/FA/om3/dha/epa/fish (PRENATAL GUMMIES ORAL) Take by mouth.   Taking    promethazine (PHENERGAN) 25 MG tablet Take 25 mg by mouth.   Not Taking    triamcinolone acetonide 0.1% (KENALOG) 0.1 % cream apply on the skin twice a day as needed for rash   Not Taking       SH:   Social History     Socioeconomic History    Marital  status: Single     Spouse name: Not on file    Number of children: Not on file    Years of education: Not on file    Highest education level: Not on file   Occupational History    Not on file   Social Needs    Financial resource strain: Not on file    Food insecurity:     Worry: Not on file     Inability: Not on file    Transportation needs:     Medical: Not on file     Non-medical: Not on file   Tobacco Use    Smoking status: Former Smoker     Packs/day: 0.07     Years: 1.00     Pack years: 0.07     Types: Cigarettes     Last attempt to quit: 2019     Years since quittin.1    Smokeless tobacco: Never Used   Substance and Sexual Activity    Alcohol use: Not Currently     Alcohol/week: 0.0 standard drinks     Comment: socially, monthly     Drug use: No    Sexual activity: Not Currently     Partners: Female, Male     Birth control/protection: None   Lifestyle    Physical activity:     Days per week: Not on file     Minutes per session: Not on file    Stress: Not on file   Relationships    Social connections:     Talks on phone: Not on file     Gets together: Not on file     Attends [employer] service: Not on file     Active member of club or organization: Not on file     Attends meetings of clubs or organizations: Not on file     Relationship status: Not on file   Other Topics Concern    Are you pregnant or think you may be? Yes     Comment: 31 weeks pregnant almost 32    Breast-feeding No   Social History Narrative    Not on file       FH:   Family History   Problem Relation Age of Onset    Arthritis Father     Diabetes Maternal Aunt     Breast cancer Neg Hx     Colon cancer Neg Hx     Ovarian cancer Neg Hx        OBHx:   OB History    Para Term  AB Living   1 0 0 0 0 0   SAB TAB Ectopic Multiple Live Births   0 0 0 0 0      # Outcome Date GA Lbr Atif/2nd Weight Sex Delivery Anes PTL Lv   1 Current                Objective:       LMP 2019     There were no vitals  filed for this visit.    General:   alert, appears stated age and cooperative, no apparent distress   HENT:  normocephalic, atraumatic   Eyes:  extraocular movements and conjunctivae normal   Neck:  supple, range of motion normal, no thyromegaly   Lungs:   no respiratory distress   Heart:   regular rate   Abdomen:  soft, non-tender, non-distended but gravid, obese, no rebound or guarding    Extremities negative pitting edema, negative erythema   FHT: 120, moderate BTBV, +accels, -decels;  Cat 1 (reassuring)                 TOCO: Q 3 minutes   Presentations: cephalic by ultrasound   Cervix:     Dilation: Closed    Effacement: 50%    Station:  -3    Consistency: firm    Position: posterior     Lab Review  Blood Type O POS  GBBS: positive  Rubella: Immune  RPR: NR  HIV: negative  HepB: negative       Assessment:       39w5d weeks gestation with IOL    Active Hospital Problems    Diagnosis  POA    Encounter for induction of labor [Z34.90]  Not Applicable      Resolved Hospital Problems   No resolved problems to display.          Plan:     1. IOL   Risks, benefits, alternatives and possible complications have been discussed in detail with the patient.   - Consents signed and to chart  - Admit to Labor and Delivery unit   - Epidural per Anesthesia  - Draw CBC, T&S  - Cytotec for cervical ripening  - Recheck in 4 hrs or PRN    Post-Partum Hemorrhage risk - medium     2. GBS pos  - PCN ppx ordered    3. Morbid obesity  - will likely need lovenox ppx postpartum while admitted    Jessica Escobar MD  OB/GYN, PGY-4

## 2020-03-26 NOTE — ANESTHESIA PREPROCEDURE EVALUATION
[facility]  Anesthesia Pre-Operative Evaluation         Patient Name: Frankie Garcia  YOB: 1995  MRN: [id number]    2020      Frankie Garcia is a 24 y.o. female  @ 39w5d who presents for IOL. PMHx tobacco abuse 1ppd, morbid obesity, hx scabies 2mo ago since resolved per notes. No prior anesthesia hx, denies fmhx anesthesia complications.     OB History    Para Term  AB Living   1             SAB TAB Ectopic Multiple Live Births                  # Outcome Date GA Lbr Atif/2nd Weight Sex Delivery Anes PTL Lv   1 Current                Review of patient's allergies indicates:   Allergen Reactions    Ciprofloxacin Hives    Sulfamethoxazole-trimethoprim Hives       Wt Readings from Last 1 Encounters:   20 0310 (!) 136.2 kg (300 lb 4.3 oz)       BP Readings from Last 3 Encounters:   20 121/73   20 112/78   20 118/72       Patient Active Problem List   Diagnosis    Class 3 severe obesity due to excess calories without serious comorbidity with body mass index (BMI) of 40.0 to 44.9 in adult    Pyelonephritis    Cigarette smoker    Anemia    Functional diarrhea    Hyperemesis    Scabies infestation    Positive GBS test    Beta-hemolytic Streptococcus carrier    Encounter for induction of labor       Past Surgical History:   Procedure Laterality Date    NO PAST SURGERIES  2019       Social History     Socioeconomic History    Marital status: Single     Spouse name: Not on file    Number of children: Not on file    Years of education: Not on file    Highest education level: Not on file   Occupational History    Not on file   Social Needs    Financial resource strain: Not on file    Food insecurity:     Worry: Not on file     Inability: Not on file    Transportation needs:     Medical: Not on file     Non-medical: Not on file   Tobacco Use    Smoking status: Former Smoker     Packs/day: 0.07     Years: 1.00      Pack years: 0.07     Types: Cigarettes     Last attempt to quit: 2019     Years since quittin.1    Smokeless tobacco: Never Used   Substance and Sexual Activity    Alcohol use: Not Currently     Alcohol/week: 0.0 standard drinks     Comment: socially, monthly     Drug use: No    Sexual activity: Not Currently     Partners: Female, Male     Birth control/protection: None   Lifestyle    Physical activity:     Days per week: Not on file     Minutes per session: Not on file    Stress: Not on file   Relationships    Social connections:     Talks on phone: Not on file     Gets together: Not on file     Attends [employer] service: Not on file     Active member of club or organization: Not on file     Attends meetings of clubs or organizations: Not on file     Relationship status: Not on file   Other Topics Concern    Are you pregnant or think you may be? Yes     Comment: 31 weeks pregnant almost 32    Breast-feeding No   Social History Narrative    Not on file         Chemistry        Component Value Date/Time     2019 0252    K 3.3 (L) 2019 0252     2019 0252    CO2 23 2019 0252    BUN 21 (H) 2019 0252    CREATININE 0.9 2019 0252    GLU 96 2019 0252        Component Value Date/Time    CALCIUM 10.2 2019 0252    ALKPHOS 85 2019 0252    AST 21 2019 0252    ALT 28 2019 0252    BILITOT 0.6 2019 0252    ESTGFRAFRICA >60 2019 0252    EGFRNONAA >60 2019 0252            Lab Results   Component Value Date    WBC 9.49 2020    HGB 12.8 2020    HCT 37.8 2020    MCV 88 2020     2020       No results for input(s): PT, INR, PROTIME, APTT in the last 72 hours.          Anesthesia Evaluation    I have reviewed the Patient Summary Reports.    I have reviewed the Nursing Notes.   I have reviewed the Medications.     Review of Systems  Anesthesia Hx:  No problems with previous Anesthesia Denies  Hx of Anesthetic complications  History of prior surgery of interest to airway management or planning: Denies Family Hx of Anesthesia complications.   Denies Personal Hx of Anesthesia complications.   Social:  Non-Smoker, Former Smoker    Hematology/Oncology:  Hematology Normal        Cardiovascular:  Cardiovascular Normal  ECG has been reviewed.    Pulmonary:  Pulmonary Normal    Renal/:  Renal/ Normal     Musculoskeletal:  Musculoskeletal Normal    Neurological:  Neurology Normal    Dermatological:   Hx scabies       Physical Exam  General:  Well nourished, Morbid Obesity    Airway/Jaw/Neck:  Airway Findings: Mouth Opening: Normal Tongue: Normal  General Airway Assessment: Adult  Mallampati: III  Improves to II with phonation.  TM Distance: Normal, at least 6 cm  Jaw/Neck Findings:  Neck ROM: Normal ROM  Neck Findings:  Girth Increased     Eyes/Ears/Nose:  EYES/EARS/NOSE FINDINGS: Normal   Dental:  Dental Findings: In tact   Chest/Lungs:  Chest/Lungs Findings: Clear to auscultation     Heart/Vascular:  Heart Findings: Rate: Normal  Rhythm: Regular Rhythm  Sounds: Normal     Abdomen:  Abdomen Findings:  Normal     Musculoskeletal:  Musculoskeletal Findings:    Skin:  Skin Findings:     Mental Status:  Mental Status Findings:  Cooperative, Alert and Oriented         Anesthesia Plan  Type of Anesthesia, risks & benefits discussed:  Anesthesia Type:  epidural, general, CSE, spinal  Patient's Preference:   Intra-op Monitoring Plan: standard ASA monitors  Intra-op Monitoring Plan Comments:   Post Op Pain Control Plan: multimodal analgesia  Post Op Pain Control Plan Comments:   Induction:   rapid sequence  Beta Blocker:  Patient is not currently on a Beta-Blocker (No further documentation required).       Informed Consent: Patient understands risks and agrees with Anesthesia plan.  Questions answered. Anesthesia consent signed with patient.  ASA Score: 3     Day of Surgery Review of History & Physical:    H&P update  referred to the provider.         Ready For Surgery From Anesthesia Perspective.

## 2020-03-26 NOTE — PROGRESS NOTES
"LABOR NOTE    S:  Complaints: No.  Epidural working: yes  MD to bedside for prolonged decel    O: BP (!) 150/84   Pulse 81   Temp 97 °F (36.1 °C) (Temporal)   Resp 18   Ht 5' 5" (1.651 m)   Wt (!) 136.2 kg (300 lb 4.3 oz)   LMP 2019   SpO2 100%   Breastfeeding? No   BMI 49.97 kg/m²     FHT: 120, mod BTBV, - accels, prolonged decel to 80s which resolved with repositioning and turning off pitocin; fetus continues to have variable decels (Cat 2 status) but overall reassuring with mod BTBV  CTX: q 2 minutes  SVE: Ant lip/0 station    ASSESSMENT:   24 y.o.  at 39w5d who presents for scheduled eIOL, now in active labor    Cat 2 tracing at this time    Active Hospital Problems    Diagnosis  POA    Encounter for induction of labor [Z34.90]  Not Applicable      Resolved Hospital Problems   No resolved problems to display.     Labor course:  0300: cl/thi/hi, cytotec x1   0530: cl/thi/hi, unsure whether SROM  0900: 2/70/-3, start pit  1300: 4/80/-2  1520: Ant lip/0      PLAN:  Continue Close Maternal/Fetal Monitoring - Cat 2 tracing but with good variablity  Pitocin off for now  Anticipate likely , will likely set up to push in next 30 minutes after patient is complete    Jessica Carey M.D.  OB/GYN PGY-1  "

## 2020-03-27 VITALS
DIASTOLIC BLOOD PRESSURE: 58 MMHG | TEMPERATURE: 98 F | HEART RATE: 76 BPM | OXYGEN SATURATION: 96 % | HEIGHT: 65 IN | BODY MASS INDEX: 48.82 KG/M2 | WEIGHT: 293 LBS | SYSTOLIC BLOOD PRESSURE: 112 MMHG | RESPIRATION RATE: 18 BRPM

## 2020-03-27 LAB
BASOPHILS # BLD AUTO: 0.03 K/UL (ref 0–0.2)
BASOPHILS # BLD AUTO: 0.03 K/UL (ref 0–0.2)
BASOPHILS NFR BLD: 0.2 % (ref 0–1.9)
BASOPHILS NFR BLD: 0.2 % (ref 0–1.9)
DIFFERENTIAL METHOD: ABNORMAL
DIFFERENTIAL METHOD: ABNORMAL
EOSINOPHIL # BLD AUTO: 0.1 K/UL (ref 0–0.5)
EOSINOPHIL # BLD AUTO: 0.1 K/UL (ref 0–0.5)
EOSINOPHIL NFR BLD: 0.5 % (ref 0–8)
EOSINOPHIL NFR BLD: 0.5 % (ref 0–8)
ERYTHROCYTE [DISTWIDTH] IN BLOOD BY AUTOMATED COUNT: 13.3 % (ref 11.5–14.5)
ERYTHROCYTE [DISTWIDTH] IN BLOOD BY AUTOMATED COUNT: 13.3 % (ref 11.5–14.5)
HCT VFR BLD AUTO: 30.3 % (ref 37–48.5)
HCT VFR BLD AUTO: 30.3 % (ref 37–48.5)
HGB BLD-MCNC: 10.1 G/DL (ref 12–16)
HGB BLD-MCNC: 10.1 G/DL (ref 12–16)
IMM GRANULOCYTES # BLD AUTO: 0.05 K/UL (ref 0–0.04)
IMM GRANULOCYTES # BLD AUTO: 0.05 K/UL (ref 0–0.04)
IMM GRANULOCYTES NFR BLD AUTO: 0.4 % (ref 0–0.5)
IMM GRANULOCYTES NFR BLD AUTO: 0.4 % (ref 0–0.5)
LYMPHOCYTES # BLD AUTO: 2.1 K/UL (ref 1–4.8)
LYMPHOCYTES # BLD AUTO: 2.1 K/UL (ref 1–4.8)
LYMPHOCYTES NFR BLD: 17.4 % (ref 18–48)
LYMPHOCYTES NFR BLD: 17.4 % (ref 18–48)
MCH RBC QN AUTO: 29.8 PG (ref 27–31)
MCH RBC QN AUTO: 29.8 PG (ref 27–31)
MCHC RBC AUTO-ENTMCNC: 33.3 G/DL (ref 32–36)
MCHC RBC AUTO-ENTMCNC: 33.3 G/DL (ref 32–36)
MCV RBC AUTO: 89 FL (ref 82–98)
MCV RBC AUTO: 89 FL (ref 82–98)
MONOCYTES # BLD AUTO: 0.9 K/UL (ref 0.3–1)
MONOCYTES # BLD AUTO: 0.9 K/UL (ref 0.3–1)
MONOCYTES NFR BLD: 7.1 % (ref 4–15)
MONOCYTES NFR BLD: 7.1 % (ref 4–15)
NEUTROPHILS # BLD AUTO: 9.1 K/UL (ref 1.8–7.7)
NEUTROPHILS # BLD AUTO: 9.1 K/UL (ref 1.8–7.7)
NEUTROPHILS NFR BLD: 74.4 % (ref 38–73)
NEUTROPHILS NFR BLD: 74.4 % (ref 38–73)
NRBC BLD-RTO: 0 /100 WBC
NRBC BLD-RTO: 0 /100 WBC
PLATELET # BLD AUTO: 159 K/UL (ref 150–350)
PLATELET # BLD AUTO: 159 K/UL (ref 150–350)
PMV BLD AUTO: 9.9 FL (ref 9.2–12.9)
PMV BLD AUTO: 9.9 FL (ref 9.2–12.9)
RBC # BLD AUTO: 3.39 M/UL (ref 4–5.4)
RBC # BLD AUTO: 3.39 M/UL (ref 4–5.4)
WBC # BLD AUTO: 12.29 K/UL (ref 3.9–12.7)
WBC # BLD AUTO: 12.29 K/UL (ref 3.9–12.7)

## 2020-03-27 PROCEDURE — 36415 COLL VENOUS BLD VENIPUNCTURE: CPT

## 2020-03-27 PROCEDURE — 99024 PR POST-OP FOLLOW-UP VISIT: ICD-10-PCS | Mod: ,,, | Performed by: OBSTETRICS & GYNECOLOGY

## 2020-03-27 PROCEDURE — 25000003 PHARM REV CODE 250: Performed by: STUDENT IN AN ORGANIZED HEALTH CARE EDUCATION/TRAINING PROGRAM

## 2020-03-27 PROCEDURE — 63600175 PHARM REV CODE 636 W HCPCS: Performed by: STUDENT IN AN ORGANIZED HEALTH CARE EDUCATION/TRAINING PROGRAM

## 2020-03-27 PROCEDURE — 99024 POSTOP FOLLOW-UP VISIT: CPT | Mod: ,,, | Performed by: OBSTETRICS & GYNECOLOGY

## 2020-03-27 PROCEDURE — 85025 COMPLETE CBC W/AUTO DIFF WBC: CPT

## 2020-03-27 RX ORDER — SIMETHICONE 80 MG
80 TABLET,CHEWABLE ORAL EVERY 6 HOURS PRN
Refills: 0 | COMMUNITY
Start: 2020-03-27 | End: 2020-04-23

## 2020-03-27 RX ORDER — IBUPROFEN 600 MG/1
600 TABLET ORAL EVERY 6 HOURS PRN
Qty: 30 TABLET | Refills: 1 | Status: SHIPPED | OUTPATIENT
Start: 2020-03-27

## 2020-03-27 RX ORDER — FERROUS SULFATE 325(65) MG
325 TABLET, DELAYED RELEASE (ENTERIC COATED) ORAL DAILY
Status: DISCONTINUED | OUTPATIENT
Start: 2020-03-27 | End: 2020-03-27 | Stop reason: HOSPADM

## 2020-03-27 RX ORDER — ONDANSETRON 8 MG/1
8 TABLET, ORALLY DISINTEGRATING ORAL EVERY 8 HOURS PRN
Status: DISCONTINUED | OUTPATIENT
Start: 2020-03-27 | End: 2020-03-27 | Stop reason: HOSPADM

## 2020-03-27 RX ORDER — HYDROCODONE BITARTRATE AND ACETAMINOPHEN 5; 325 MG/1; MG/1
1 TABLET ORAL EVERY 6 HOURS PRN
Qty: 5 TABLET | Refills: 0 | Status: SHIPPED | OUTPATIENT
Start: 2020-03-27 | End: 2021-06-22

## 2020-03-27 RX ORDER — DOCUSATE SODIUM 100 MG/1
200 CAPSULE, LIQUID FILLED ORAL 2 TIMES DAILY PRN
Qty: 30 CAPSULE | Refills: 1 | Status: SHIPPED | OUTPATIENT
Start: 2020-03-27 | End: 2023-07-13

## 2020-03-27 RX ORDER — ENOXAPARIN SODIUM 100 MG/ML
40 INJECTION SUBCUTANEOUS EVERY 24 HOURS
Status: DISCONTINUED | OUTPATIENT
Start: 2020-03-27 | End: 2020-03-27 | Stop reason: HOSPADM

## 2020-03-27 RX ADMIN — IBUPROFEN 600 MG: 600 TABLET, FILM COATED ORAL at 12:03

## 2020-03-27 RX ADMIN — IBUPROFEN 600 MG: 600 TABLET, FILM COATED ORAL at 11:03

## 2020-03-27 RX ADMIN — ENOXAPARIN SODIUM 40 MG: 100 INJECTION SUBCUTANEOUS at 09:03

## 2020-03-27 RX ADMIN — IBUPROFEN 600 MG: 600 TABLET, FILM COATED ORAL at 04:03

## 2020-03-27 RX ADMIN — HYDROCODONE BITARTRATE AND ACETAMINOPHEN 1 TABLET: 5; 325 TABLET ORAL at 09:03

## 2020-03-27 RX ADMIN — IBUPROFEN 600 MG: 600 TABLET, FILM COATED ORAL at 06:03

## 2020-03-27 NOTE — ANESTHESIA POSTPROCEDURE EVALUATION
Anesthesia Post Evaluation    Patient: Frankie Garcia    Procedure(s) Performed: * No procedures listed *    Final Anesthesia Type: epidural    Patient location during evaluation: floor  Patient participation: Yes- Able to Participate  Level of consciousness: awake and alert  Post-procedure vital signs: reviewed and stable  Pain management: adequate  Airway patency: patent    PONV status at discharge: No PONV  Anesthetic complications: no      Cardiovascular status: blood pressure returned to baseline  Respiratory status: unassisted, spontaneous ventilation and room air  Hydration status: euvolemic  Follow-up not needed.          Vitals Value Taken Time   /56 3/27/2020  7:58 AM   Temp 36.6 °C (97.9 °F) 3/27/2020  7:58 AM   Pulse 60 3/27/2020  7:58 AM   Resp 18 3/27/2020  7:58 AM   SpO2 97 % 3/27/2020  7:58 AM         No case tracking events are documented in the log.      Pain/Rosemarie Score: Pain Rating Prior to Med Admin: 3 (3/27/2020 11:57 AM)  Pain Rating Post Med Admin: 0 (3/26/2020  7:05 AM)

## 2020-03-27 NOTE — LACTATION NOTE
03/27/20 1000   Infant Feeding Plan   Infant Feeding Plan formula feeding   Breastfeeding Status No   LC called pt room. Pt states that she is formula feeding infant.

## 2020-03-27 NOTE — PROGRESS NOTES
POSTPARTUM PROGRESS NOTE    Frankie Garcia is a 24 y.o. female PPD #1 status post spontaneous vaginal delivery at 39w5d in a pregnancy complicated by morbid obesity and tobacco use.    Patient is doing well this morning. She denies nausea, vomiting, fever or chills. Patient reports mild abdominal pain that is adequately relieved by oral pain medications. Lochia is mild to moderate and stable. Patient is voiding without difficulty and ambulating with no difficulty. She has passed flatus.    Patient does not plan to breast feed. Will defer to primary OB for contraception. She desires circumcision and was consented.     Objective:       Temp:  [97.9 °F (36.6 °C)-98.7 °F (37.1 °C)] 97.9 °F (36.6 °C)  Pulse:  [58-88] 60  Resp:  [16-18] 18  SpO2:  [94 %-100 %] 97 %  BP: (107-150)/(56-84) 110/56    General:   Alert, appears stated age and cooperative   Lungs:   Non-labored respirations    Heart:   Regular rate and rhythm   Abdomen:  Soft, nondistended    Uterus: Firm located at the umbilicus    Extremities: No pedal edema noted     Lab Review  No results found for this or any previous visit (from the past 4 hour(s)).    I/O    Intake/Output Summary (Last 24 hours) at 3/27/2020 1032  Last data filed at 3/26/2020 2225  Gross per 24 hour   Intake 200 ml   Output 1050 ml   Net -850 ml        Assessment:     Patient Active Problem List   Diagnosis    Class 3 severe obesity due to excess calories without serious comorbidity with body mass index (BMI) of 40.0 to 44.9 in adult    Pyelonephritis    Cigarette smoker    Anemia    Functional diarrhea    Hyperemesis    Scabies infestation    Positive GBS test    Beta-hemolytic Streptococcus carrier    Encounter for induction of labor    Spontaneous vaginal delivery        Plan:   1. Postpartum care:  - Patient doing well. Continue routine management and advances.  - Continue PO pain meds. Pain well controlled.  - Heme: H/h 12.8/37.8 >>> 10.1/30.3  - Encourage ambulation  -  Circumcision desired and consented, orders placed  - Contraception to be discussed at 6 week PP visit  - Lactation consult PRN  - Rh positive    2. Morbid obesity:  - BMI 50  - Lovenox ordered for DVT prophylaxis  - SCDs in bed  - Encourage ambulation    3. ABL anemia:  - H/h as above  - Asx  - Fe/colace      Dispo: As patient meets milestones, will plan to discharge on PPD #1-2.      Jessica Carey M.D.  OB/GYN PGY-1

## 2020-03-27 NOTE — DISCHARGE SUMMARY
Delivery Discharge Summary  Obstetrics      Primary OB Clinician: Matthew Stout IV, MD    Admission date: 3/26/2020  Discharge date: 2020    Disposition: To home, self care    Discharge Diagnosis List:      Patient Active Problem List   Diagnosis    Class 3 severe obesity due to excess calories without serious comorbidity with body mass index (BMI) of 40.0 to 44.9 in adult    Pyelonephritis    Cigarette smoker    Anemia    Functional diarrhea    Hyperemesis    Scabies infestation    Positive GBS test    Beta-hemolytic Streptococcus carrier    Encounter for induction of labor    Spontaneous vaginal delivery     Procedure:     Hospital Course:  Frankie Garcia is a 24 y.o. now , PPD #1 who was admitted on 3/26/2020 at 39w5d for elective IOL. Patient was subsequently admitted to labor and delivery unit with signed consents.     Labor course was uncomplicated and resulted in  without complications. Please see delivery note for further details.     Her postpartum course was uncomplicated. On discharge day, patient's pain is controlled with oral pain medications. Pt is tolerating ambulation without SOB or CP, and regular diet without N/V. Reports lochia is mild. Denies any HA, vision changes, F/C, LE swelling. Denies any breast pain/soreness.    Pt in stable condition and ready for discharge. She has been instructed to start and/or continue medications and follow up with her obstetrics provider as listed below.    Pertinent studies:  CBC  Recent Labs   Lab 20  0246 20  0406   WBC 9.49 12.29  12.29   HGB 12.8 10.1*  10.1*   HCT 37.8 30.3*  30.3*   MCV 88 89  89    159  159        Immunization History   Administered Date(s) Administered    DTaP 1997, 1997, 1997, 1999, 2001    HIB 1997, 1997, 1997, 07/15/1998    HPV 9-Valent 2017    Hepatitis B, Pediatric/Adolescent 1997, 1997, 1997    IPV  05/26/2001    Influenza 09/19/2019    Influenza - Quadrivalent - PF (6 months and older) 09/19/2019    MMR 07/05/1998, 07/15/1998, 05/26/2001, 11/12/2012    Meningococcal Conjugate (MCV4P) 02/08/2011, 06/01/2015    OPV 1997, 1997, 1997, 01/14/1999    Tdap 02/08/2011, 01/07/2020    Varicella 06/05/1998, 06/24/1998, 09/04/2007        Delivery:    Episiotomy:     Lacerations: 2nd   Repair suture:     Repair # of packets: 2   Blood loss (ml): 250     Birth information:  YOB: 2018   Time of birth: 4:31 PM   Sex: male   Delivery type: Vaginal, Spontaneous   Gestational Age: 39w5d    Delivery Clinician:      Other providers:       Additional  information:  Forceps:    Vacuum:    Breech:    Observed anomalies      Living?:           APGARS  One minute Five minutes Ten minutes   Skin color:         Heart rate:         Grimace:         Muscle tone:         Breathing:         Totals: 9  9        Placenta: Delivered:       appearance      Patient Instructions:   Current Discharge Medication List      START taking these medications    Details   docusate sodium (COLACE) 100 MG capsule Take 2 capsules (200 mg total) by mouth 2 (two) times daily as needed for Constipation.  Qty: 30 capsule, Refills: 1      HYDROcodone-acetaminophen (NORCO) 5-325 mg per tablet Take 1 tablet by mouth every 6 (six) hours as needed for Pain.  Qty: 5 tablet, Refills: 0    Comments: Quantity prescribed more than 7 day supply? No      ibuprofen (ADVIL,MOTRIN) 600 MG tablet Take 1 tablet (600 mg total) by mouth every 6 (six) hours as needed for Pain.  Qty: 30 tablet, Refills: 1      simethicone (MYLICON) 80 MG chewable tablet Take 1 tablet (80 mg total) by mouth every 6 (six) hours as needed for Flatulence.  Refills: 0         CONTINUE these medications which have NOT CHANGED    Details   ferrous sulfate (SLOW FE ORAL) Take by mouth.      PNV no.153/FA/om3/dha/epa/fish (PRENATAL GUMMIES ORAL) Take by mouth.          STOP taking these medications       nystatin (MYCOSTATIN) ointment Comments:   Reason for Stopping:         promethazine (PHENERGAN) 25 MG tablet Comments:   Reason for Stopping:         triamcinolone acetonide 0.1% (KENALOG) 0.1 % cream Comments:   Reason for Stopping:               Discharge Procedure Orders   Diet Adult Regular     Pelvic Rest   Order Comments: Strict pelvic rest - nothing in the vagina for 6 weeks (no sex, tampons, douching, tampons, etc.)  No driving while taking narcotic pain medication or until you feel as though you can safely slam on the brakes if necessary     Notify your health care provider if you experience any of the following:  temperature >100.4     Notify your health care provider if you experience any of the following:  persistent nausea and vomiting or diarrhea     Notify your health care provider if you experience any of the following:  severe uncontrolled pain     Notify your health care provider if you experience any of the following:  redness, tenderness, or signs of infection (pain, swelling, redness, odor or green/yellow discharge around incision site)     Notify your health care provider if you experience any of the following:  difficulty breathing or increased cough     Notify your health care provider if you experience any of the following:  severe persistent headache     Notify your health care provider if you experience any of the following:  worsening rash     Notify your health care provider if you experience any of the following:  persistent dizziness, light-headedness, or visual disturbances     Notify your health care provider if you experience any of the following:  increased confusion or weakness     Notify your health care provider if you experience any of the following:   Order Comments: Heavy vaginal bleeding saturating more than 1 pad for at least 2 hours     Activity as tolerated       Follow-up Information     Matthew Stout Iv, MD. Schedule an  appointment as soon as possible for a visit in 6 weeks.    Specialties:  Obstetrics, Obstetrics and Gynecology  Why:  Postpartum Visit  Contact information:  [address] [id number] [phone]                    Jessica Carey M.D.  OB/GYN PGY-1

## 2020-03-29 ENCOUNTER — PATIENT MESSAGE (OUTPATIENT)
Dept: OBSTETRICS AND GYNECOLOGY | Facility: CLINIC | Age: 25
End: 2020-03-29

## 2020-03-30 ENCOUNTER — HOSPITAL ENCOUNTER (EMERGENCY)
Facility: OTHER | Age: 25
Discharge: LEFT BEFORE DISCHARGE | End: 2020-03-30
Attending: OBSTETRICS & GYNECOLOGY
Payer: COMMERCIAL

## 2020-03-30 ENCOUNTER — PATIENT MESSAGE (OUTPATIENT)
Dept: OBSTETRICS AND GYNECOLOGY | Facility: CLINIC | Age: 25
End: 2020-03-30

## 2020-03-30 DIAGNOSIS — R06.02 SHORTNESS OF BREATH ON EXERTION: Primary | ICD-10-CM

## 2020-03-30 LAB
INFLUENZA A, MOLECULAR: NEGATIVE
INFLUENZA B, MOLECULAR: NEGATIVE
SPECIMEN SOURCE: NORMAL

## 2020-03-30 PROCEDURE — 99283 EMERGENCY DEPT VISIT LOW MDM: CPT

## 2020-03-30 PROCEDURE — 99281 PR EMERGENCY DEPT VISIT,LEVEL I: ICD-10-PCS | Mod: ,,, | Performed by: OBSTETRICS & GYNECOLOGY

## 2020-03-30 PROCEDURE — 87502 INFLUENZA DNA AMP PROBE: CPT

## 2020-03-30 PROCEDURE — 99281 EMR DPT VST MAYX REQ PHY/QHP: CPT | Mod: ,,, | Performed by: OBSTETRICS & GYNECOLOGY

## 2020-03-30 NOTE — ED PROVIDER NOTES
Encounter Date: 3/30/2020       History   No chief complaint on file.    23 yo  PPD #3 presents c/o shortness of breath and chest pain now resolved. She denies fever, congestion or viral symptoms. She denies abdominal pain or difficulty with feeding. Lochia is not foul smelling.        Review of patient's allergies indicates:   Allergen Reactions    Ciprofloxacin Hives    Sulfamethoxazole-trimethoprim Hives     Past Medical History:   Diagnosis Date    Anemia     Cigarette smoker     1ppd    Influenza 2019    Pyelonephritis 2019    Sepsis      Past Surgical History:   Procedure Laterality Date    NO PAST SURGERIES  2019     Family History   Problem Relation Age of Onset    Arthritis Father     Diabetes Maternal Aunt     Breast cancer Neg Hx     Colon cancer Neg Hx     Ovarian cancer Neg Hx      Social History     Tobacco Use    Smoking status: Former Smoker     Packs/day: 0.07     Years: 1.00     Pack years: 0.07     Types: Cigarettes     Last attempt to quit: 2019     Years since quittin.2    Smokeless tobacco: Never Used   Substance Use Topics    Alcohol use: Not Currently     Alcohol/week: 0.0 standard drinks     Comment: socially, monthly     Drug use: No     Review of Systems   Constitutional: Negative for fever.   HENT: Negative for sore throat.    Respiratory: Positive for shortness of breath. Negative for cough and chest tightness.    Cardiovascular: Negative for chest pain.   Gastrointestinal: Negative for abdominal distention, abdominal pain and nausea.   Genitourinary: Positive for vaginal bleeding. Negative for dysuria and vaginal discharge.   Musculoskeletal: Negative for back pain.   Skin: Negative for rash.   Neurological: Negative for weakness and headaches.   Hematological: Does not bruise/bleed easily.       Physical Exam     Initial Vitals   BP Pulse Resp Temp SpO2   -- -- -- -- --      MAP       --       Pulse 89; /70; Afebrile  Physical  Exam    ED Course   Procedures  Labs Reviewed   INFLUENZA A & B BY MOLECULAR          Imaging Results    None          Medical Decision Making:   Initial Assessment:   25 yo  PPD#3 with SOB now resolved  -patient did not stay for physical exam, elected to be discharged against medical advice, signed.  -she will call the office for a follow up visit  -precautions re COVID or endometritis                                 Clinical Impression:       ICD-10-CM ICD-9-CM   1. Shortness of breath on exertion R06.02 786.05   2. Postpartum state Z39.2 V24.2                                Aye Charlton MD  20 1828

## 2020-03-31 ENCOUNTER — TELEPHONE (OUTPATIENT)
Dept: OBSTETRICS AND GYNECOLOGY | Facility: OTHER | Age: 25
End: 2020-03-31

## 2020-03-31 NOTE — TELEPHONE ENCOUNTER
Pt reports no concerns for herself or infant. Pain controlled with ibuprofen PRN. Reminded to make pp appt, verified she has contact info. Infant has been to see peds, continues to FF. D/C 10/10.

## 2020-04-08 ENCOUNTER — PATIENT MESSAGE (OUTPATIENT)
Dept: OBSTETRICS AND GYNECOLOGY | Facility: CLINIC | Age: 25
End: 2020-04-08

## 2020-04-14 ENCOUNTER — PATIENT MESSAGE (OUTPATIENT)
Dept: OBSTETRICS AND GYNECOLOGY | Facility: CLINIC | Age: 25
End: 2020-04-14

## 2020-04-14 NOTE — PHYSICIAN QUERY
PT Name: Frankie Garcia  MR #: [id number]     Physician Query Form - Documentation Clarification      CDS/: XIOMARA Cedeno,[email or web address]        Contact information:[email or web address]    This form is a permanent document in the medical record.     Query Date: April 14, 2020    By submitting this query, we are merely seeking further clarification of documentation. Please utilize your independent clinical judgment when addressing the question(s) below.    The Medical Record reflects the following:    Clinical Findings Location in Medical Record   ABL anemia  - H/h as above  - Asx  - Fe/colace    Heme: H/h 12.8/37.8 >>> 10.1/30.3    status post spontaneous vaginal delivery at 39w5d in a pregnancy complicated by morbid obesity and tobacco use    Sum - EBL - Nursing (mL):250    Her postpartum course was uncomplicated    CONTINUE these medications which have NOT CHANGED  ferrous sulfate (SLOW FE ORAL) OB Progress note 3/27@1222pm                        L&D Delivery note 3/27    Discharge Summary 3/28                                                                                      Please clarify the diagnosis of ABL anemia      Provider Use Only  [X] Diagnosis ruled in and additional clinical support/ decision making indicators for the diagnosis include (specify): Lab values showing drop from normal prior to delivery to anemic range after delivery.  [  ]  Above stated diagnosis has been ruled out  [  ]  Above stated diagnosis has been ruled out, other diagnosis ruled in (specify):___________  [  ] Other clarification (specify): ______________  [  ] Clinically undetermined       Present on admission (POA) status:   [   ] Yes (Y)                          [  ] Clinically Undetermined (W)  [   ] No (N)                            [   ] Documentation insufficient to determine if condition is POA (U)

## 2020-04-22 ENCOUNTER — PATIENT MESSAGE (OUTPATIENT)
Dept: OBSTETRICS AND GYNECOLOGY | Facility: CLINIC | Age: 25
End: 2020-04-22

## 2020-04-23 ENCOUNTER — POSTPARTUM VISIT (OUTPATIENT)
Dept: OBSTETRICS AND GYNECOLOGY | Facility: CLINIC | Age: 25
End: 2020-04-23
Payer: COMMERCIAL

## 2020-04-23 VITALS
BODY MASS INDEX: 45.22 KG/M2 | SYSTOLIC BLOOD PRESSURE: 116 MMHG | HEIGHT: 65 IN | WEIGHT: 271.38 LBS | DIASTOLIC BLOOD PRESSURE: 72 MMHG

## 2020-04-23 PROBLEM — B86 SCABIES INFESTATION: Status: RESOLVED | Noted: 2020-02-04 | Resolved: 2020-04-23

## 2020-04-23 PROBLEM — Z34.90 ENCOUNTER FOR INDUCTION OF LABOR: Status: RESOLVED | Noted: 2020-03-26 | Resolved: 2020-04-23

## 2020-04-23 PROBLEM — Z22.338 BETA-HEMOLYTIC STREPTOCOCCUS CARRIER: Status: RESOLVED | Noted: 2020-03-03 | Resolved: 2020-04-23

## 2020-04-23 PROBLEM — N12 PYELONEPHRITIS: Status: RESOLVED | Noted: 2019-02-18 | Resolved: 2020-04-23

## 2020-04-23 PROCEDURE — 99999 PR PBB SHADOW E&M-EST. PATIENT-LVL III: CPT | Mod: PBBFAC,,, | Performed by: OBSTETRICS & GYNECOLOGY

## 2020-04-23 PROCEDURE — 0503F PR POSTPARTUM CARE VISIT: ICD-10-PCS | Mod: S$GLB,,, | Performed by: OBSTETRICS & GYNECOLOGY

## 2020-04-23 PROCEDURE — 99999 PR PBB SHADOW E&M-EST. PATIENT-LVL III: ICD-10-PCS | Mod: PBBFAC,,, | Performed by: OBSTETRICS & GYNECOLOGY

## 2020-04-23 PROCEDURE — 0503F POSTPARTUM CARE VISIT: CPT | Mod: S$GLB,,, | Performed by: OBSTETRICS & GYNECOLOGY

## 2020-04-23 NOTE — PROGRESS NOTES
"CC: Post-partum follow-up    Frankie Garcia is a 24 y.o. female  who presents for post-partum visit.  She is S/P a spontaneous vaginal delivery.  She and the baby are doing well.  No pain.  No fever.   No bowel / bladder complaints.    Delivery Date:  2020  Delivery MD:MEHDI Whalen MD  Gender: male  Breast Feeding: NO - bottle feeding   Depression: NO  Contraception: no method.  Patient considering oral contraceptive pill    /72   Ht 5' 5" (1.651 m)   Wt 123.1 kg (271 lb 6.2 oz)   LMP 2019   Breastfeeding? No   BMI 45.16 kg/m²     ROS:  GENERAL: No fever, chills, fatigability.  VULVAR: No pain, no lesions and no itching.  VAGINAL: No relaxation, no itching, no discharge, no abnormal bleeding and no lesions.  ABDOMEN: No abdominal pain. Denies nausea. Denies vomiting. No diarrhea. No constipation  BREAST: Denies pain. No lumps.  URINARY: No incontinence, no nocturia, no frequency and no dysuria.  CARDIOVASCULAR: No chest pain. No shortness of breath. No leg cramps.  NEUROLOGICAL: No headaches. No vision changes.    PHYSICAL EXAM:  Exam chaperoned by nurse  ABDOMEN:  Soft, non-tender, non-distended  VULVA:  Normal, no lesions.  Vaginal laceration healing well with sutures still in place (right sulcus).  CERVIX:  Without lesions, polyps or tenderness.  Scant lochia alba.  UTERUS:  Normal size, shape, consistency, no mass or tenderness.  ADNEXA:  Normal in size without mass or tenderness    IMP:  Doing well S/P spontaneous vaginal delivery  Instructions / precautions reviewed  Contraceptive counseling    Rx:  None today    PLAN:  May resume normal activities  Return:  Annual exam in 1 year.  Last Pap smear 2019 (within normal limits).    Patient considering oral contraceptive pills.  Risk and benefits reviewed.  Patient verbalized understanding and will contact office if OCPs desired.    Patient declines other contraceptive options today.  Matthew Stout IV, MD  "

## 2020-04-27 ENCOUNTER — PATIENT MESSAGE (OUTPATIENT)
Dept: OBSTETRICS AND GYNECOLOGY | Facility: CLINIC | Age: 25
End: 2020-04-27

## 2020-04-29 ENCOUNTER — CLINICAL SUPPORT (OUTPATIENT)
Dept: OBSTETRICS AND GYNECOLOGY | Facility: CLINIC | Age: 25
End: 2020-04-29
Payer: COMMERCIAL

## 2020-04-29 DIAGNOSIS — Z30.9 ENCOUNTER FOR CONTRACEPTIVE MANAGEMENT, UNSPECIFIED TYPE: Primary | ICD-10-CM

## 2020-04-29 DIAGNOSIS — Z30.42 ON DEPO-PROVERA FOR CONTRACEPTION: Primary | ICD-10-CM

## 2020-04-29 LAB
B-HCG UR QL: NEGATIVE
CTP QC/QA: YES

## 2020-04-29 PROCEDURE — 99999 PR PBB SHADOW E&M-EST. PATIENT-LVL I: ICD-10-PCS | Mod: PBBFAC,,,

## 2020-04-29 PROCEDURE — 81025 URINE PREGNANCY TEST: CPT | Mod: S$GLB,,, | Performed by: OBSTETRICS & GYNECOLOGY

## 2020-04-29 PROCEDURE — 99999 PR PBB SHADOW E&M-EST. PATIENT-LVL I: CPT | Mod: PBBFAC,,,

## 2020-04-29 PROCEDURE — 81025 POCT URINE PREGNANCY: ICD-10-PCS | Mod: S$GLB,,, | Performed by: OBSTETRICS & GYNECOLOGY

## 2020-04-29 PROCEDURE — 96372 THER/PROPH/DIAG INJ SC/IM: CPT | Mod: S$GLB,,, | Performed by: OBSTETRICS & GYNECOLOGY

## 2020-04-29 PROCEDURE — 96372 PR INJECTION,THERAP/PROPH/DIAG2ST, IM OR SUBCUT: ICD-10-PCS | Mod: S$GLB,,, | Performed by: OBSTETRICS & GYNECOLOGY

## 2020-04-29 RX ORDER — MEDROXYPROGESTERONE ACETATE 150 MG/ML
150 INJECTION, SUSPENSION INTRAMUSCULAR
Status: SHIPPED | OUTPATIENT
Start: 2020-04-29 | End: 2021-01-24

## 2020-04-29 RX ORDER — MEDROXYPROGESTERONE ACETATE 150 MG/ML
150 INJECTION, SUSPENSION INTRAMUSCULAR ONCE
Qty: 1 ML | Refills: 0 | Status: SHIPPED | OUTPATIENT
Start: 2020-04-29 | End: 2021-06-22

## 2020-04-29 RX ADMIN — MEDROXYPROGESTERONE ACETATE 150 MG: 150 INJECTION, SUSPENSION INTRAMUSCULAR at 12:04

## 2020-04-29 NOTE — PROGRESS NOTES
Pregnancy test needed to be performed.Patient is here for 1st Depo Provera Injection, UPT obtained with NEGATIVE results . Patient with no current complaints of pain prior to or after injection. Advised to wait 5 minutes. Return between 07/15/2020 - 07/29/2020 for next injection.      PATIENT SUPPLIED MEDICATION.    See medication Card for RX information    Order verified, inspected package,storage verified,expiration verified    APPOINTMENT MADE FOR NEXT INJECTION    Site - RB

## 2020-05-07 ENCOUNTER — PATIENT MESSAGE (OUTPATIENT)
Dept: OBSTETRICS AND GYNECOLOGY | Facility: CLINIC | Age: 25
End: 2020-05-07

## 2020-08-14 DIAGNOSIS — Z11.59 NEED FOR HEPATITIS C SCREENING TEST: ICD-10-CM

## 2020-10-01 ENCOUNTER — PATIENT MESSAGE (OUTPATIENT)
Dept: OBSTETRICS AND GYNECOLOGY | Facility: CLINIC | Age: 25
End: 2020-10-01

## 2020-10-05 ENCOUNTER — PATIENT MESSAGE (OUTPATIENT)
Dept: ADMINISTRATIVE | Facility: HOSPITAL | Age: 25
End: 2020-10-05

## 2020-10-12 ENCOUNTER — OFFICE VISIT (OUTPATIENT)
Dept: FAMILY MEDICINE | Facility: CLINIC | Age: 25
End: 2020-10-12
Payer: COMMERCIAL

## 2020-10-12 ENCOUNTER — PATIENT MESSAGE (OUTPATIENT)
Dept: FAMILY MEDICINE | Facility: CLINIC | Age: 25
End: 2020-10-12

## 2020-10-12 VITALS
DIASTOLIC BLOOD PRESSURE: 68 MMHG | OXYGEN SATURATION: 98 % | HEIGHT: 65 IN | HEART RATE: 87 BPM | BODY MASS INDEX: 48.82 KG/M2 | WEIGHT: 293 LBS | TEMPERATURE: 99 F | SYSTOLIC BLOOD PRESSURE: 110 MMHG

## 2020-10-12 DIAGNOSIS — R35.0 INCREASED URINARY FREQUENCY: ICD-10-CM

## 2020-10-12 DIAGNOSIS — Z23 NEED FOR HPV VACCINATION: ICD-10-CM

## 2020-10-12 DIAGNOSIS — Z00.00 ANNUAL PHYSICAL EXAM: Primary | ICD-10-CM

## 2020-10-12 DIAGNOSIS — Z11.8 ENCOUNTER FOR SCREENING EXAMINATION FOR CHLAMYDIAL INFECTION: ICD-10-CM

## 2020-10-12 DIAGNOSIS — Z23 NEEDS FLU SHOT: ICD-10-CM

## 2020-10-12 PROBLEM — E66.813 CLASS 3 SEVERE OBESITY DUE TO EXCESS CALORIES WITHOUT SERIOUS COMORBIDITY WITH BODY MASS INDEX (BMI) OF 40.0 TO 44.9 IN ADULT: Chronic | Status: RESOLVED | Noted: 2017-02-24 | Resolved: 2020-10-12

## 2020-10-12 PROBLEM — E66.01 CLASS 3 SEVERE OBESITY DUE TO EXCESS CALORIES WITHOUT SERIOUS COMORBIDITY WITH BODY MASS INDEX (BMI) OF 40.0 TO 44.9 IN ADULT: Chronic | Status: RESOLVED | Noted: 2017-02-24 | Resolved: 2020-10-12

## 2020-10-12 LAB
AMORPH CRY URNS QL MICRO: ABNORMAL
BACTERIA #/AREA URNS HPF: ABNORMAL /HPF
BILIRUB UR QL STRIP: NEGATIVE
CLARITY UR: ABNORMAL
COLOR UR: YELLOW
GLUCOSE UR QL STRIP: NEGATIVE
HGB UR QL STRIP: NEGATIVE
HYALINE CASTS #/AREA URNS LPF: 0 /LPF
KETONES UR QL STRIP: NEGATIVE
LEUKOCYTE ESTERASE UR QL STRIP: ABNORMAL
MICROSCOPIC COMMENT: ABNORMAL
NITRITE UR QL STRIP: NEGATIVE
PH UR STRIP: 7 [PH] (ref 5–8)
PROT UR QL STRIP: ABNORMAL
RBC #/AREA URNS HPF: 0 /HPF (ref 0–4)
SP GR UR STRIP: 1.02 (ref 1–1.03)
SQUAMOUS #/AREA URNS HPF: 1 /HPF
URN SPEC COLLECT METH UR: ABNORMAL
UROBILINOGEN UR STRIP-ACNC: NEGATIVE EU/DL
WBC #/AREA URNS HPF: 5 /HPF (ref 0–5)

## 2020-10-12 PROCEDURE — 87186 SC STD MICRODIL/AGAR DIL: CPT

## 2020-10-12 PROCEDURE — 87077 CULTURE AEROBIC IDENTIFY: CPT

## 2020-10-12 PROCEDURE — 99385 PR PREVENTIVE VISIT,NEW,18-39: ICD-10-PCS | Mod: 25,S$GLB,, | Performed by: INTERNAL MEDICINE

## 2020-10-12 PROCEDURE — 87088 URINE BACTERIA CULTURE: CPT

## 2020-10-12 PROCEDURE — 87491 CHLMYD TRACH DNA AMP PROBE: CPT

## 2020-10-12 PROCEDURE — 81000 URINALYSIS NONAUTO W/SCOPE: CPT

## 2020-10-12 PROCEDURE — 90651 9VHPV VACCINE 2/3 DOSE IM: CPT | Mod: S$GLB,,, | Performed by: INTERNAL MEDICINE

## 2020-10-12 PROCEDURE — 90686 IIV4 VACC NO PRSV 0.5 ML IM: CPT | Mod: S$GLB,,, | Performed by: INTERNAL MEDICINE

## 2020-10-12 PROCEDURE — 90472 IMMUNIZATION ADMIN EACH ADD: CPT | Mod: S$GLB,,, | Performed by: INTERNAL MEDICINE

## 2020-10-12 PROCEDURE — 90472 HPV VACCINE 9-VALENT 3 DOSE IM: ICD-10-PCS | Mod: S$GLB,,, | Performed by: INTERNAL MEDICINE

## 2020-10-12 PROCEDURE — 90686 FLU VACCINE (QUAD) GREATER THAN OR EQUAL TO 3YO PRESERVATIVE FREE IM: ICD-10-PCS | Mod: S$GLB,,, | Performed by: INTERNAL MEDICINE

## 2020-10-12 PROCEDURE — 90651 HPV VACCINE 9-VALENT 3 DOSE IM: ICD-10-PCS | Mod: S$GLB,,, | Performed by: INTERNAL MEDICINE

## 2020-10-12 PROCEDURE — 90471 FLU VACCINE (QUAD) GREATER THAN OR EQUAL TO 3YO PRESERVATIVE FREE IM: ICD-10-PCS | Mod: S$GLB,,, | Performed by: INTERNAL MEDICINE

## 2020-10-12 PROCEDURE — 87086 URINE CULTURE/COLONY COUNT: CPT

## 2020-10-12 PROCEDURE — 99385 PREV VISIT NEW AGE 18-39: CPT | Mod: 25,S$GLB,, | Performed by: INTERNAL MEDICINE

## 2020-10-12 PROCEDURE — 99999 PR PBB SHADOW E&M-EST. PATIENT-LVL III: CPT | Mod: PBBFAC,,, | Performed by: INTERNAL MEDICINE

## 2020-10-12 PROCEDURE — 99999 PR PBB SHADOW E&M-EST. PATIENT-LVL III: ICD-10-PCS | Mod: PBBFAC,,, | Performed by: INTERNAL MEDICINE

## 2020-10-12 PROCEDURE — 90471 IMMUNIZATION ADMIN: CPT | Mod: S$GLB,,, | Performed by: INTERNAL MEDICINE

## 2020-10-12 RX ORDER — CEFUROXIME AXETIL 500 MG/1
500 TABLET ORAL
COMMUNITY
End: 2020-10-15

## 2020-10-12 NOTE — LETTER
October 13, 2020      [place] [address] [address] [place] [address]  Phone: [phone]  Fax: [phone]       Patient: Frankie Garcia   YOB: 1995  Date of Visit: 10/12/2020    To Whom It May Concern:    Mark Garcia  was at [facility] on 10/12/2020. She may return to work/school on 10/13/2020 with no restrictions. If you have any questions or concerns, or if I can be of further assistance, please do not hesitate to contact me.    Sincerely,    Marquita Go MD

## 2020-10-12 NOTE — PROGRESS NOTES
SUBJECTIVE     Chief Complaint   Patient presents with    Annual Exam     blisters on arm       HPI  Frankie Garcia is a 25 y.o. female with multiple medical diagnoses as listed in the medical history and problem list that presents for annual exam. Pt has been doing well since her last visit. She has a good appetite and eats well. She does not exercise. She sleeps for ~6 hours nightly. Pt does not take OTC supplements. She does have any current stressors, but does not have an outlet. Pt is UTD on age appropriate CA screening.    PAST MEDICAL HISTORY:  Past Medical History:   Diagnosis Date    Anemia     Cigarette smoker     1ppd    Influenza 2019    Pyelonephritis 2019    Sepsis        PAST SURGICAL HISTORY:  Past Surgical History:   Procedure Laterality Date    NO PAST SURGERIES  2019       SOCIAL HISTORY:  Social History     Socioeconomic History    Marital status: Single     Spouse name: Not on file    Number of children: Not on file    Years of education: Not on file    Highest education level: Not on file   Occupational History    Not on file   Social Needs    Financial resource strain: Not on file    Food insecurity     Worry: Not on file     Inability: Not on file    Transportation needs     Medical: Not on file     Non-medical: Not on file   Tobacco Use    Smoking status: Former Smoker     Packs/day: 0.07     Years: 1.00     Pack years: 0.07     Types: Cigarettes     Quit date: 2019     Years since quittin.7    Smokeless tobacco: Never Used   Substance and Sexual Activity    Alcohol use: Not Currently     Alcohol/week: 0.0 standard drinks     Comment: socially, monthly     Drug use: No    Sexual activity: Not Currently     Partners: Female, Male     Birth control/protection: None   Lifestyle    Physical activity     Days per week: Not on file     Minutes per session: Not on file    Stress: Not on file   Relationships    Social connections     Talks on phone:  Not on file     Gets together: Not on file     Attends Holiness service: Not on file     Active member of club or organization: Not on file     Attends meetings of clubs or organizations: Not on file     Relationship status: Not on file   Other Topics Concern    Are you pregnant or think you may be? Yes     Comment: 31 weeks pregnant almost 34    Breast-feeding No   Social History Narrative    Not on file       FAMILY HISTORY:  Family History   Problem Relation Age of Onset    Arthritis Father     Diabetes Maternal Aunt     Breast cancer Neg Hx     Colon cancer Neg Hx     Ovarian cancer Neg Hx        ALLERGIES AND MEDICATIONS: updated and reviewed.  Review of patient's allergies indicates:   Allergen Reactions    Ciprofloxacin Hives    Sulfamethoxazole-trimethoprim Hives     Current Outpatient Medications   Medication Sig Dispense Refill    cefUROXime (CEFTIN) 500 MG tablet Take 500 mg by mouth every 12 (twelve) hours.      docusate sodium (COLACE) 100 MG capsule Take 2 capsules (200 mg total) by mouth 2 (two) times daily as needed for Constipation. 30 capsule 1    ferrous sulfate (SLOW FE ORAL) Take by mouth.      HYDROcodone-acetaminophen (NORCO) 5-325 mg per tablet Take 1 tablet by mouth every 6 (six) hours as needed for Pain. 5 tablet 0    ibuprofen (ADVIL,MOTRIN) 600 MG tablet Take 1 tablet (600 mg total) by mouth every 6 (six) hours as needed for Pain. 30 tablet 1    medroxyPROGESTERone (DEPO-PROVERA) 150 mg/mL Syrg Inject 1 mL (150 mg total) into the muscle once. for 1 dose 1 mL 0    PNV no.153/FA/om3/dha/epa/fish (PRENATAL GUMMIES ORAL) Take by mouth.       Current Facility-Administered Medications   Medication Dose Route Frequency Provider Last Rate Last Dose    medroxyPROGESTERone (DEPO-PROVERA) injection 150 mg  150 mg Intramuscular Q90 Days Matthew Stout IV, MD   150 mg at 04/29/20 1209       ROS  Review of Systems   Constitutional: Negative for chills and fever.   HENT: Negative  "for hearing loss and sore throat.    Eyes: Negative for visual disturbance.   Respiratory: Negative for cough and shortness of breath.    Cardiovascular: Negative for chest pain, palpitations and leg swelling.   Gastrointestinal: Negative for abdominal pain, constipation, diarrhea, nausea and vomiting.   Genitourinary: Positive for frequency. Negative for dysuria and urgency.   Musculoskeletal: Negative for arthralgias, joint swelling and myalgias.   Skin: Positive for rash (R arm and R breast). Negative for wound.   Neurological: Negative for headaches.   Psychiatric/Behavioral: Negative for agitation and confusion. The patient is not nervous/anxious.          OBJECTIVE     Physical Exam  Vitals:    10/12/20 1605   BP: 110/68   Pulse: 87   Temp: 98.8 °F (37.1 °C)    Body mass index is 48.79 kg/m².  Weight: 133 kg (293 lb 3.4 oz)   Height: 5' 5" (165.1 cm)     Physical Exam  Constitutional:       General: She is not in acute distress.     Appearance: She is well-developed.   HENT:      Head: Normocephalic and atraumatic.      Right Ear: External ear normal.      Left Ear: External ear normal.      Nose: Nose normal.   Eyes:      General: No scleral icterus.        Right eye: No discharge.         Left eye: No discharge.      Conjunctiva/sclera: Conjunctivae normal.   Neck:      Musculoskeletal: Normal range of motion and neck supple.      Vascular: No JVD.      Trachea: No tracheal deviation.   Cardiovascular:      Rate and Rhythm: Normal rate and regular rhythm.      Heart sounds: No murmur. No friction rub. No gallop.    Pulmonary:      Effort: Pulmonary effort is normal. No respiratory distress.      Breath sounds: Normal breath sounds. No wheezing.   Abdominal:      General: Bowel sounds are normal. There is no distension.      Palpations: Abdomen is soft. There is no mass.      Tenderness: There is no abdominal tenderness. There is no guarding or rebound.   Musculoskeletal: Normal range of motion.         " General: No tenderness or deformity.   Skin:     General: Skin is warm and dry.      Findings: No erythema or rash.   Neurological:      Mental Status: She is alert and oriented to person, place, and time.      Motor: No abnormal muscle tone.      Coordination: Coordination normal.   Psychiatric:         Behavior: Behavior normal.         Thought Content: Thought content normal.         Judgment: Judgment normal.           Health Maintenance       Date Due Completion Date    Hepatitis C Screening 1995 ---    HPV Vaccines (2 - 3-dose series) 08/03/2017 7/6/2017    Influenza Vaccine (1) 08/01/2020 9/19/2019    Chlamydia Screening 08/20/2020 8/20/2019    Pap Smear 03/15/2022 3/15/2019    TETANUS VACCINE 01/07/2030 1/7/2020            ASSESSMENT     25 y.o. female with     1. Annual physical exam    2. Increased urinary frequency    3. Encounter for screening examination for chlamydial infection    4. Need for HPV vaccination    5. Needs flu shot    6. BMI 45.0-49.9, adult        PLAN:     1. Annual physical exam  - Counseled on age appropriate medical preventative services, including age appropriate cancer screenings, over all nutritional health, need for a consistent exercise regimen and an over all push towards maintaining a vigorous and active lifestyle.  Counseled on age appropriate vaccines and discussed upcoming health care needs based on age/gender.  Spent time with patient counseling on need for a good patient/doctor relationship moving forward.  Discussed use of common OTC medications and supplements.  Discussed common dietary aids and use of caffeine and the need for good sleep hygiene and stress management.  - CBC auto differential; Future  - Comprehensive Metabolic Panel; Future  - Hemoglobin A1C; Future  - TSH; Future  - Lipid Panel; Future    2. Increased urinary frequency  - r/o UTI  - Urinalysis  - Urine culture    3. Encounter for screening examination for chlamydial infection  - C.  trachomatis/N. gonorrhoeae by AMP DNA [facility]; Urine    4. Need for HPV vaccination  - (In Office Administered) HPV Vaccine (9-Valent) (3 Dose) (IM)    5. Needs flu shot  - Influenza - Quadrivalent *Preferred* (6 months+) (PF)    6. BMI 45.0-49.9, adult  - Pt aware of importance of eating a prudent diet and exercising        RTC in 6 months     Marquita oG MD  10/12/2020 4:20 PM        No follow-ups on file.

## 2020-10-12 NOTE — LETTER
October 13, 2020      [place] [address] [address] [address]  Phone: [phone]  Fax: [phone]       Patient: Frankie Garcia   YOB: 1995  Date of Visit: 10/13/2020    To Whom It May Concern:    Mark Garcia  was at [facility] on 10/13/2020. He may return to work/school on 10/13/2020 with no restrictions. If you have any questions or concerns, or if I can be of further assistance, please do not hesitate to contact me.    Sincerely,    Marquita Go MD

## 2020-10-12 NOTE — PROGRESS NOTES
Administered Influenza Vaccine 0.5mL IM to right deltoid. No s/s of any adverse reaction noted.   Administered HPV Vaccine 0.5mL IM to right deltoid. No s/s of any adverse reaction noted.

## 2020-10-13 ENCOUNTER — LAB VISIT (OUTPATIENT)
Dept: LAB | Facility: HOSPITAL | Age: 25
End: 2020-10-13
Attending: FAMILY MEDICINE
Payer: COMMERCIAL

## 2020-10-13 DIAGNOSIS — Z00.00 ANNUAL PHYSICAL EXAM: ICD-10-CM

## 2020-10-13 DIAGNOSIS — Z11.59 NEED FOR HEPATITIS C SCREENING TEST: ICD-10-CM

## 2020-10-13 LAB
ALBUMIN SERPL BCP-MCNC: 3.7 G/DL (ref 3.5–5.2)
ALP SERPL-CCNC: 93 U/L (ref 55–135)
ALT SERPL W/O P-5'-P-CCNC: 13 U/L (ref 10–44)
ANION GAP SERPL CALC-SCNC: 6 MMOL/L (ref 8–16)
AST SERPL-CCNC: 13 U/L (ref 10–40)
BASOPHILS # BLD AUTO: 0.04 K/UL (ref 0–0.2)
BASOPHILS NFR BLD: 0.6 % (ref 0–1.9)
BILIRUB SERPL-MCNC: 0.5 MG/DL (ref 0.1–1)
BUN SERPL-MCNC: 14 MG/DL (ref 6–20)
CALCIUM SERPL-MCNC: 8.6 MG/DL (ref 8.7–10.5)
CHLORIDE SERPL-SCNC: 106 MMOL/L (ref 95–110)
CHOLEST SERPL-MCNC: 187 MG/DL (ref 120–199)
CHOLEST/HDLC SERPL: 3.9 {RATIO} (ref 2–5)
CO2 SERPL-SCNC: 27 MMOL/L (ref 23–29)
CREAT SERPL-MCNC: 0.8 MG/DL (ref 0.5–1.4)
DIFFERENTIAL METHOD: ABNORMAL
EOSINOPHIL # BLD AUTO: 0.2 K/UL (ref 0–0.5)
EOSINOPHIL NFR BLD: 2.9 % (ref 0–8)
ERYTHROCYTE [DISTWIDTH] IN BLOOD BY AUTOMATED COUNT: 13.5 % (ref 11.5–14.5)
EST. GFR  (AFRICAN AMERICAN): >60 ML/MIN/1.73 M^2
EST. GFR  (NON AFRICAN AMERICAN): >60 ML/MIN/1.73 M^2
GLUCOSE SERPL-MCNC: 82 MG/DL (ref 70–110)
HCT VFR BLD AUTO: 35.8 % (ref 37–48.5)
HDLC SERPL-MCNC: 48 MG/DL (ref 40–75)
HDLC SERPL: 25.7 % (ref 20–50)
HGB BLD-MCNC: 11.5 G/DL (ref 12–16)
IMM GRANULOCYTES # BLD AUTO: 0.03 K/UL (ref 0–0.04)
IMM GRANULOCYTES NFR BLD AUTO: 0.4 % (ref 0–0.5)
LDLC SERPL CALC-MCNC: 118.4 MG/DL (ref 63–159)
LYMPHOCYTES # BLD AUTO: 2 K/UL (ref 1–4.8)
LYMPHOCYTES NFR BLD: 29.3 % (ref 18–48)
MCH RBC QN AUTO: 27.3 PG (ref 27–31)
MCHC RBC AUTO-ENTMCNC: 32.1 G/DL (ref 32–36)
MCV RBC AUTO: 85 FL (ref 82–98)
MONOCYTES # BLD AUTO: 0.4 K/UL (ref 0.3–1)
MONOCYTES NFR BLD: 6.3 % (ref 4–15)
NEUTROPHILS # BLD AUTO: 4.1 K/UL (ref 1.8–7.7)
NEUTROPHILS NFR BLD: 60.5 % (ref 38–73)
NONHDLC SERPL-MCNC: 139 MG/DL
NRBC BLD-RTO: 0 /100 WBC
PLATELET # BLD AUTO: 233 K/UL (ref 150–350)
PMV BLD AUTO: 10.5 FL (ref 9.2–12.9)
POTASSIUM SERPL-SCNC: 4 MMOL/L (ref 3.5–5.1)
PROT SERPL-MCNC: 6.6 G/DL (ref 6–8.4)
RBC # BLD AUTO: 4.21 M/UL (ref 4–5.4)
SODIUM SERPL-SCNC: 139 MMOL/L (ref 136–145)
TRIGL SERPL-MCNC: 103 MG/DL (ref 30–150)
TSH SERPL DL<=0.005 MIU/L-ACNC: 1.93 UIU/ML (ref 0.4–4)
WBC # BLD AUTO: 6.82 K/UL (ref 3.9–12.7)

## 2020-10-13 PROCEDURE — 80061 LIPID PANEL: CPT

## 2020-10-13 PROCEDURE — 86803 HEPATITIS C AB TEST: CPT

## 2020-10-13 PROCEDURE — 80053 COMPREHEN METABOLIC PANEL: CPT

## 2020-10-13 PROCEDURE — 84443 ASSAY THYROID STIM HORMONE: CPT

## 2020-10-13 PROCEDURE — 83036 HEMOGLOBIN GLYCOSYLATED A1C: CPT

## 2020-10-13 PROCEDURE — 85025 COMPLETE CBC W/AUTO DIFF WBC: CPT

## 2020-10-14 ENCOUNTER — TELEPHONE (OUTPATIENT)
Dept: FAMILY MEDICINE | Facility: CLINIC | Age: 25
End: 2020-10-14

## 2020-10-14 DIAGNOSIS — D64.9 NORMOCYTIC ANEMIA: Primary | ICD-10-CM

## 2020-10-14 LAB
ESTIMATED AVG GLUCOSE: 94 MG/DL (ref 68–131)
HBA1C MFR BLD HPLC: 4.9 % (ref 4–5.6)
HCV AB SERPL QL IA: NEGATIVE

## 2020-10-14 NOTE — TELEPHONE ENCOUNTER
----- Message from [facility] sent at 10/14/2020  2:41 PM CDT -----  Who Called: ELEUTERIO MOMIN     Name of Test (Lab/Mammo/Etc): Labs    Date of Test: 10/13/2020    Ordering Provider: Dr. Michelle Hadley    Where the test was performed: [facility]    Best Call Back Number:  [phone]

## 2020-10-15 ENCOUNTER — LAB VISIT (OUTPATIENT)
Dept: LAB | Facility: HOSPITAL | Age: 25
End: 2020-10-15
Attending: INTERNAL MEDICINE
Payer: COMMERCIAL

## 2020-10-15 ENCOUNTER — PATIENT MESSAGE (OUTPATIENT)
Dept: FAMILY MEDICINE | Facility: CLINIC | Age: 25
End: 2020-10-15

## 2020-10-15 DIAGNOSIS — D64.9 NORMOCYTIC ANEMIA: ICD-10-CM

## 2020-10-15 DIAGNOSIS — N30.00 ACUTE CYSTITIS WITHOUT HEMATURIA: Primary | ICD-10-CM

## 2020-10-15 PROBLEM — D50.9 IDA (IRON DEFICIENCY ANEMIA): Status: ACTIVE | Noted: 2019-02-18

## 2020-10-15 LAB
BACTERIA UR CULT: ABNORMAL
FERRITIN SERPL-MCNC: 10 NG/ML (ref 20–300)
FOLATE SERPL-MCNC: 5.8 NG/ML (ref 4–24)
IRON SERPL-MCNC: 45 UG/DL (ref 30–160)
SATURATED IRON: 12 % (ref 20–50)
TOTAL IRON BINDING CAPACITY: 383 UG/DL (ref 250–450)
TRANSFERRIN SERPL-MCNC: 259 MG/DL (ref 200–375)
VIT B12 SERPL-MCNC: 408 PG/ML (ref 210–950)

## 2020-10-15 PROCEDURE — 36415 COLL VENOUS BLD VENIPUNCTURE: CPT | Mod: PO

## 2020-10-15 PROCEDURE — 82607 VITAMIN B-12: CPT

## 2020-10-15 PROCEDURE — 82746 ASSAY OF FOLIC ACID SERUM: CPT

## 2020-10-15 PROCEDURE — 87529 HSV DNA AMP PROBE: CPT

## 2020-10-15 PROCEDURE — 82728 ASSAY OF FERRITIN: CPT

## 2020-10-15 PROCEDURE — 83540 ASSAY OF IRON: CPT

## 2020-10-15 RX ORDER — AMOXICILLIN AND CLAVULANATE POTASSIUM 875; 125 MG/1; MG/1
1 TABLET, FILM COATED ORAL EVERY 12 HOURS
Qty: 10 TABLET | Refills: 0 | Status: SHIPPED | OUTPATIENT
Start: 2020-10-15 | End: 2020-10-20

## 2020-10-16 ENCOUNTER — OFFICE VISIT (OUTPATIENT)
Dept: DERMATOLOGY | Facility: CLINIC | Age: 25
End: 2020-10-16
Payer: COMMERCIAL

## 2020-10-16 DIAGNOSIS — T14.8XXA EXCORIATION: ICD-10-CM

## 2020-10-16 DIAGNOSIS — L98.9 DISEASE OF SKIN AND SUBCUTANEOUS TISSUE: Primary | ICD-10-CM

## 2020-10-16 PROCEDURE — 99214 PR OFFICE/OUTPT VISIT, EST, LEVL IV, 30-39 MIN: ICD-10-PCS | Mod: S$GLB,,, | Performed by: DERMATOLOGY

## 2020-10-16 PROCEDURE — 99999 PR PBB SHADOW E&M-EST. PATIENT-LVL III: CPT | Mod: PBBFAC,,, | Performed by: DERMATOLOGY

## 2020-10-16 PROCEDURE — 99999 PR PBB SHADOW E&M-EST. PATIENT-LVL III: ICD-10-PCS | Mod: PBBFAC,,, | Performed by: DERMATOLOGY

## 2020-10-16 PROCEDURE — 99214 OFFICE O/P EST MOD 30 MIN: CPT | Mod: S$GLB,,, | Performed by: DERMATOLOGY

## 2020-10-16 RX ORDER — MUPIROCIN 20 MG/G
OINTMENT TOPICAL
Qty: 30 G | Refills: 1 | Status: SHIPPED | OUTPATIENT
Start: 2020-10-16 | End: 2023-04-27

## 2020-10-16 NOTE — PROGRESS NOTES
"  Subjective:       Patient ID:  Frankie Garcia is a 25 y.o. female who presents for   Chief Complaint   Patient presents with    Lesion     Right arm/right breast     Patient with new complaint of lesion(s)  Location: right forearm  Duration: 1 week  Symptoms: hurts  Relieving factors/Previous treatments: ceftin per "friend who is a doctor" since weekend   Similar in past (jan 2020) in similar location    Also c/o sores on right breast x 3 months. Hurt. Washing with dial soap and was using bactroban until ran out now using nystatin        Review of Systems   Skin: Negative for itching and rash.   Psychiatric/Behavioral: Negative for depressed mood, anxiety and high stress.        Objective:    Physical Exam   Constitutional: She appears well-developed and well-nourished. She is obese.  No distress.   Neurological: She is alert and oriented to person, place, and time. She is not disoriented.   Psychiatric: She has a normal mood and affect.   Skin:   Areas Examined (abnormalities noted in diagram):   Chest / Axilla Inspection Performed  RUE Inspected  LUE Inspection Performed              Diagram Legend     Erythematous scaling macule/papule c/w actinic keratosis       Vascular papule c/w angioma      Pigmented verrucoid papule/plaque c/w seborrheic keratosis      Yellow umbilicated papule c/w sebaceous hyperplasia      Irregularly shaped tan macule c/w lentigo     1-2 mm smooth white papules consistent with Milia      Movable subcutaneous cyst with punctum c/w epidermal inclusion cyst      Subcutaneous movable cyst c/w pilar cyst      Firm pink to brown papule c/w dermatofibroma      Pedunculated fleshy papule(s) c/w skin tag(s)      Evenly pigmented macule c/w junctional nevus     Mildly variegated pigmented, slightly irregular-bordered macule c/w mildly atypical nevus      Flesh colored to evenly pigmented papule c/w intradermal nevus       Pink pearly papule/plaque c/w basal cell carcinoma      Erythematous " hyperkeratotic cursted plaque c/w SCC      Surgical scar with no sign of skin cancer recurrence      Open and closed comedones      Inflammatory papules and pustules      Verrucoid papule consistent consistent with wart     Erythematous eczematous patches and plaques     Dystrophic onycholytic nail with subungual debris c/w onychomycosis     Umbilicated papule    Erythematous-base heme-crusted tan verrucoid plaque consistent with inflamed seborrheic keratosis     Erythematous Silvery Scaling Plaque c/w Psoriasis     See annotation                      Assessment / Plan:        Disease of skin and subcutaneous tissue - r/o herpes gladiatorum  -     HSV by Rapid PCR, Non-Blood [facility]; Skin (right forearm); Future; Expected date: 10/16/2020    Excoriations - right chest; no primary lesions  D/c manipulation  -     mupirocin (BACTROBAN) 2 % ointment; AAA chest bid  Dispense: 30 g; Refill: 1             No follow-ups on file.

## 2020-10-21 ENCOUNTER — PATIENT MESSAGE (OUTPATIENT)
Dept: FAMILY MEDICINE | Facility: CLINIC | Age: 25
End: 2020-10-21

## 2020-10-21 ENCOUNTER — PATIENT MESSAGE (OUTPATIENT)
Dept: DERMATOLOGY | Facility: CLINIC | Age: 25
End: 2020-10-21

## 2020-10-21 PROBLEM — B00.9 HSV-2 (HERPES SIMPLEX VIRUS 2) INFECTION: Status: ACTIVE | Noted: 2020-10-21

## 2020-10-21 LAB
HSV1 DNA SPEC QL NAA+PROBE: NEGATIVE
HSV2 DNA SPEC QL NAA+PROBE: POSITIVE
SPECIMEN SOURCE: ABNORMAL

## 2020-10-22 RX ORDER — VALACYCLOVIR HYDROCHLORIDE 1 G/1
TABLET, FILM COATED ORAL
Qty: 30 TABLET | Refills: 3 | Status: SHIPPED | OUTPATIENT
Start: 2020-10-22 | End: 2023-04-27

## 2020-11-09 ENCOUNTER — TELEPHONE (OUTPATIENT)
Dept: FAMILY MEDICINE | Facility: CLINIC | Age: 25
End: 2020-11-09

## 2020-11-09 NOTE — TELEPHONE ENCOUNTER
Return call to Pt, and she is requesting to be tested for Covid-19. She states that she has diarrhea, cough, sore throat, congestion for 2 day's. Pt given appointment time for tomorrow, and she refused. She states that her job is requesting her to have it done today. I checked another Provider's schedule, no openings today in the clinic. I informed Pt that she can go to  and get same day testing. Pt states that she will call back.

## 2020-11-09 NOTE — TELEPHONE ENCOUNTER
----- Message from Mariah Stein sent at 11/9/2020  8:10 AM CST -----  Type: Patient Call Back    Who called: Pt    What is the request in detail: Pt would like covid testing done. Pt states she is having covid like symptoms.     Can the clinic reply by [facility]? No     Would the patient rather a call back or a response via [facility]? Call Back     Best call back number: [phone] (mobile)    Additional Information:       Thank You

## 2020-11-14 ENCOUNTER — PATIENT MESSAGE (OUTPATIENT)
Dept: OBSTETRICS AND GYNECOLOGY | Facility: CLINIC | Age: 25
End: 2020-11-14

## 2020-11-18 DIAGNOSIS — Z30.9 ENCOUNTER FOR CONTRACEPTIVE MANAGEMENT, UNSPECIFIED TYPE: Primary | ICD-10-CM

## 2020-11-18 RX ORDER — DESOGESTREL AND ETHINYL ESTRADIOL 21-5 (28)
1 KIT ORAL DAILY
Qty: 90 TABLET | Refills: 3 | Status: SHIPPED | OUTPATIENT
Start: 2020-11-18 | End: 2021-06-22

## 2020-11-18 NOTE — TELEPHONE ENCOUNTER
Spoke to pt and informed Dr. Stout recommends Kariva as birth control. Pt stated she would like to try and verbalized understanding.

## 2021-01-21 NOTE — PROGRESS NOTES
"[facility] Hospital Medicine  Progress Note    Patient Name: Frankie Garcia  MRN: [id number]  Patient Class: IP- Inpatient   Admission Date: 2/18/2019  Length of Stay: 1 days  Attending Physician: Jonathan Chiu MD  Primary Care Provider: Michelle Hadlye MD        Subjective:     Principal Problem:Pyelonephritis    HPI:  23 y.o. female with obesity and tobacco use presents with a complaint of body aches, fever (T max 103.6 F), flank pain, lower abdominal pain, nausea, vomiting for the past week.  Reports she was diagnosed with UTI 6 days ago and started on Bactrim with no improvement of symptoms, went to urgent care yesterday and was prescribed Keflex.  She is also taken azo, Tylenol, and ibuprofen.  She denies cough, SOB, palpitations, dizziness, syncope, diarrhea, bloody or black stools.  In the ED her urinalysis showed evidence of infection.  Routine labs show mild hyponatremia, mild metabolic acidosis, and mild anemia.  UPT negative.  CT abdomen pelvis showed inflammatory changes within the lower abdomen, more pronounced within the right lower quadrant and right adnexal region of uncertain etiology with small amount of right adnexal and pelvic free fluid seen.  No evidence of organized fluid collection or rim enhancing abscess.  Few prominent lymph nodes are seen in the region which may reflect mesenteric adenitis.  Pelvic ultrasound pending.  Blood in urine cultures were obtained.  IV fluids and IV antibiotics were initiated.  Placed in observation for further evaluation and treatment.      Hospital Course:  Patient dmitted for pyeloneophritis. CT AP with Contrast "Inflammatory changes seen within the lower abdomen, more pronounced within the right lower quadrant and right adnexal region of uncertain etiology with small amount of right adnexal and pelvic free fluid seen.  No evidence of organized fluid collection or rim enhancing abscess.  Few prominent lymph nodes are seen in the " Addended by: HUBER PETERSEN on: 1/21/2021 03:04 PM     Modules accepted: Orders     "region which may reflect mesenteric adenitis." CT also showed "distal ileal loops within the right lower quadrant course through the region of inflammatory change."  US of pelviic "Free fluid seen in the pelvis which is slightly greater than expected normal physiologic volume.Otherwise no significant abnormalities identified."   Patient with persistent fevers.  Gyn and ID consulted.    Interval History: Persistent fevers, but feeling better.    Review of Systems   HENT: Negative for ear discharge and ear pain.    Eyes: Negative for pain and itching.   Endocrine: Negative for polyphagia and polyuria.   Neurological: Negative for seizures and syncope.     Objective:     Vital Signs (Most Recent):  Temp: 99.5 °F (37.5 °C) (02/20/19 1613)  Pulse: 90 (02/20/19 1613)  Resp: 19 (02/20/19 1613)  BP: (!) 103/55 (02/20/19 1613)  SpO2: 96 % (02/20/19 1613) Vital Signs (24h Range):  Temp:  [98 °F (36.7 °C)-103 °F (39.4 °C)] 99.5 °F (37.5 °C)  Pulse:  [] 90  Resp:  [17-19] 19  SpO2:  [95 %-99 %] 96 %  BP: (102-116)/(53-61) 103/55     Weight: 133.4 kg (294 lb 1.5 oz)  Body mass index is 48.94 kg/m².    Intake/Output Summary (Last 24 hours) at 2/20/2019 1633  Last data filed at 2/20/2019 1400  Gross per 24 hour   Intake 1881.25 ml   Output 2400 ml   Net -518.75 ml      Physical Exam   Constitutional: She is oriented to person, place, and time. She appears well-developed and well-nourished. No distress.   HENT:   Head: Normocephalic and atraumatic.   Mouth/Throat: Normal dentition. No dental abscesses, lacerations or dental caries. No oropharyngeal exudate.   Eyes: Conjunctivae are normal. No scleral icterus.   Cardiovascular: Normal rate, regular rhythm and intact distal pulses.   No murmur heard.  Pulmonary/Chest: Effort normal and breath sounds normal. No respiratory distress. She has no wheezes. She has no rales.   Abdominal: Soft. Bowel sounds are normal. She exhibits no distension. There is no tenderness. There is no " rebound and no guarding.   No abdominal or pelvic tenderness   Genitourinary: Vagina normal.   Genitourinary Comments: No CVA tenderness   Musculoskeletal: She exhibits no edema.   Neurological: She is alert and oriented to person, place, and time. No cranial nerve deficit.   Skin: Skin is warm and dry. No rash noted. No erythema.   Psychiatric: She has a normal mood and affect. Her behavior is normal. Judgment and thought content normal.       Significant Labs:   BMP:   Recent Labs   Lab 02/20/19  0528         K 3.5      CO2 23   BUN 4*   CREATININE 0.8   CALCIUM 8.8     CBC:   Recent Labs   Lab 02/19/19  0450 02/20/19  0528   WBC 6.32 4.92   HGB 9.8* 9.3*   HCT 31.5* 29.5*    156       Significant Imaging: I have reviewed all pertinent imaging results/findings within the past 24 hours.    Assessment/Plan:      * Pyelonephritis    Fever, flank pain, evidence of infection on urinalysis.  She mets criteria for sepsis with fever, tachycardia, tachypnea, and urinary source.  Lactic acid normal.   Started empirically on Zosyn.  Persistent fevers.  CT and US of abdomen showing small amount of pelvic free fluid.  Gyn and ID consulted.  All cultures negative so far.  Continue ABx's and follow ID recommendations.     Right lower quadrant pain    Persistent fever and continue lower abdominal pain R>L. Blood and urine culture remains ngsf. Continue zosyn, IVF and tylenol/ibuprofen for fever. Concern for early appendicitis? Will repeat CT abdomen pelvis. Obtain lactic acid.      Diarrhea in adult patient    Probably due to antibiotics. C diff negative.        Mesenteric lymphadenitis    Per CT, self-limiting, continue supportive care.     Anemia    Normocytic, normochromic.  No baseline on file but has been told in the past that she was anemic and has taken iron supplements in the past.  She denies obvious observed bleeding.  Low Iron studies. Start ferrous sulfate     Hyponatremia    Mild,  asymptomatic, likely hypovolemic. Resolved.      Cigarette smoker    5 minutes spent counseling the patient on smoking cessation and she has not smoked in a few weeks due to feeling bad and is currently ready to stop smoking. She will be offereded a nicotine transdermal patch while hospitalized and monitored for withdrawal.  Will provide additional smoking cessation counseling prior to discharge.      Class 3 severe obesity due to excess calories without serious comorbidity with body mass index (BMI) of 40.0 to 44.9 in adult    BMI Body mass index is 48.94 kg/m².  Patient counseled on risks obesity imparts on overall health. Urged toward diet and lifestyle modifications to aid in weight reduction.        VTE Risk Mitigation (From admission, onward)        Ordered     IP VTE HIGH RISK PATIENT  Once      02/18/19 2041              Jonathan Chiu MD  Department of Hospital Medicine   [facility]

## 2021-01-27 ENCOUNTER — HOSPITAL ENCOUNTER (EMERGENCY)
Facility: HOSPITAL | Age: 26
Discharge: HOME | End: 2021-01-27
Attending: EMERGENCY MEDICINE
Payer: COMMERCIAL

## 2021-01-27 VITALS
HEIGHT: 66 IN | WEIGHT: 289 LBS | HEART RATE: 72 BPM | BODY MASS INDEX: 46.45 KG/M2 | OXYGEN SATURATION: 99 % | DIASTOLIC BLOOD PRESSURE: 66 MMHG | RESPIRATION RATE: 16 BRPM | TEMPERATURE: 98 F | SYSTOLIC BLOOD PRESSURE: 123 MMHG

## 2021-01-27 DIAGNOSIS — T78.40XA ALLERGIC REACTION: ICD-10-CM

## 2021-01-27 DIAGNOSIS — R25.1 TREMOR: Primary | ICD-10-CM

## 2021-01-27 LAB
ALBUMIN SERPL BCP-MCNC: 3.8 G/DL (ref 3.5–5.2)
ALP SERPL-CCNC: 78 U/L (ref 55–135)
ALT SERPL W/O P-5'-P-CCNC: 12 U/L (ref 10–44)
ANION GAP SERPL CALC-SCNC: 10 MMOL/L (ref 8–16)
AST SERPL-CCNC: 10 U/L (ref 10–40)
B-HCG UR QL: NEGATIVE
BASOPHILS # BLD AUTO: 0.05 K/UL (ref 0–0.2)
BASOPHILS NFR BLD: 0.5 % (ref 0–1.9)
BILIRUB SERPL-MCNC: 0.2 MG/DL (ref 0.1–1)
BUN SERPL-MCNC: 15 MG/DL (ref 6–20)
CALCIUM SERPL-MCNC: 9 MG/DL (ref 8.7–10.5)
CHLORIDE SERPL-SCNC: 107 MMOL/L (ref 95–110)
CO2 SERPL-SCNC: 25 MMOL/L (ref 23–29)
CREAT SERPL-MCNC: 0.8 MG/DL (ref 0.5–1.4)
CTP QC/QA: YES
DIFFERENTIAL METHOD: ABNORMAL
EOSINOPHIL # BLD AUTO: 0 K/UL (ref 0–0.5)
EOSINOPHIL NFR BLD: 0.3 % (ref 0–8)
ERYTHROCYTE [DISTWIDTH] IN BLOOD BY AUTOMATED COUNT: 13.3 % (ref 11.5–14.5)
EST. GFR  (AFRICAN AMERICAN): >60 ML/MIN/1.73 M^2
EST. GFR  (NON AFRICAN AMERICAN): >60 ML/MIN/1.73 M^2
GLUCOSE SERPL-MCNC: 103 MG/DL (ref 70–110)
HCT VFR BLD AUTO: 35.6 % (ref 37–48.5)
HGB BLD-MCNC: 12.1 G/DL (ref 12–16)
IMM GRANULOCYTES # BLD AUTO: 0.02 K/UL (ref 0–0.04)
IMM GRANULOCYTES NFR BLD AUTO: 0.2 % (ref 0–0.5)
LYMPHOCYTES # BLD AUTO: 2.8 K/UL (ref 1–4.8)
LYMPHOCYTES NFR BLD: 27.5 % (ref 18–48)
MCH RBC QN AUTO: 28 PG (ref 27–31)
MCHC RBC AUTO-ENTMCNC: 34 G/DL (ref 32–36)
MCV RBC AUTO: 82 FL (ref 82–98)
MONOCYTES # BLD AUTO: 0.7 K/UL (ref 0.3–1)
MONOCYTES NFR BLD: 6.6 % (ref 4–15)
NEUTROPHILS # BLD AUTO: 6.6 K/UL (ref 1.8–7.7)
NEUTROPHILS NFR BLD: 64.9 % (ref 38–73)
NRBC BLD-RTO: 0 /100 WBC
PLATELET # BLD AUTO: 250 K/UL (ref 150–350)
PMV BLD AUTO: 10.1 FL (ref 9.2–12.9)
POTASSIUM SERPL-SCNC: 3.8 MMOL/L (ref 3.5–5.1)
PROT SERPL-MCNC: 7.1 G/DL (ref 6–8.4)
RBC # BLD AUTO: 4.32 M/UL (ref 4–5.4)
SODIUM SERPL-SCNC: 142 MMOL/L (ref 136–145)
WBC # BLD AUTO: 10.1 K/UL (ref 3.9–12.7)

## 2021-01-27 PROCEDURE — 96375 TX/PRO/DX INJ NEW DRUG ADDON: CPT

## 2021-01-27 PROCEDURE — 96374 THER/PROPH/DIAG INJ IV PUSH: CPT

## 2021-01-27 PROCEDURE — 25000003 PHARM REV CODE 250: Performed by: EMERGENCY MEDICINE

## 2021-01-27 PROCEDURE — 93005 ELECTROCARDIOGRAM TRACING: CPT

## 2021-01-27 PROCEDURE — 63600175 PHARM REV CODE 636 W HCPCS: Performed by: EMERGENCY MEDICINE

## 2021-01-27 PROCEDURE — 85025 COMPLETE CBC W/AUTO DIFF WBC: CPT

## 2021-01-27 PROCEDURE — 80053 COMPREHEN METABOLIC PANEL: CPT

## 2021-01-27 PROCEDURE — 93010 EKG 12-LEAD: ICD-10-PCS | Mod: ,,, | Performed by: INTERNAL MEDICINE

## 2021-01-27 PROCEDURE — 93010 ELECTROCARDIOGRAM REPORT: CPT | Mod: ,,, | Performed by: INTERNAL MEDICINE

## 2021-01-27 PROCEDURE — 99284 EMERGENCY DEPT VISIT MOD MDM: CPT | Mod: 25

## 2021-01-27 PROCEDURE — 81025 URINE PREGNANCY TEST: CPT | Performed by: EMERGENCY MEDICINE

## 2021-01-27 RX ORDER — DIPHENHYDRAMINE HYDROCHLORIDE 50 MG/ML
25 INJECTION INTRAMUSCULAR; INTRAVENOUS
Status: COMPLETED | OUTPATIENT
Start: 2021-01-27 | End: 2021-01-27

## 2021-01-27 RX ORDER — HALOPERIDOL 5 MG/ML
2.5 INJECTION INTRAMUSCULAR
Status: COMPLETED | OUTPATIENT
Start: 2021-01-27 | End: 2021-01-27

## 2021-01-27 RX ORDER — DIAZEPAM 10 MG/2ML
5 INJECTION INTRAMUSCULAR
Status: COMPLETED | OUTPATIENT
Start: 2021-01-27 | End: 2021-01-27

## 2021-01-27 RX ADMIN — HALOPERIDOL LACTATE 2.5 MG: 5 INJECTION, SOLUTION INTRAMUSCULAR at 06:01

## 2021-01-27 RX ADMIN — SODIUM CHLORIDE 1000 ML: 0.9 INJECTION, SOLUTION INTRAVENOUS at 06:01

## 2021-01-27 RX ADMIN — DIAZEPAM 5 MG: 10 INJECTION, SOLUTION INTRAMUSCULAR; INTRAVENOUS at 05:01

## 2021-01-27 RX ADMIN — DIPHENHYDRAMINE HYDROCHLORIDE 25 MG: 50 INJECTION INTRAMUSCULAR; INTRAVENOUS at 06:01

## 2021-04-08 NOTE — TELEPHONE ENCOUNTER
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

## 2021-06-22 ENCOUNTER — OFFICE VISIT (OUTPATIENT)
Dept: INTERNAL MEDICINE | Facility: CLINIC | Age: 26
End: 2021-06-22
Payer: COMMERCIAL

## 2021-06-22 ENCOUNTER — LAB VISIT (OUTPATIENT)
Dept: LAB | Facility: OTHER | Age: 26
End: 2021-06-22
Attending: PHYSICIAN ASSISTANT
Payer: COMMERCIAL

## 2021-06-22 VITALS
HEIGHT: 66 IN | SYSTOLIC BLOOD PRESSURE: 100 MMHG | OXYGEN SATURATION: 100 % | DIASTOLIC BLOOD PRESSURE: 70 MMHG | HEART RATE: 64 BPM | BODY MASS INDEX: 47.09 KG/M2 | WEIGHT: 293 LBS

## 2021-06-22 DIAGNOSIS — R14.0 ABDOMINAL BLOATING: ICD-10-CM

## 2021-06-22 DIAGNOSIS — R10.9 ABDOMINAL PAIN, UNSPECIFIED ABDOMINAL LOCATION: Primary | ICD-10-CM

## 2021-06-22 DIAGNOSIS — R10.11 RUQ PAIN: ICD-10-CM

## 2021-06-22 DIAGNOSIS — R10.9 ABDOMINAL PAIN, UNSPECIFIED ABDOMINAL LOCATION: ICD-10-CM

## 2021-06-22 DIAGNOSIS — R11.0 NAUSEA: ICD-10-CM

## 2021-06-22 LAB
ALBUMIN SERPL BCP-MCNC: 3.9 G/DL (ref 3.5–5.2)
ALP SERPL-CCNC: 95 U/L (ref 55–135)
ALT SERPL W/O P-5'-P-CCNC: 10 U/L (ref 10–44)
ANION GAP SERPL CALC-SCNC: 10 MMOL/L (ref 8–16)
AST SERPL-CCNC: 16 U/L (ref 10–40)
BASOPHILS # BLD AUTO: 0.04 K/UL (ref 0–0.2)
BASOPHILS NFR BLD: 0.5 % (ref 0–1.9)
BILIRUB SERPL-MCNC: 0.5 MG/DL (ref 0.1–1)
BUN SERPL-MCNC: 10 MG/DL (ref 6–20)
CALCIUM SERPL-MCNC: 9.3 MG/DL (ref 8.7–10.5)
CHLORIDE SERPL-SCNC: 106 MMOL/L (ref 95–110)
CO2 SERPL-SCNC: 25 MMOL/L (ref 23–29)
CREAT SERPL-MCNC: 0.8 MG/DL (ref 0.5–1.4)
DIFFERENTIAL METHOD: NORMAL
EOSINOPHIL # BLD AUTO: 0.1 K/UL (ref 0–0.5)
EOSINOPHIL NFR BLD: 1.9 % (ref 0–8)
ERYTHROCYTE [DISTWIDTH] IN BLOOD BY AUTOMATED COUNT: 13 % (ref 11.5–14.5)
EST. GFR  (AFRICAN AMERICAN): >60 ML/MIN/1.73 M^2
EST. GFR  (NON AFRICAN AMERICAN): >60 ML/MIN/1.73 M^2
GLUCOSE SERPL-MCNC: 85 MG/DL (ref 70–110)
HCT VFR BLD AUTO: 37.4 % (ref 37–48.5)
HGB BLD-MCNC: 12.1 G/DL (ref 12–16)
IMM GRANULOCYTES # BLD AUTO: 0.02 K/UL (ref 0–0.04)
IMM GRANULOCYTES NFR BLD AUTO: 0.3 % (ref 0–0.5)
LIPASE SERPL-CCNC: 17 U/L (ref 4–60)
LYMPHOCYTES # BLD AUTO: 2.6 K/UL (ref 1–4.8)
LYMPHOCYTES NFR BLD: 34.9 % (ref 18–48)
MCH RBC QN AUTO: 27.7 PG (ref 27–31)
MCHC RBC AUTO-ENTMCNC: 32.4 G/DL (ref 32–36)
MCV RBC AUTO: 86 FL (ref 82–98)
MONOCYTES # BLD AUTO: 0.5 K/UL (ref 0.3–1)
MONOCYTES NFR BLD: 6.3 % (ref 4–15)
NEUTROPHILS # BLD AUTO: 4.1 K/UL (ref 1.8–7.7)
NEUTROPHILS NFR BLD: 56.1 % (ref 38–73)
NRBC BLD-RTO: 0 /100 WBC
PLATELET # BLD AUTO: 229 K/UL (ref 150–450)
PMV BLD AUTO: 9.4 FL (ref 9.2–12.9)
POTASSIUM SERPL-SCNC: 4.4 MMOL/L (ref 3.5–5.1)
PROT SERPL-MCNC: 7.1 G/DL (ref 6–8.4)
RBC # BLD AUTO: 4.37 M/UL (ref 4–5.4)
SODIUM SERPL-SCNC: 141 MMOL/L (ref 136–145)
WBC # BLD AUTO: 7.36 K/UL (ref 3.9–12.7)

## 2021-06-22 PROCEDURE — 99214 PR OFFICE/OUTPT VISIT, EST, LEVL IV, 30-39 MIN: ICD-10-PCS | Mod: S$GLB,,, | Performed by: PHYSICIAN ASSISTANT

## 2021-06-22 PROCEDURE — 1126F PR PAIN SEVERITY QUANTIFIED, NO PAIN PRESENT: ICD-10-PCS | Mod: S$GLB,,, | Performed by: PHYSICIAN ASSISTANT

## 2021-06-22 PROCEDURE — 80053 COMPREHEN METABOLIC PANEL: CPT | Performed by: PHYSICIAN ASSISTANT

## 2021-06-22 PROCEDURE — 1126F AMNT PAIN NOTED NONE PRSNT: CPT | Mod: S$GLB,,, | Performed by: PHYSICIAN ASSISTANT

## 2021-06-22 PROCEDURE — 36415 COLL VENOUS BLD VENIPUNCTURE: CPT | Performed by: PHYSICIAN ASSISTANT

## 2021-06-22 PROCEDURE — 99999 PR PBB SHADOW E&M-EST. PATIENT-LVL III: CPT | Mod: PBBFAC,,, | Performed by: PHYSICIAN ASSISTANT

## 2021-06-22 PROCEDURE — 99214 OFFICE O/P EST MOD 30 MIN: CPT | Mod: S$GLB,,, | Performed by: PHYSICIAN ASSISTANT

## 2021-06-22 PROCEDURE — 85025 COMPLETE CBC W/AUTO DIFF WBC: CPT | Performed by: PHYSICIAN ASSISTANT

## 2021-06-22 PROCEDURE — 3008F BODY MASS INDEX DOCD: CPT | Mod: CPTII,S$GLB,, | Performed by: PHYSICIAN ASSISTANT

## 2021-06-22 PROCEDURE — 99999 PR PBB SHADOW E&M-EST. PATIENT-LVL III: ICD-10-PCS | Mod: PBBFAC,,, | Performed by: PHYSICIAN ASSISTANT

## 2021-06-22 PROCEDURE — 3008F PR BODY MASS INDEX (BMI) DOCUMENTED: ICD-10-PCS | Mod: CPTII,S$GLB,, | Performed by: PHYSICIAN ASSISTANT

## 2021-06-22 PROCEDURE — 83690 ASSAY OF LIPASE: CPT | Performed by: PHYSICIAN ASSISTANT

## 2021-06-24 ENCOUNTER — HOSPITAL ENCOUNTER (OUTPATIENT)
Dept: RADIOLOGY | Facility: OTHER | Age: 26
Discharge: HOME | End: 2021-06-24
Attending: PHYSICIAN ASSISTANT
Payer: COMMERCIAL

## 2021-06-24 DIAGNOSIS — R10.9 ABDOMINAL PAIN, UNSPECIFIED ABDOMINAL LOCATION: ICD-10-CM

## 2021-06-24 PROCEDURE — 76705 ECHO EXAM OF ABDOMEN: CPT | Mod: 26,,, | Performed by: RADIOLOGY

## 2021-06-24 PROCEDURE — 76705 ECHO EXAM OF ABDOMEN: CPT | Mod: TC

## 2021-06-24 PROCEDURE — 76705 US ABDOMEN LIMITED: ICD-10-PCS | Mod: 26,,, | Performed by: RADIOLOGY

## 2021-10-19 ENCOUNTER — PATIENT MESSAGE (OUTPATIENT)
Dept: OBSTETRICS AND GYNECOLOGY | Facility: CLINIC | Age: 26
End: 2021-10-19
Payer: COMMERCIAL

## 2021-12-28 ENCOUNTER — PATIENT OUTREACH (OUTPATIENT)
Dept: ADMINISTRATIVE | Facility: OTHER | Age: 26
End: 2021-12-28
Payer: COMMERCIAL

## 2022-07-27 ENCOUNTER — TELEPHONE (OUTPATIENT)
Dept: OBSTETRICS AND GYNECOLOGY | Facility: CLINIC | Age: 27
End: 2022-07-27

## 2022-07-27 ENCOUNTER — TELEPHONE (OUTPATIENT)
Dept: OBSTETRICS AND GYNECOLOGY | Facility: CLINIC | Age: 27
End: 2022-07-27
Payer: COMMERCIAL

## 2022-07-27 NOTE — TELEPHONE ENCOUNTER
----- Message from Mimi Mcconnell sent at 7/26/2022 11:56 AM CDT -----  Regarding: self [phone]  Who called: self     What is the request in detail: pt stated she just received a notification stating her appt was changed, with no call from office. She stated the time doesn't work for her and would like to know why the appt had to be rescheduled.     Can the clinic reply by [facility]? no     Would the patient rather a call back or a response via [facility]?  Call back     Best call back number: [phone]       Pt requested a supervisor to call her back regarding this.

## 2022-07-27 NOTE — TELEPHONE ENCOUNTER
----- Message from Lukasz Bryant sent at 7/27/2022 10:52 AM CDT -----  Type:  Patient Returning Call    Who Called: self    Who Left Message for Patient: Marcosdavid    Does the patient know what this is regarding?:yes    Would the patient rather a call back or a response via [facility]? call    Best Call Back Number:[phone] (home)

## 2022-08-25 ENCOUNTER — PATIENT MESSAGE (OUTPATIENT)
Dept: OBSTETRICS AND GYNECOLOGY | Facility: CLINIC | Age: 27
End: 2022-08-25
Payer: COMMERCIAL

## 2023-04-03 NOTE — CONSULTS
[facility]  Infectious Disease  Consult Note    Patient Name: Frankie Garcia  MRN: [id number]  Admission Date: 2/18/2019  Hospital Length of Stay: 1 days  Attending Physician: Jonathan Chiu MD  Primary Care Provider: Michelle Hadley MD       Inpatient consult to Infectious Diseases  Consult performed by: Jade Gilmore PA-C  Consult ordered by: Jonathan Chiu MD          ID consult received. Chart being reviewed. Will see patient today with full consult note and recommendations to follow.    In the interim, please call with any questions.      Thank you,  Jade Gilmore PA-C  [phone]         CHIEF COMPLAINT:    Interval Events:     seen and examined at bedside.     REVIEW OF SYSTEMS:  Constitutional: No fevers or chills. No weight loss. No fatigue or generalised malaise.  Eyes: No itching or discharge from the eyes  ENT: No ear pain. No ear discharge. No nasal congestion. No post nasal drip. No epistaxis. No throat pain. No sore throat. No difficulty swallowing.   CV: No chest pain. No palpitations. No lightheadedness or dizziness.   Resp: No dyspnea at rest. No dyspnea on exertion. No orthopnea. No wheezing. No cough. No stridor. No sputum production. No chest pain with respiration.      OBJECTIVE:  ICU Vital Signs Last 24 Hrs  T(C): 36.4 (03 Apr 2023 05:45), Max: 37.1 (02 Apr 2023 21:20)  T(F): 97.6 (03 Apr 2023 05:45), Max: 98.8 (02 Apr 2023 21:20)  HR: 72 (03 Apr 2023 05:45) (72 - 86)  BP: 131/88 (03 Apr 2023 05:45) (115/60 - 131/88)  BP(mean): --  ABP: --  ABP(mean): --  RR: 19 (03 Apr 2023 05:45) (19 - 19)  SpO2: 100% (03 Apr 2023 05:45) (97% - 100%)    O2 Parameters below as of 03 Apr 2023 05:45  Patient On (Oxygen Delivery Method): nasal cannula  O2 Flow (L/min): 3        Mode: standby    CAPILLARY BLOOD GLUCOSE      POCT Blood Glucose.: 191 mg/dL (02 Apr 2023 22:22)      PHYSICAL EXAM:  GENERAL: NAD, well-groomed, well-developed  HEAD:  Atraumatic, Normocephalic  EYES: EOMI, PERRLA, conjunctiva and sclera clear  ENMT: No tonsillar erythema, exudates, or enlargement; Moist mucous membranes, Good dentition, No lesions  NECK: Supple, No JVD, Normal thyroid  CHEST/LUNG: rhonchi and wheeze   HEART: Regular rate and rhythm; No murmurs, rubs, or gallops  ABDOMEN: Soft, Nontender, Nondistended; Bowel sounds present  VASCULAR:  2+ Peripheral Pulses, No clubbing, cyanosis, or edema  LYMPH: No lymphadenopathy noted  SKIN: No rashes or lesions  NERVOUS SYSTEM:  Alert & Oriented X3, Good concentration; Motor Strength 5/5 B/L upper and lower extremities; DTRs 2+ intact and symmetric    HOSPITAL MEDICATIONS:  MEDICATIONS  (STANDING):  acetylcysteine 10%  Inhalation 4 milliLiter(s) Inhalation two times a day  albuterol/ipratropium for Nebulization 3 milliLiter(s) Nebulizer every 6 hours  amLODIPine   Tablet 5 milliGRAM(s) Oral daily  budesonide 160 MICROgram(s)/formoterol 4.5 MICROgram(s) Inhaler 2 Puff(s) Inhalation two times a day  cefepime   IVPB      cefepime   IVPB 2000 milliGRAM(s) IV Intermittent every 12 hours  chlorhexidine 2% Cloths 1 Application(s) Topical daily  dextrose 5%. 1000 milliLiter(s) (100 mL/Hr) IV Continuous <Continuous>  dextrose 5%. 1000 milliLiter(s) (50 mL/Hr) IV Continuous <Continuous>  dextrose 50% Injectable 25 Gram(s) IV Push once  dextrose 50% Injectable 12.5 Gram(s) IV Push once  dextrose 50% Injectable 25 Gram(s) IV Push once  enoxaparin Injectable 30 milliGRAM(s) SubCutaneous every 24 hours  fluticasone propionate 50 MICROgram(s)/spray Nasal Spray 1 Spray(s) Both Nostrils two times a day  glucagon  Injectable 1 milliGRAM(s) IntraMuscular once  influenza  Vaccine (HIGH DOSE) 0.7 milliLiter(s) IntraMuscular once  insulin glargine Injectable (LANTUS) 8 Unit(s) SubCutaneous at bedtime  insulin lispro (ADMELOG) corrective regimen sliding scale   SubCutaneous at bedtime  insulin lispro (ADMELOG) corrective regimen sliding scale   SubCutaneous three times a day before meals  insulin lispro Injectable (ADMELOG) 7 Unit(s) SubCutaneous three times a day before meals  ketotifen 0.025% Ophthalmic Solution 1 Drop(s) Both EYES two times a day  metoprolol succinate ER 50 milliGRAM(s) Oral daily  mirtazapine 7.5 milliGRAM(s) Oral at bedtime  montelukast 10 milliGRAM(s) Oral daily  nystatin    Suspension 360418 Unit(s) Oral four times a day  pantoprazole    Tablet 40 milliGRAM(s) Oral before breakfast  polyethylene glycol 3350 17 Gram(s) Oral two times a day  potassium phosphate / sodium phosphate Powder (PHOS-NaK) 2 Packet(s) Oral three times a day  senna 2 Tablet(s) Oral at bedtime  sodium chloride 3%  Inhalation 4 milliLiter(s) Inhalation every 12 hours    MEDICATIONS  (PRN):  acetaminophen     Tablet .. 650 milliGRAM(s) Oral every 6 hours PRN Temp greater or equal to 38C (100.4F), Mild Pain (1 - 3)  aluminum hydroxide/magnesium hydroxide/simethicone Suspension 30 milliLiter(s) Oral every 4 hours PRN Dyspepsia  bisacodyl Suppository 10 milliGRAM(s) Rectal daily PRN Constipation  dextrose Oral Gel 15 Gram(s) Oral once PRN Blood Glucose LESS THAN 70 milliGRAM(s)/deciliter  guaifenesin/dextromethorphan Oral Liquid 10 milliLiter(s) Oral every 8 hours PRN Cough  melatonin 3 milliGRAM(s) Oral at bedtime PRN Insomnia  ondansetron Injectable 4 milliGRAM(s) IV Push every 8 hours PRN Nausea and/or Vomiting      LABS:                        11.2   10.91 )-----------( 167      ( 02 Apr 2023 06:30 )             37.1     04-02    130<L>  |  92<L>  |  14  ----------------------------<  157<H>  4.9   |  36<H>  |  0.34<L>    Ca    8.8      02 Apr 2023 06:30  Phos  2.0     04-02  Mg     2.20     04-02    TPro  5.5<L>  /  Alb  2.6<L>  /  TBili  0.2  /  DBili  x   /  AST  28  /  ALT  47<H>  /  AlkPhos  171<H>  04-02          Venous Blood Gas:  04-02 @ 06:30  7.34/78/51/42/83.6  VBG Lactate: 1.0  Venous Blood Gas:  04-01 @ 20:40  7.36/74/60/42/92.1  VBG Lactate: --  Venous Blood Gas:  04-01 @ 18:00  7.36/71/64/40/93.5  VBG Lactate: 1.2      MICROBIOLOGY:     RADIOLOGY:  [ ] Reviewed and interpreted by me    Point of Care Ultrasound Findings:    PFT:    EKG:

## 2023-04-17 ENCOUNTER — OFFICE VISIT (OUTPATIENT)
Dept: INTERNAL MEDICINE | Facility: CLINIC | Age: 28
End: 2023-04-17
Payer: COMMERCIAL

## 2023-04-17 VITALS
HEART RATE: 86 BPM | DIASTOLIC BLOOD PRESSURE: 70 MMHG | OXYGEN SATURATION: 99 % | BODY MASS INDEX: 47.09 KG/M2 | WEIGHT: 293 LBS | SYSTOLIC BLOOD PRESSURE: 110 MMHG | HEIGHT: 66 IN

## 2023-04-17 DIAGNOSIS — R06.2 WHEEZING ON EXPIRATION: Primary | ICD-10-CM

## 2023-04-17 DIAGNOSIS — J06.9 BACTERIAL UPPER RESPIRATORY INFECTION: ICD-10-CM

## 2023-04-17 DIAGNOSIS — B96.89 BACTERIAL UPPER RESPIRATORY INFECTION: ICD-10-CM

## 2023-04-17 PROCEDURE — 94640 PR INHAL RX, AIRWAY OBST/DX SPUTUM INDUCT: ICD-10-PCS | Mod: S$GLB,,,

## 2023-04-17 PROCEDURE — 3078F DIAST BP <80 MM HG: CPT | Mod: CPTII,S$GLB,,

## 2023-04-17 PROCEDURE — 99214 PR OFFICE/OUTPT VISIT, EST, LEVL IV, 30-39 MIN: ICD-10-PCS | Mod: 25,S$GLB,,

## 2023-04-17 PROCEDURE — 99214 OFFICE O/P EST MOD 30 MIN: CPT | Mod: 25,S$GLB,,

## 2023-04-17 PROCEDURE — 3078F PR MOST RECENT DIASTOLIC BLOOD PRESSURE < 80 MM HG: ICD-10-PCS | Mod: CPTII,S$GLB,,

## 2023-04-17 PROCEDURE — 3008F PR BODY MASS INDEX (BMI) DOCUMENTED: ICD-10-PCS | Mod: CPTII,S$GLB,,

## 2023-04-17 PROCEDURE — 99999 PR PBB SHADOW E&M-EST. PATIENT-LVL III: ICD-10-PCS | Mod: PBBFAC,,,

## 2023-04-17 PROCEDURE — 1159F MED LIST DOCD IN RCRD: CPT | Mod: CPTII,S$GLB,,

## 2023-04-17 PROCEDURE — 3008F BODY MASS INDEX DOCD: CPT | Mod: CPTII,S$GLB,,

## 2023-04-17 PROCEDURE — 3074F PR MOST RECENT SYSTOLIC BLOOD PRESSURE < 130 MM HG: ICD-10-PCS | Mod: CPTII,S$GLB,,

## 2023-04-17 PROCEDURE — 3074F SYST BP LT 130 MM HG: CPT | Mod: CPTII,S$GLB,,

## 2023-04-17 PROCEDURE — 99999 PR PBB SHADOW E&M-EST. PATIENT-LVL III: CPT | Mod: PBBFAC,,,

## 2023-04-17 PROCEDURE — 94640 AIRWAY INHALATION TREATMENT: CPT | Mod: S$GLB,,,

## 2023-04-17 PROCEDURE — 1159F PR MEDICATION LIST DOCUMENTED IN MEDICAL RECORD: ICD-10-PCS | Mod: CPTII,S$GLB,,

## 2023-04-17 RX ORDER — ALBUTEROL SULFATE 90 UG/1
2 AEROSOL, METERED RESPIRATORY (INHALATION) EVERY 6 HOURS PRN
Qty: 18 G | Refills: 0 | Status: SHIPPED | OUTPATIENT
Start: 2023-04-17 | End: 2023-04-20

## 2023-04-17 RX ORDER — IPRATROPIUM BROMIDE AND ALBUTEROL SULFATE 2.5; .5 MG/3ML; MG/3ML
3 SOLUTION RESPIRATORY (INHALATION)
Status: COMPLETED | OUTPATIENT
Start: 2023-04-17 | End: 2023-04-17

## 2023-04-17 RX ORDER — AMOXICILLIN AND CLAVULANATE POTASSIUM 875; 125 MG/1; MG/1
1 TABLET, FILM COATED ORAL EVERY 12 HOURS
Qty: 14 TABLET | Refills: 0 | Status: SHIPPED | OUTPATIENT
Start: 2023-04-17 | End: 2023-04-24

## 2023-04-17 RX ADMIN — IPRATROPIUM BROMIDE AND ALBUTEROL SULFATE 3 ML: 2.5; .5 SOLUTION RESPIRATORY (INHALATION) at 05:04

## 2023-04-17 NOTE — PROGRESS NOTES
[facility] Note    Chief Complaint      Chief Complaint   Patient presents with    Sinus Problem    Fatigue    Cough    Dizziness     History of Present Illness      Frankie Garcia is a 27 y.o. female patient of Dr. Hadley who presents today for sinus pain and congestion, cough, body aches, and fatigue x3 weeks. Pt stated she has been having these symptoms as well as ear pain, headaches and generalized weakness.  Pt denies any c/o sore throat, CP, SOB, wheezing, N/V/D/C. She went to urgent care on 23, but thinks she may have had an allergic reaction to the antibiotic given to her as she developed and itchy rash the next day. Pt stopped taking the medication.  Since she was not feeling better, pt came into office today.       Health Maintenance   Topic Date Due    HPV Vaccines (3 - 3-dose series) 2021    Pap Smear  03/15/2022    TETANUS VACCINE  2030    Hepatitis C Screening  Completed    Lipid Panel  Completed       Past Medical History:   Diagnosis Date    Anemia     Cigarette smoker     1ppd    Influenza 2019    Pyelonephritis 2019    Sepsis        Past Surgical History:   Procedure Laterality Date    NO PAST SURGERIES  2019       family history includes Arthritis in her father; Diabetes in her maternal aunt.    Social History     Tobacco Use    Smoking status: Former     Packs/day: 0.07     Years: 1.00     Pack years: 0.07     Types: Cigarettes     Quit date: 2019     Years since quittin.2    Smokeless tobacco: Never   Substance Use Topics    Alcohol use: Yes     Alcohol/week: 0.0 standard drinks     Comment: socially, monthly     Drug use: No       Review of Systems   Constitutional:  Positive for malaise/fatigue. Negative for chills and fever.   HENT:  Positive for congestion, ear pain and sinus pain. Negative for sore throat.    Respiratory:  Positive for cough. Negative for sputum production, shortness of breath and wheezing.    Gastrointestinal:   "Negative for abdominal pain, constipation, diarrhea, nausea and vomiting.   Musculoskeletal:  Positive for myalgias.   Neurological:  Positive for dizziness, weakness and headaches.      Outpatient Encounter Medications as of 4/17/2023   Medication Sig Dispense Refill    docusate sodium (COLACE) 100 MG capsule Take 2 capsules (200 mg total) by mouth 2 (two) times daily as needed for Constipation. 30 capsule 1    ibuprofen (ADVIL,MOTRIN) 600 MG tablet Take 1 tablet (600 mg total) by mouth every 6 (six) hours as needed for Pain. 30 tablet 1    albuterol (VENTOLIN HFA) 90 mcg/actuation inhaler Inhale 2 puffs into the lungs every 6 (six) hours as needed for Wheezing. Rescue 18 g 0    amoxicillin-clavulanate 875-125mg (AUGMENTIN) 875-125 mg per tablet Take 1 tablet by mouth every 12 (twelve) hours. for 7 days 14 tablet 0    ferrous sulfate (SLOW FE ORAL) Take by mouth.      mupirocin (BACTROBAN) 2 % ointment AAA chest bid 30 g 1    valACYclovir (VALTREX) 1000 MG tablet Take 2 pills po bid x 2 days prn first signs of outbreak 30 tablet 3     Facility-Administered Encounter Medications as of 4/17/2023   Medication Dose Route Frequency Provider Last Rate Last Admin    [COMPLETED] albuterol-ipratropium 2.5 mg-0.5 mg/3 mL nebulizer solution 3 mL  3 mL Nebulization 1 time in Clinic/HOD Kathleen Dempsey, LESTER   3 mL at 04/17/23 1707        Review of patient's allergies indicates:   Allergen Reactions    Ciprofloxacin Hives    Sulfamethoxazole-trimethoprim Hives       Physical Exam      Vital Signs  Pulse: 86  SpO2: 99 %  BP: 110/70  BP Location: Right arm  Patient Position: Sitting  Pain Score:   6  Pain Loc: Head  Height and Weight  Height: 5' 6" (167.6 cm)  Weight: (!) 147.7 kg (325 lb 9.9 oz)  BSA (Calculated - sq m): 2.62 sq meters  BMI (Calculated): 52.6  Weight in (lb) to have BMI = 25: 154.6    Physical Exam  Constitutional:       Appearance: She is obese. She is ill-appearing.   HENT:      Head: Normocephalic and " atraumatic.      Salivary Glands: Right salivary gland is not diffusely enlarged or tender. Left salivary gland is not diffusely enlarged or tender.      Right Ear: Ear canal and external ear normal. Tympanic membrane is injected and erythematous. Tympanic membrane has decreased mobility.      Left Ear: Ear canal and external ear normal. Tympanic membrane has decreased mobility.      Nose: Nose normal.      Right Turbinates: Pale.      Left Turbinates: Pale.      Mouth/Throat:      Lips: Pink.      Mouth: Mucous membranes are moist.      Pharynx: Oropharyngeal exudate present.      Tonsils: No tonsillar exudate.   Cardiovascular:      Rate and Rhythm: Normal rate and regular rhythm.      Pulses: Normal pulses.      Heart sounds: Normal heart sounds.   Pulmonary:      Effort: Pulmonary effort is normal.      Breath sounds: Examination of the right-upper field reveals wheezing. Wheezing present.      Comments: Duo-neb administered in clinic; RUL wheezing significantly improved after duo-neb administered  Lymphadenopathy:      Head:      Right side of head: No submental, submandibular, tonsillar, preauricular, posterior auricular or occipital adenopathy.      Left side of head: No submental, submandibular, tonsillar, preauricular, posterior auricular or occipital adenopathy.   Neurological:      Mental Status: She is alert.        Laboratory:  CBC:  Lab Results   Component Value Date    WBC 7.36 06/22/2021    RBC 4.37 06/22/2021    HGB 12.1 06/22/2021    HCT 37.4 06/22/2021     06/22/2021    MCV 86 06/22/2021    MCH 27.7 06/22/2021    MCHC 32.4 06/22/2021    MCHC 34.0 01/27/2021    MCHC 32.1 10/13/2020     CMP:  Lab Results   Component Value Date    GLU 85 06/22/2021    CALCIUM 9.3 06/22/2021    ALBUMIN 3.9 06/22/2021    PROT 7.1 06/22/2021     06/22/2021    K 4.4 06/22/2021    CO2 25 06/22/2021     06/22/2021    BUN 10 06/22/2021    ALKPHOS 95 06/22/2021    ALT 10 06/22/2021    AST 16 06/22/2021     BILITOT 0.5 06/22/2021    BILITOT 0.2 01/27/2021    BILITOT 0.5 10/13/2020     URINALYSIS:  Lab Results   Component Value Date    COLORU Yellow 10/12/2020    CLARITYU Clear 05/09/2019    SPECGRAV 1.025 10/12/2020    PHUR 7.0 10/12/2020    PROTEINUA 2+ (A) 10/12/2020    BACTERIA Few (A) 10/12/2020    NITRITE Negative 10/12/2020    LEUKOCYTESUR 1+ (A) 10/12/2020    UROBILINOGEN Negative 10/12/2020    HYALINECASTS 0 10/12/2020    HYALINECASTS 0 02/18/2019      LIPIDS:  Lab Results   Component Value Date    TSH 1.927 10/13/2020    TSH 2.0 05/22/2007    HDL 48 10/13/2020    CHOL 187 10/13/2020    CHOL 151 05/22/2007    TRIG 103 10/13/2020    LDLCALC 118.4 10/13/2020    CHOLHDL 25.7 10/13/2020    NONHDLCHOL 139 10/13/2020    TOTALCHOLEST 3.9 10/13/2020     TSH:  Lab Results   Component Value Date    TSH 1.927 10/13/2020    TSH 2.0 05/22/2007     A1C:  Lab Results   Component Value Date    HGBA1C 4.9 10/13/2020         Assessment/Plan     Frankie Garcia is a 27 y.o.female with:    Wheezing on expiration  -     albuterol (VENTOLIN HFA) 90 mcg/actuation inhaler; Inhale 2 puffs into the lungs every 6 (six) hours as needed for Wheezing. Rescue  Dispense: 18 g; Refill: 0  -     amoxicillin-clavulanate 875-125mg (AUGMENTIN) 875-125 mg per tablet; Take 1 tablet by mouth every 12 (twelve) hours. for 7 days  Dispense: 14 tablet; Refill: 0    Bacterial upper respiratory infection  -     amoxicillin-clavulanate 875-125mg (AUGMENTIN) 875-125 mg per tablet; Take 1 tablet by mouth every 12 (twelve) hours. for 7 days  Dispense: 14 tablet; Refill: 0    Other orders  -     albuterol-ipratropium 2.5 mg-0.5 mg/3 mL nebulizer solution 3 mL          I spent 35 minutes on the day of this encounter for preparing for, evaluating, treating, and managing this patient.      -Continue current medications and maintain follow up with specialists.  Return to clinic in Follow up in about 3 days (around 4/20/2023).       Dawn R McCloskey, NP [facility]

## 2023-04-17 NOTE — PATIENT INSTRUCTIONS
PLAIN Mucinex 1200mg orally twice a day  Warm salt water gargle  Flonase spray  Throat lozenges  Chloraseptic spray

## 2023-04-20 DIAGNOSIS — B96.89 BACTERIAL UPPER RESPIRATORY INFECTION: Primary | ICD-10-CM

## 2023-04-20 DIAGNOSIS — J06.9 BACTERIAL UPPER RESPIRATORY INFECTION: Primary | ICD-10-CM

## 2023-04-20 RX ORDER — LEVALBUTEROL TARTRATE 45 UG/1
1-2 AEROSOL, METERED ORAL EVERY 4 HOURS PRN
Qty: 15 G | Refills: 0 | Status: SHIPPED | OUTPATIENT
Start: 2023-04-20 | End: 2023-07-13

## 2023-04-27 ENCOUNTER — OFFICE VISIT (OUTPATIENT)
Dept: FAMILY MEDICINE | Facility: CLINIC | Age: 28
End: 2023-04-27
Payer: COMMERCIAL

## 2023-04-27 VITALS
WEIGHT: 293 LBS | OXYGEN SATURATION: 98 % | HEIGHT: 66 IN | TEMPERATURE: 98 F | SYSTOLIC BLOOD PRESSURE: 102 MMHG | BODY MASS INDEX: 47.09 KG/M2 | DIASTOLIC BLOOD PRESSURE: 72 MMHG | HEART RATE: 78 BPM

## 2023-04-27 DIAGNOSIS — Z00.00 HEALTHCARE MAINTENANCE: Primary | ICD-10-CM

## 2023-04-27 DIAGNOSIS — E61.1 IRON DEFICIENCY: ICD-10-CM

## 2023-04-27 DIAGNOSIS — E66.01 CLASS 3 SEVERE OBESITY DUE TO EXCESS CALORIES WITH SERIOUS COMORBIDITY AND BODY MASS INDEX (BMI) OF 50.0 TO 59.9 IN ADULT: ICD-10-CM

## 2023-04-27 PROCEDURE — 3078F PR MOST RECENT DIASTOLIC BLOOD PRESSURE < 80 MM HG: ICD-10-PCS | Mod: CPTII,S$GLB,, | Performed by: INTERNAL MEDICINE

## 2023-04-27 PROCEDURE — 1160F PR REVIEW ALL MEDS BY PRESCRIBER/CLIN PHARMACIST DOCUMENTED: ICD-10-PCS | Mod: CPTII,S$GLB,, | Performed by: INTERNAL MEDICINE

## 2023-04-27 PROCEDURE — 99999 PR PBB SHADOW E&M-EST. PATIENT-LVL IV: CPT | Mod: PBBFAC,,, | Performed by: INTERNAL MEDICINE

## 2023-04-27 PROCEDURE — 99214 PR OFFICE/OUTPT VISIT, EST, LEVL IV, 30-39 MIN: ICD-10-PCS | Mod: S$GLB,,, | Performed by: INTERNAL MEDICINE

## 2023-04-27 PROCEDURE — 1159F MED LIST DOCD IN RCRD: CPT | Mod: CPTII,S$GLB,, | Performed by: INTERNAL MEDICINE

## 2023-04-27 PROCEDURE — 3008F BODY MASS INDEX DOCD: CPT | Mod: CPTII,S$GLB,, | Performed by: INTERNAL MEDICINE

## 2023-04-27 PROCEDURE — 99214 OFFICE O/P EST MOD 30 MIN: CPT | Mod: S$GLB,,, | Performed by: INTERNAL MEDICINE

## 2023-04-27 PROCEDURE — 1160F RVW MEDS BY RX/DR IN RCRD: CPT | Mod: CPTII,S$GLB,, | Performed by: INTERNAL MEDICINE

## 2023-04-27 PROCEDURE — 99999 PR PBB SHADOW E&M-EST. PATIENT-LVL IV: ICD-10-PCS | Mod: PBBFAC,,, | Performed by: INTERNAL MEDICINE

## 2023-04-27 PROCEDURE — 3074F PR MOST RECENT SYSTOLIC BLOOD PRESSURE < 130 MM HG: ICD-10-PCS | Mod: CPTII,S$GLB,, | Performed by: INTERNAL MEDICINE

## 2023-04-27 PROCEDURE — 3074F SYST BP LT 130 MM HG: CPT | Mod: CPTII,S$GLB,, | Performed by: INTERNAL MEDICINE

## 2023-04-27 PROCEDURE — 3008F PR BODY MASS INDEX (BMI) DOCUMENTED: ICD-10-PCS | Mod: CPTII,S$GLB,, | Performed by: INTERNAL MEDICINE

## 2023-04-27 PROCEDURE — 3078F DIAST BP <80 MM HG: CPT | Mod: CPTII,S$GLB,, | Performed by: INTERNAL MEDICINE

## 2023-04-27 PROCEDURE — 1159F PR MEDICATION LIST DOCUMENTED IN MEDICAL RECORD: ICD-10-PCS | Mod: CPTII,S$GLB,, | Performed by: INTERNAL MEDICINE

## 2023-04-27 RX ORDER — TOPIRAMATE 25 MG/1
25 TABLET ORAL 2 TIMES DAILY
Qty: 60 TABLET | Refills: 2 | Status: SHIPPED | OUTPATIENT
Start: 2023-04-27 | End: 2023-07-13

## 2023-04-27 NOTE — PROGRESS NOTES
HISTORY OF PRESENT ILLNESS:  Frankie Garcia is a 27 y.o. female who presents to the clinic today for Establish Care    My first encounter with patient.    Obesity, Hepatic steatosis  Notes steatosis on u/s .  Weight today is 321 lb from 299 lb in 2021.  Intermittently making efforts for diet but no exercising at present.  Working and going to school and has a young child.  Time at times is constraint to seeking healthier eating options or exercise but she endorses motivation to make efforts for weight loss.    PAST MEDICAL HISTORY:  Past Medical History:   Diagnosis Date    Anemia     Cigarette smoker     1ppd    Influenza 2019    Pyelonephritis 2019    sepsis       PAST SURGICAL HISTORY:  Past Surgical History:   Procedure Laterality Date    NO PAST SURGERIES  2019       SOCIAL HISTORY:  Social History     Socioeconomic History    Marital status: Single    Number of children: 1   Tobacco Use    Smoking status: Former     Packs/day: 0.10     Years: 2.00     Pack years: 0.20     Types: Cigarettes     Quit date: 2019     Years since quittin.2    Smokeless tobacco: Never   Substance and Sexual Activity    Alcohol use: Yes     Comment: socially, monthly  - 2 drinks per month    Drug use: No    Sexual activity: Not Currently     Partners: Female, Male     Birth control/protection: None   Other Topics Concern    Are you pregnant or think you may be? Yes     Comment: 31 weeks pregnant almost 34    Breast-feeding No       FAMILY HISTORY:  Family History   Problem Relation Age of Onset    No Known Problems Mother     Arthritis Father     Thyroid disease Father     No Known Problems Sister     No Known Problems Brother     Thyroid disease Maternal Grandmother     Arthritis Maternal Grandfather     Thyroid disease Paternal Grandmother     Diabetes Maternal Aunt     Breast cancer Neg Hx     Colon cancer Neg Hx     Ovarian cancer Neg Hx     Melanoma Neg Hx        ALLERGIES AND MEDICATIONS:  updated and reviewed.  Review of patient's allergies indicates:   Allergen Reactions    Ciprofloxacin Hives    Sulfamethoxazole-trimethoprim Hives    Doxycycline      Swelling and rash to face     Medication List with Changes/Refills   New Medications    TOPIRAMATE (TOPAMAX) 25 MG TABLET    Take 1 tablet (25 mg total) by mouth 2 (two) times daily.   Current Medications    DOCUSATE SODIUM (COLACE) 100 MG CAPSULE    Take 2 capsules (200 mg total) by mouth 2 (two) times daily as needed for Constipation.    IBUPROFEN (ADVIL,MOTRIN) 600 MG TABLET    Take 1 tablet (600 mg total) by mouth every 6 (six) hours as needed for Pain.    LEVALBUTEROL (XOPENEX HFA) 45 MCG/ACTUATION INHALER    Inhale 1-2 puffs into the lungs every 4 (four) hours as needed for Wheezing. Rescue   Discontinued Medications    FERROUS SULFATE (SLOW FE ORAL)    Take by mouth.    MUPIROCIN (BACTROBAN) 2 % OINTMENT    AAA chest bid    VALACYCLOVIR (VALTREX) 1000 MG TABLET    Take 2 pills po bid x 2 days prn first signs of outbreak          CARE TEAM:  Patient Care Team:  Jelani Nolan MD as PCP - General (Internal Medicine)  Shagufta Vang MA (Inactive) as Care Coordinator         REVIEW OF SYSTEMS:  Review of Systems   Constitutional:  Negative for chills and fever.   HENT:  Negative for congestion and postnasal drip.    Eyes:  Negative for photophobia and visual disturbance.   Respiratory:  Negative for cough and shortness of breath.    Cardiovascular:  Negative for chest pain and palpitations.   Gastrointestinal:  Negative for nausea and vomiting.   Genitourinary:  Negative for dysuria and frequency.   Musculoskeletal:  Negative for arthralgias and back pain.   Neurological:  Negative for light-headedness and headaches.   Psychiatric/Behavioral:  Negative for dysphoric mood. The patient is not nervous/anxious.        PHYSICAL EXAM:   Vitals:    04/27/23 1530   BP: 102/72   Pulse: 78   Temp: 98.2 °F (36.8 °C)             Body mass index is 51.95  kg/m².     General appearance - alert, well appearing, and in no distress, oriented to person, place, and time, and morbidly obese  Mental status - alert, oriented to person, place, and time  Eyes - pupils equal and reactive, extraocular eye movements intact  Chest - clear to auscultation, no wheezes, rales or rhonchi, symmetric air entry  Heart - normal rate, regular rhythm, normal S1, S2, no murmurs, rubs, clicks or gallops  Neurological - alert, oriented, normal speech, no focal findings or movement disorder noted  Extremities - peripheral pulses normal, no pedal edema, no clubbing or cyanosis      ASSESSMENT AND PLAN:  Healthcare maintenance  -     Hemoglobin A1C; Future; Expected date: 04/27/2023  -     Comprehensive Metabolic Panel; Future; Expected date: 04/27/2023  -     Lipid Panel; Future; Expected date: 04/27/2023  -     CBC Auto Differential; Future; Expected date: 04/27/2023  -     TSH; Future; Expected date: 04/27/2023  -     Vitamin D; Future; Expected date: 04/27/2023  -     Ambulatory referral/consult to Obstetrics / Gynecology; Future; Expected date: 05/04/2023    Iron deficiency  -     CBC Auto Differential; Future; Expected date: 04/27/2023  -     Ferritin; Future; Expected date: 04/27/2023  -     Iron and TIBC; Future; Expected date: 04/27/2023    Class 3 severe obesity due to excess calories with serious comorbidity and body mass index (BMI) of 50.0 to 59.9 in adult  -     topiramate (TOPAMAX) 25 MG tablet; Take 1 tablet (25 mg total) by mouth 2 (two) times daily.  Dispense: 60 tablet; Refill: 2  - Discussed steps including healthy Mediterranean style diet, portion control, food diary.  Advised for exercise at least 30 min at least 5 times per week.  - Topiramate potential side effects discussed.              Follow up 3 months or sooner as needed.

## 2023-04-28 ENCOUNTER — LAB VISIT (OUTPATIENT)
Dept: LAB | Facility: HOSPITAL | Age: 28
End: 2023-04-28
Attending: INTERNAL MEDICINE
Payer: COMMERCIAL

## 2023-04-28 DIAGNOSIS — E61.1 IRON DEFICIENCY: ICD-10-CM

## 2023-04-28 DIAGNOSIS — Z00.00 HEALTHCARE MAINTENANCE: ICD-10-CM

## 2023-04-28 LAB
25(OH)D3+25(OH)D2 SERPL-MCNC: 17 NG/ML (ref 30–96)
ALBUMIN SERPL BCP-MCNC: 3.9 G/DL (ref 3.5–5.2)
ALP SERPL-CCNC: 80 U/L (ref 55–135)
ALT SERPL W/O P-5'-P-CCNC: 16 U/L (ref 10–44)
ANION GAP SERPL CALC-SCNC: 11 MMOL/L (ref 8–16)
AST SERPL-CCNC: 15 U/L (ref 10–40)
BASOPHILS # BLD AUTO: 0.05 K/UL (ref 0–0.2)
BASOPHILS NFR BLD: 0.8 % (ref 0–1.9)
BILIRUB SERPL-MCNC: 0.5 MG/DL (ref 0.1–1)
BUN SERPL-MCNC: 13 MG/DL (ref 6–20)
CALCIUM SERPL-MCNC: 9.6 MG/DL (ref 8.7–10.5)
CHLORIDE SERPL-SCNC: 107 MMOL/L (ref 95–110)
CHOLEST SERPL-MCNC: 194 MG/DL (ref 120–199)
CHOLEST/HDLC SERPL: 4.5 {RATIO} (ref 2–5)
CO2 SERPL-SCNC: 21 MMOL/L (ref 23–29)
CREAT SERPL-MCNC: 0.8 MG/DL (ref 0.5–1.4)
DIFFERENTIAL METHOD: ABNORMAL
EOSINOPHIL # BLD AUTO: 0.2 K/UL (ref 0–0.5)
EOSINOPHIL NFR BLD: 2.4 % (ref 0–8)
ERYTHROCYTE [DISTWIDTH] IN BLOOD BY AUTOMATED COUNT: 13.1 % (ref 11.5–14.5)
EST. GFR  (NO RACE VARIABLE): >60 ML/MIN/1.73 M^2
ESTIMATED AVG GLUCOSE: 97 MG/DL (ref 68–131)
FERRITIN SERPL-MCNC: 20 NG/ML (ref 20–300)
GLUCOSE SERPL-MCNC: 79 MG/DL (ref 70–110)
HBA1C MFR BLD: 5 % (ref 4–5.6)
HCT VFR BLD AUTO: 36.5 % (ref 37–48.5)
HDLC SERPL-MCNC: 43 MG/DL (ref 40–75)
HDLC SERPL: 22.2 % (ref 20–50)
HGB BLD-MCNC: 11.8 G/DL (ref 12–16)
IMM GRANULOCYTES # BLD AUTO: 0.02 K/UL (ref 0–0.04)
IMM GRANULOCYTES NFR BLD AUTO: 0.3 % (ref 0–0.5)
IRON SERPL-MCNC: 60 UG/DL (ref 30–160)
LDLC SERPL CALC-MCNC: 130.4 MG/DL (ref 63–159)
LYMPHOCYTES # BLD AUTO: 1.8 K/UL (ref 1–4.8)
LYMPHOCYTES NFR BLD: 28.3 % (ref 18–48)
MCH RBC QN AUTO: 28 PG (ref 27–31)
MCHC RBC AUTO-ENTMCNC: 32.3 G/DL (ref 32–36)
MCV RBC AUTO: 87 FL (ref 82–98)
MONOCYTES # BLD AUTO: 0.4 K/UL (ref 0.3–1)
MONOCYTES NFR BLD: 6 % (ref 4–15)
NEUTROPHILS # BLD AUTO: 3.9 K/UL (ref 1.8–7.7)
NEUTROPHILS NFR BLD: 62.2 % (ref 38–73)
NONHDLC SERPL-MCNC: 151 MG/DL
NRBC BLD-RTO: 0 /100 WBC
PLATELET # BLD AUTO: 247 K/UL (ref 150–450)
PMV BLD AUTO: 10.4 FL (ref 9.2–12.9)
POTASSIUM SERPL-SCNC: 3.8 MMOL/L (ref 3.5–5.1)
PROT SERPL-MCNC: 6.8 G/DL (ref 6–8.4)
RBC # BLD AUTO: 4.22 M/UL (ref 4–5.4)
SATURATED IRON: 17 % (ref 20–50)
SODIUM SERPL-SCNC: 139 MMOL/L (ref 136–145)
TOTAL IRON BINDING CAPACITY: 348 UG/DL (ref 250–450)
TRANSFERRIN SERPL-MCNC: 235 MG/DL (ref 200–375)
TRIGL SERPL-MCNC: 103 MG/DL (ref 30–150)
TSH SERPL DL<=0.005 MIU/L-ACNC: 2.18 UIU/ML (ref 0.4–4)
WBC # BLD AUTO: 6.29 K/UL (ref 3.9–12.7)

## 2023-04-28 PROCEDURE — 82728 ASSAY OF FERRITIN: CPT | Performed by: INTERNAL MEDICINE

## 2023-04-28 PROCEDURE — 85025 COMPLETE CBC W/AUTO DIFF WBC: CPT | Performed by: INTERNAL MEDICINE

## 2023-04-28 PROCEDURE — 80053 COMPREHEN METABOLIC PANEL: CPT | Performed by: INTERNAL MEDICINE

## 2023-04-28 PROCEDURE — 36415 COLL VENOUS BLD VENIPUNCTURE: CPT | Mod: PO | Performed by: INTERNAL MEDICINE

## 2023-04-28 PROCEDURE — 80061 LIPID PANEL: CPT | Performed by: INTERNAL MEDICINE

## 2023-04-28 PROCEDURE — 84466 ASSAY OF TRANSFERRIN: CPT | Performed by: INTERNAL MEDICINE

## 2023-04-28 PROCEDURE — 83036 HEMOGLOBIN GLYCOSYLATED A1C: CPT | Performed by: INTERNAL MEDICINE

## 2023-04-28 PROCEDURE — 82306 VITAMIN D 25 HYDROXY: CPT | Performed by: INTERNAL MEDICINE

## 2023-04-28 PROCEDURE — 84443 ASSAY THYROID STIM HORMONE: CPT | Performed by: INTERNAL MEDICINE

## 2023-05-02 ENCOUNTER — TELEPHONE (OUTPATIENT)
Dept: FAMILY MEDICINE | Facility: CLINIC | Age: 28
End: 2023-05-02
Payer: COMMERCIAL

## 2023-05-02 DIAGNOSIS — E55.9 VITAMIN D DEFICIENCY: Primary | ICD-10-CM

## 2023-05-02 RX ORDER — ERGOCALCIFEROL 1.25 MG/1
50000 CAPSULE ORAL
Qty: 16 CAPSULE | Refills: 0 | Status: SHIPPED | OUTPATIENT
Start: 2023-05-02 | End: 2023-07-13

## 2023-05-02 NOTE — TELEPHONE ENCOUNTER
Left message just to notify the call you already have a gyn and is not interested in establishing with a new provider .

## 2023-05-02 NOTE — TELEPHONE ENCOUNTER
----- Message from Caesar Edmonds sent at 5/2/2023 11:33 AM CDT -----  Regarding: Self [phone]  Type: Patient Call Back    Who called: Self     What is the request in detail: stated she keeps getting calls about an OBGYN referral, stating she already has an OBGYN somewhere else and doesn't want to see one at [facility].     [facility]     Would the patient rather a call back or a response via [facility]? Call back     Best call back number: [phone]     Additional Information:    Thank you.

## 2023-07-13 ENCOUNTER — OFFICE VISIT (OUTPATIENT)
Dept: OBSTETRICS AND GYNECOLOGY | Facility: CLINIC | Age: 28
End: 2023-07-13
Payer: COMMERCIAL

## 2023-07-13 VITALS
SYSTOLIC BLOOD PRESSURE: 110 MMHG | WEIGHT: 293 LBS | BODY MASS INDEX: 47.09 KG/M2 | HEIGHT: 66 IN | DIASTOLIC BLOOD PRESSURE: 70 MMHG

## 2023-07-13 DIAGNOSIS — Z01.419 WOMEN'S ANNUAL ROUTINE GYNECOLOGICAL EXAMINATION: Primary | ICD-10-CM

## 2023-07-13 DIAGNOSIS — N89.8 VAGINAL ODOR: ICD-10-CM

## 2023-07-13 DIAGNOSIS — Z12.4 ENCOUNTER FOR PAPANICOLAOU SMEAR FOR CERVICAL CANCER SCREENING: ICD-10-CM

## 2023-07-13 PROBLEM — F17.210 CIGARETTE SMOKER: Chronic | Status: RESOLVED | Noted: 2019-02-18 | Resolved: 2023-07-13

## 2023-07-13 PROCEDURE — 3074F PR MOST RECENT SYSTOLIC BLOOD PRESSURE < 130 MM HG: ICD-10-PCS | Mod: CPTII,S$GLB,, | Performed by: NURSE PRACTITIONER

## 2023-07-13 PROCEDURE — 99395 PREV VISIT EST AGE 18-39: CPT | Mod: S$GLB,,, | Performed by: NURSE PRACTITIONER

## 2023-07-13 PROCEDURE — 3008F BODY MASS INDEX DOCD: CPT | Mod: CPTII,S$GLB,, | Performed by: NURSE PRACTITIONER

## 2023-07-13 PROCEDURE — 99999 PR PBB SHADOW E&M-EST. PATIENT-LVL III: CPT | Mod: PBBFAC,,, | Performed by: NURSE PRACTITIONER

## 2023-07-13 PROCEDURE — 88175 CYTOPATH C/V AUTO FLUID REDO: CPT | Performed by: NURSE PRACTITIONER

## 2023-07-13 PROCEDURE — 3044F PR MOST RECENT HEMOGLOBIN A1C LEVEL <7.0%: ICD-10-PCS | Mod: CPTII,S$GLB,, | Performed by: NURSE PRACTITIONER

## 2023-07-13 PROCEDURE — 3078F PR MOST RECENT DIASTOLIC BLOOD PRESSURE < 80 MM HG: ICD-10-PCS | Mod: CPTII,S$GLB,, | Performed by: NURSE PRACTITIONER

## 2023-07-13 PROCEDURE — 3008F PR BODY MASS INDEX (BMI) DOCUMENTED: ICD-10-PCS | Mod: CPTII,S$GLB,, | Performed by: NURSE PRACTITIONER

## 2023-07-13 PROCEDURE — 99999 PR PBB SHADOW E&M-EST. PATIENT-LVL III: ICD-10-PCS | Mod: PBBFAC,,, | Performed by: NURSE PRACTITIONER

## 2023-07-13 PROCEDURE — 3074F SYST BP LT 130 MM HG: CPT | Mod: CPTII,S$GLB,, | Performed by: NURSE PRACTITIONER

## 2023-07-13 PROCEDURE — 87086 URINE CULTURE/COLONY COUNT: CPT | Performed by: NURSE PRACTITIONER

## 2023-07-13 PROCEDURE — 1159F PR MEDICATION LIST DOCUMENTED IN MEDICAL RECORD: ICD-10-PCS | Mod: CPTII,S$GLB,, | Performed by: NURSE PRACTITIONER

## 2023-07-13 PROCEDURE — 1159F MED LIST DOCD IN RCRD: CPT | Mod: CPTII,S$GLB,, | Performed by: NURSE PRACTITIONER

## 2023-07-13 PROCEDURE — 99395 PR PREVENTIVE VISIT,EST,18-39: ICD-10-PCS | Mod: S$GLB,,, | Performed by: NURSE PRACTITIONER

## 2023-07-13 PROCEDURE — 3044F HG A1C LEVEL LT 7.0%: CPT | Mod: CPTII,S$GLB,, | Performed by: NURSE PRACTITIONER

## 2023-07-13 PROCEDURE — 81514 NFCT DS BV&VAGINITIS DNA ALG: CPT | Performed by: NURSE PRACTITIONER

## 2023-07-13 PROCEDURE — 3078F DIAST BP <80 MM HG: CPT | Mod: CPTII,S$GLB,, | Performed by: NURSE PRACTITIONER

## 2023-07-13 NOTE — PROGRESS NOTES
CC: Annual  HPI: Pt is a 28 y.o.  female who presents for routine annual exam. Her son is 3 yo. She is in school for criminal justice and still working at the [employer]. She is not currently using contraception. She is not currently having sex.  She reports occasional vaginal odor. She has h/o sepsis after UTI prior to her pregnancy. She has h/o menstrual migraines- both before and after cycle. She has associated aura.  The patient participates in regular exercise: needs more.  She has started eating better.   The patient does not smoke.  The patient wears seatbelts.   Pt denies any domestic violence.    ROS:  GENERAL: Feeling well overall. Denies fever or chills.   SKIN: Denies rash or lesions.   HEAD: Denies head injury or headache.   NODES: Denies enlarged lymph nodes.   CHEST: Denies chest pain or shortness of breath.   CARDIOVASCULAR: Denies palpitations or left sided chest pain.   ABDOMEN: No abdominal pain, constipation, diarrhea, nausea, vomiting or rectal bleeding.   URINARY: No dysuria, hematuria, or burning on urination.  REPRODUCTIVE: See HPI.   BREASTS: Denies pain, lumps, or nipple discharge.   HEMATOLOGIC: No easy bruisability or excessive bleeding.   MUSCULOSKELETAL: Denies joint pain or swelling.   NEUROLOGIC: Denies syncope or weakness.   PSYCHIATRIC: Denies depression, anxiety or mood swings.    PE:   APPEARANCE: Well nourished, well developed, White female in no acute distress.  NODES: no cervical, supraclavicular, or inguinal lymphadenopathy  BREASTS: Symmetrical, no skin changes or visible lesions. No palpable masses, nipple discharge or adenopathy bilaterally.  ABDOMEN: Soft. No tenderness or masses. No distention. No hernias palpated. No CVA tenderness.  VULVA: No lesions. Normal external female genitalia.  URETHRAL MEATUS: Normal size and location, no lesions, no prolapse.  URETHRA: No masses, tenderness, or prolapse.  VAGINA: Moist. No lesions or lacerations noted. No abnormal discharge  present. No odor present.   CERVIX: No lesions or discharge. No cervical motion tenderness.   UTERUS: Normal size, regular shape, mobile, non-tender.  ADNEXA: No tenderness. No fullness or masses palpated in the adnexal regions.   ANUS PERINEUM: Normal.      Diagnosis:  1. Women's annual routine gynecological examination    2. Encounter for Papanicolaou smear for cervical cancer screening    3. Vaginal odor        Plan:   Pap  Urine cx  Vaginosis cx  Patient was counseled today on contraceptive options: barrier, hormonal ( Depo-Provera, NuvaRing, Nexplanon), IUDs (Mirena, ParaGard), etc.  Disucssed due to her h/o migraines with aura - estrogen containing meds are contraindicated   Can trial progesterone only Slynd as an alternative.   Discussed weight loss with diet and exercise with emphasis on potion control, good food choices and logging of nutrition  reviewed macronutrient option for nutritional guidance     Orders Placed This Encounter    VAGINOSIS SCREEN BY DNA PROBE    Urine culture    Liquid-Based Pap Smear, Screening       Patient was counseled today on the new ACS guidelines for cervical cytology screening as well as the current recommendations for breast cancer screening. She was counseled to follow up with her PCP for other routine health maintenance. Counseling session lasted approximately 10 minutes, and all her questions were answered.    Follow-up with me in 1 year for routine exam    JORDEN Omalley

## 2023-07-14 LAB
BACTERIA UR CULT: NO GROWTH
BACTERIAL VAGINOSIS DNA: NEGATIVE
CANDIDA GLABRATA DNA: NEGATIVE
CANDIDA KRUSEI DNA: NEGATIVE
CANDIDA RRNA VAG QL PROBE: NEGATIVE
T VAGINALIS RRNA GENITAL QL PROBE: NEGATIVE

## 2023-07-17 ENCOUNTER — TELEPHONE (OUTPATIENT)
Dept: FAMILY MEDICINE | Facility: CLINIC | Age: 28
End: 2023-07-17
Payer: COMMERCIAL

## 2023-07-17 NOTE — TELEPHONE ENCOUNTER
No answer. Left message for Patient to return call to clinic to reschedule appt provider out of the office. provider canceled clinic reschedule for 8/31/23

## 2023-07-21 LAB
FINAL PATHOLOGIC DIAGNOSIS: NORMAL
Lab: NORMAL

## 2023-07-31 ENCOUNTER — TELEPHONE (OUTPATIENT)
Dept: FAMILY MEDICINE | Facility: CLINIC | Age: 28
End: 2023-07-31
Payer: COMMERCIAL

## 2023-07-31 ENCOUNTER — PATIENT MESSAGE (OUTPATIENT)
Dept: FAMILY MEDICINE | Facility: CLINIC | Age: 28
End: 2023-07-31
Payer: COMMERCIAL

## 2023-08-07 ENCOUNTER — OFFICE VISIT (OUTPATIENT)
Dept: FAMILY MEDICINE | Facility: CLINIC | Age: 28
End: 2023-08-07
Payer: COMMERCIAL

## 2023-08-07 VITALS
WEIGHT: 293 LBS | HEART RATE: 70 BPM | SYSTOLIC BLOOD PRESSURE: 110 MMHG | TEMPERATURE: 98 F | BODY MASS INDEX: 47.09 KG/M2 | HEIGHT: 66 IN | DIASTOLIC BLOOD PRESSURE: 72 MMHG | OXYGEN SATURATION: 98 %

## 2023-08-07 DIAGNOSIS — M54.6 CHRONIC LEFT-SIDED THORACIC BACK PAIN: ICD-10-CM

## 2023-08-07 DIAGNOSIS — K59.00 CONSTIPATION, UNSPECIFIED CONSTIPATION TYPE: ICD-10-CM

## 2023-08-07 DIAGNOSIS — R10.12 LEFT UPPER QUADRANT ABDOMINAL PAIN: Primary | ICD-10-CM

## 2023-08-07 DIAGNOSIS — G89.29 CHRONIC LEFT-SIDED THORACIC BACK PAIN: ICD-10-CM

## 2023-08-07 DIAGNOSIS — R19.5 CHANGE IN CONSISTENCY OF STOOL: ICD-10-CM

## 2023-08-07 PROCEDURE — 1159F PR MEDICATION LIST DOCUMENTED IN MEDICAL RECORD: ICD-10-PCS | Mod: CPTII,S$GLB,, | Performed by: NURSE PRACTITIONER

## 2023-08-07 PROCEDURE — 3074F PR MOST RECENT SYSTOLIC BLOOD PRESSURE < 130 MM HG: ICD-10-PCS | Mod: CPTII,S$GLB,, | Performed by: NURSE PRACTITIONER

## 2023-08-07 PROCEDURE — 99214 OFFICE O/P EST MOD 30 MIN: CPT | Mod: S$GLB,,, | Performed by: NURSE PRACTITIONER

## 2023-08-07 PROCEDURE — 1160F PR REVIEW ALL MEDS BY PRESCRIBER/CLIN PHARMACIST DOCUMENTED: ICD-10-PCS | Mod: CPTII,S$GLB,, | Performed by: NURSE PRACTITIONER

## 2023-08-07 PROCEDURE — 99999 PR PBB SHADOW E&M-EST. PATIENT-LVL V: CPT | Mod: PBBFAC,,, | Performed by: NURSE PRACTITIONER

## 2023-08-07 PROCEDURE — 1159F MED LIST DOCD IN RCRD: CPT | Mod: CPTII,S$GLB,, | Performed by: NURSE PRACTITIONER

## 2023-08-07 PROCEDURE — 3044F PR MOST RECENT HEMOGLOBIN A1C LEVEL <7.0%: ICD-10-PCS | Mod: CPTII,S$GLB,, | Performed by: NURSE PRACTITIONER

## 2023-08-07 PROCEDURE — 3008F BODY MASS INDEX DOCD: CPT | Mod: CPTII,S$GLB,, | Performed by: NURSE PRACTITIONER

## 2023-08-07 PROCEDURE — 3078F PR MOST RECENT DIASTOLIC BLOOD PRESSURE < 80 MM HG: ICD-10-PCS | Mod: CPTII,S$GLB,, | Performed by: NURSE PRACTITIONER

## 2023-08-07 PROCEDURE — 3044F HG A1C LEVEL LT 7.0%: CPT | Mod: CPTII,S$GLB,, | Performed by: NURSE PRACTITIONER

## 2023-08-07 PROCEDURE — 99999 PR PBB SHADOW E&M-EST. PATIENT-LVL V: ICD-10-PCS | Mod: PBBFAC,,, | Performed by: NURSE PRACTITIONER

## 2023-08-07 PROCEDURE — 1160F RVW MEDS BY RX/DR IN RCRD: CPT | Mod: CPTII,S$GLB,, | Performed by: NURSE PRACTITIONER

## 2023-08-07 PROCEDURE — 99214 PR OFFICE/OUTPT VISIT, EST, LEVL IV, 30-39 MIN: ICD-10-PCS | Mod: S$GLB,,, | Performed by: NURSE PRACTITIONER

## 2023-08-07 PROCEDURE — 3074F SYST BP LT 130 MM HG: CPT | Mod: CPTII,S$GLB,, | Performed by: NURSE PRACTITIONER

## 2023-08-07 PROCEDURE — 3078F DIAST BP <80 MM HG: CPT | Mod: CPTII,S$GLB,, | Performed by: NURSE PRACTITIONER

## 2023-08-07 PROCEDURE — 3008F PR BODY MASS INDEX (BMI) DOCUMENTED: ICD-10-PCS | Mod: CPTII,S$GLB,, | Performed by: NURSE PRACTITIONER

## 2023-08-07 RX ORDER — TIZANIDINE 4 MG/1
4 TABLET ORAL EVERY 8 HOURS PRN
Qty: 30 TABLET | Refills: 0 | Status: SHIPPED | OUTPATIENT
Start: 2023-08-07 | End: 2023-08-17

## 2023-08-07 NOTE — PROGRESS NOTES
Routine Office Visit    Patient Name: Frankie Garcia    : 1995  MRN: [id number]    Chief Complaint:  Back pain/abdominal pain    Subjective:  Frankie is a 28 y.o. female who presents today for:    Back pain/abdominal pain:    Patient who is new to me with the below documented medical history reports today for a few concerns.      For the last several months she has been having issues with chronic constipation alternating with diarrhea.  She also has an intermittent left upper quadrant dull and numbing pain without any specific pattern.  She will have to take MiraLax for the constipation 1 to 2 times a week which does cause a bowel movement in 2 days, however after that she will develop constipation again.  +bloating.  She has lost about 20 lb in the past 6 months due to diet and exercise and has not noticed any difference in her symptoms with weight loss.  She denies any history of gastrointestinal diseases.  Denies any abdominal surgeries.  Denies any fevers, chills, or vomiting.  She does have some mild intermittent nausea but this is not significant.  She denies any mucus or blood in stool.    She has also been having some left-sided thoracic back pain for the past 2 months at least without any specific inciting event or injury.  She does have some tenderness at that area.  She took a family members leftover muscle relaxer with some benefit.  No numbness or tingling.    Past Medical History  Past Medical History:   Diagnosis Date    Anemia     Influenza 2019    Pyelonephritis 2019    sepsis       Past Surgical History  Past Surgical History:   Procedure Laterality Date    NO PAST SURGERIES  2019       Family History  Family History   Problem Relation Age of Onset    No Known Problems Mother     Arthritis Father     Thyroid disease Father     No Known Problems Sister     No Known Problems Brother     Thyroid disease Maternal Grandmother     Arthritis Maternal Grandfather     Thyroid disease  Paternal Grandmother     Diabetes Maternal Aunt     Breast cancer Neg Hx     Colon cancer Neg Hx     Ovarian cancer Neg Hx     Melanoma Neg Hx        Social History  Social History     Socioeconomic History    Marital status: Single    Number of children: 1   Tobacco Use    Smoking status: Former     Current packs/day: 0.00     Average packs/day: 0.1 packs/day for 2.0 years (0.2 ttl pk-yrs)     Types: Cigarettes     Start date: 2017     Quit date: 2019     Years since quittin.5    Smokeless tobacco: Never   Substance and Sexual Activity    Alcohol use: Not Currently     Comment: socially, monthly  - 2 drinks per month    Drug use: No    Sexual activity: Not Currently     Partners: Female, Male     Birth control/protection: None   Other Topics Concern    Are you pregnant or think you may be? Yes     Comment: 31 weeks pregnant almost 34    Breast-feeding No       Current Medications  Current Outpatient Medications on File Prior to Visit   Medication Sig Dispense Refill    ibuprofen (ADVIL,MOTRIN) 600 MG tablet Take 1 tablet (600 mg total) by mouth every 6 (six) hours as needed for Pain. 30 tablet 1     No current facility-administered medications on file prior to visit.       Allergies   Review of patient's allergies indicates:   Allergen Reactions    Ciprofloxacin Hives    Sulfamethoxazole-trimethoprim Hives    Doxycycline      Swelling and rash to face       Review of Systems (Pertinent positives)  Review of Systems   Constitutional:  Negative for fever.   HENT: Negative.     Eyes: Negative.    Respiratory: Negative.     Cardiovascular: Negative.    Gastrointestinal:  Positive for abdominal pain, constipation, diarrhea and nausea. Negative for blood in stool, heartburn, melena and vomiting.   Genitourinary: Negative.    Musculoskeletal:  Positive for back pain. Negative for falls, joint pain, myalgias and neck pain.   Skin: Negative.    Neurological: Negative.    Endo/Heme/Allergies: Negative.   "  Psychiatric/Behavioral: Negative.         /72 (BP Location: Right arm, Patient Position: Sitting, BP Method: X-Large (Manual))   Pulse 70   Temp 98.2 °F (36.8 °C) (Oral)   Ht 5' 6" (1.676 m)   Wt (!) 137.7 kg (303 lb 9.2 oz)   SpO2 98%   BMI 49.00 kg/m²     Physical Exam  Vitals reviewed.   Constitutional:       General: She is not in acute distress.     Appearance: Normal appearance. She is not ill-appearing, toxic-appearing or diaphoretic.   HENT:      Head: Normocephalic and atraumatic.   Cardiovascular:      Rate and Rhythm: Normal rate and regular rhythm.      Pulses: Normal pulses.      Heart sounds: Normal heart sounds.   Pulmonary:      Effort: Pulmonary effort is normal. No respiratory distress.      Breath sounds: Normal breath sounds. No wheezing.   Abdominal:      General: Bowel sounds are normal. There is no distension.      Palpations: Abdomen is soft.      Tenderness: There is no abdominal tenderness.   Musculoskeletal:         General: No swelling or deformity. Normal range of motion.      Cervical back: Normal.      Thoracic back: Tenderness (Left-sided paraspinous musculature) present. No swelling, edema or bony tenderness.      Lumbar back: Normal.   Skin:     General: Skin is warm and dry.      Capillary Refill: Capillary refill takes less than 2 seconds.   Neurological:      General: No focal deficit present.      Mental Status: She is alert and oriented to person, place, and time.   Psychiatric:         Mood and Affect: Mood normal.         Behavior: Behavior normal.        Assessment/Plan:  Frankie Garcia is a 28 y.o. female who presents today for :    Frankie was seen today for abdominal pain and back spasm.    Diagnoses and all orders for this visit:    Left upper quadrant abdominal pain  -     CBC W/ AUTO DIFFERENTIAL; Future  -     COMPREHENSIVE METABOLIC PANEL; Future  -     CT Abdomen Pelvis With Contrast; Future    We discussed potential workup and causes of her pain and " constipation.  Patient is worried about this so will check non stat abdominal CT with labs prior.    Constipation, unspecified constipation type  -     Ambulatory referral/consult to Gastroenterology; Future    Patient likely may need colonoscopy given longstanding history of diarrhea/constipation.  Will consult Gastroenterology.  She can take stool softeners over-the-counter for constipation.  Increase fiber in the diet can help with looser stools.    Change in consistency of stool  -     Ambulatory referral/consult to Gastroenterology; Future    As above.    Chronic left-sided thoracic back pain  -     X-Ray Thoracic Spine AP Lateral; Future  -     tiZANidine (ZANAFLEX) 4 MG tablet; Take 1 tablet (4 mg total) by mouth every 8 (eight) hours as needed (back pain).    No alarming signs or symptoms.  Treat with p.r.n. muscle relaxer.  Check x-ray.  Can consider physical therapy referral or pain management if needed.    BMI 45.0-49.9, adult    Congratulated patient regarding efforts to lose weight.        This office note has been dictated.  This dictation has been generated using M-Modal Fluency Direct dictation; some phonetic errors may occur.   My collaborating physician is Dr. Tony Richmond.

## 2023-08-08 ENCOUNTER — LAB VISIT (OUTPATIENT)
Dept: LAB | Facility: HOSPITAL | Age: 28
End: 2023-08-08
Attending: INTERNAL MEDICINE
Payer: COMMERCIAL

## 2023-08-08 ENCOUNTER — OFFICE VISIT (OUTPATIENT)
Dept: GASTROENTEROLOGY | Facility: CLINIC | Age: 28
End: 2023-08-08
Payer: COMMERCIAL

## 2023-08-08 VITALS
DIASTOLIC BLOOD PRESSURE: 70 MMHG | WEIGHT: 293 LBS | SYSTOLIC BLOOD PRESSURE: 105 MMHG | BODY MASS INDEX: 47.09 KG/M2 | HEART RATE: 82 BPM | HEIGHT: 66 IN

## 2023-08-08 DIAGNOSIS — R19.8 IRREGULAR BOWEL HABITS: Primary | ICD-10-CM

## 2023-08-08 DIAGNOSIS — R19.8 IRREGULAR BOWEL HABITS: ICD-10-CM

## 2023-08-08 DIAGNOSIS — K59.00 CONSTIPATION, UNSPECIFIED CONSTIPATION TYPE: ICD-10-CM

## 2023-08-08 DIAGNOSIS — R19.5 CHANGE IN CONSISTENCY OF STOOL: ICD-10-CM

## 2023-08-08 PROCEDURE — 99999 PR PBB SHADOW E&M-EST. PATIENT-LVL III: ICD-10-PCS | Mod: PBBFAC,,, | Performed by: INTERNAL MEDICINE

## 2023-08-08 PROCEDURE — 86364 TISS TRNSGLTMNASE EA IG CLAS: CPT | Performed by: INTERNAL MEDICINE

## 2023-08-08 PROCEDURE — 3074F SYST BP LT 130 MM HG: CPT | Mod: CPTII,S$GLB,, | Performed by: INTERNAL MEDICINE

## 2023-08-08 PROCEDURE — 99204 PR OFFICE/OUTPT VISIT, NEW, LEVL IV, 45-59 MIN: ICD-10-PCS | Mod: S$GLB,,, | Performed by: INTERNAL MEDICINE

## 2023-08-08 PROCEDURE — 3078F PR MOST RECENT DIASTOLIC BLOOD PRESSURE < 80 MM HG: ICD-10-PCS | Mod: CPTII,S$GLB,, | Performed by: INTERNAL MEDICINE

## 2023-08-08 PROCEDURE — 3078F DIAST BP <80 MM HG: CPT | Mod: CPTII,S$GLB,, | Performed by: INTERNAL MEDICINE

## 2023-08-08 PROCEDURE — 3008F PR BODY MASS INDEX (BMI) DOCUMENTED: ICD-10-PCS | Mod: CPTII,S$GLB,, | Performed by: INTERNAL MEDICINE

## 2023-08-08 PROCEDURE — 3044F PR MOST RECENT HEMOGLOBIN A1C LEVEL <7.0%: ICD-10-PCS | Mod: CPTII,S$GLB,, | Performed by: INTERNAL MEDICINE

## 2023-08-08 PROCEDURE — 99999 PR PBB SHADOW E&M-EST. PATIENT-LVL III: CPT | Mod: PBBFAC,,, | Performed by: INTERNAL MEDICINE

## 2023-08-08 PROCEDURE — 1159F PR MEDICATION LIST DOCUMENTED IN MEDICAL RECORD: ICD-10-PCS | Mod: CPTII,S$GLB,, | Performed by: INTERNAL MEDICINE

## 2023-08-08 PROCEDURE — 99204 OFFICE O/P NEW MOD 45 MIN: CPT | Mod: S$GLB,,, | Performed by: INTERNAL MEDICINE

## 2023-08-08 PROCEDURE — 3044F HG A1C LEVEL LT 7.0%: CPT | Mod: CPTII,S$GLB,, | Performed by: INTERNAL MEDICINE

## 2023-08-08 PROCEDURE — 1159F MED LIST DOCD IN RCRD: CPT | Mod: CPTII,S$GLB,, | Performed by: INTERNAL MEDICINE

## 2023-08-08 PROCEDURE — 83993 ASSAY FOR CALPROTECTIN FECAL: CPT | Performed by: INTERNAL MEDICINE

## 2023-08-08 PROCEDURE — 3074F PR MOST RECENT SYSTOLIC BLOOD PRESSURE < 130 MM HG: ICD-10-PCS | Mod: CPTII,S$GLB,, | Performed by: INTERNAL MEDICINE

## 2023-08-08 PROCEDURE — 3008F BODY MASS INDEX DOCD: CPT | Mod: CPTII,S$GLB,, | Performed by: INTERNAL MEDICINE

## 2023-08-08 PROCEDURE — 36415 COLL VENOUS BLD VENIPUNCTURE: CPT | Performed by: INTERNAL MEDICINE

## 2023-08-08 RX ORDER — POLYETHYLENE GLYCOL 3350 17 G/17G
17 POWDER, FOR SOLUTION ORAL DAILY PRN
COMMUNITY
End: 2023-11-13

## 2023-08-08 NOTE — PROGRESS NOTES
[facility] Consultation     Reason for Consult:  The encounter diagnosis was Irregular bowel habits.    PCP:   Jelani Nolan   No address on file    Referring MD:  Self, Aaareferral  No address on file    HPI:  This is a 28 y.o. female who presents to GI clinic. She is referred from her PCP.  She mentioned irregular bowel habits and abdominal discomfort at her PCP visit. CT scan has been ordered. CBC / CMP done yesterday unremarkable.    She reports at baseline she has had loose stools but in the past 4-5 months she developed constipation requiring miralax and also left sided dull abdominal pain.  The left sided pain is improved with defecation. Happens several times per week and lasts all day.  She denies associated fevers, nausea, vomiting or blood in the stool. Does say she has a hemorrhoid and if she passes a hard stool, has seen [facility].  She has a history of iron deficiency anemia. Says she has heavy menses but unsure if this is the reason. Is not on iron supplementation.  Denies a family history of colorectal or GI malignancy.  She is intentionally losing weight by eating healthier and thought this would help her constipation.     She is a former smoker and marijuana use  Drinks alcohol occasionally    Takes excedrin a few times per week for migraines. Denies NSAID use.         Medical History:  has a past medical history of Anemia, Influenza (01/2019), and Pyelonephritis (02/18/2019).    Surgical History:  has a past surgical history that includes No past surgeries (02/18/2019).    Family History: family history includes Arthritis in her father and maternal grandfather; Diabetes in her maternal aunt; No Known Problems in her brother, mother, and sister; Thyroid disease in her father, maternal grandmother, and paternal grandmother..   No contributory family history     Social History:  reports that she quit smoking about 4 years ago. Her smoking use included cigarettes. She started  "smoking about 6 years ago. She has a 0.2 pack-year smoking history. She has never used smokeless tobacco. She reports that she does not currently use alcohol. She reports that she does not use drugs.    Review of patient's allergies indicates:   Allergen Reactions    Ciprofloxacin Hives    Sulfamethoxazole-trimethoprim Hives    Doxycycline      Swelling and rash to face    Prednisone (bulk) Hives, Itching, Palpitations and Rash       Current Outpatient Rx   Medication Sig Dispense Refill    ibuprofen (ADVIL,MOTRIN) 600 MG tablet Take 1 tablet (600 mg total) by mouth every 6 (six) hours as needed for Pain. 30 tablet 1    polyethylene glycol (GLYCOLAX) 17 gram PwPk Take 17 g by mouth daily as needed (Maybe twice a week).      tiZANidine (ZANAFLEX) 4 MG tablet Take 1 tablet (4 mg total) by mouth every 8 (eight) hours as needed (back pain). 30 tablet 0       Objective Findings:    Vital Signs:  /70   Pulse 82   Ht 5' 6" (1.676 m)   Wt (!) 138 kg (304 lb 5.5 oz)   BMI 49.12 kg/m²   Body mass index is 49.12 kg/m².    Physical Exam:  General Appearance: well-appearing, NAD  Head:   Normocephalic, without obvious abnormality  Eyes:    No scleral icterus, EOMI  ENT: Neck supple; moist mucus membranes  Lungs: clear to auscultation bilaterally.  Heart:  regular rate and rhythm, S1, S2 normal, no murmurs heard  Abdomen: soft, non-tender, non-distended with bowel sounds in all four quadrants. No hepatosplenomegaly, ascites, or palpable masses  Extremities: 2+ pulses, no clubbing, cyanosis or edema  Skin: No visible rash  Neurologic: no focal motor deficits      Labs:  Lab Results   Component Value Date    WBC 6.86 08/07/2023    HGB 12.5 08/07/2023    HCT 39.0 08/07/2023     08/07/2023    CHOL 194 04/28/2023    TRIG 103 04/28/2023    HDL 43 04/28/2023    ALT 20 08/07/2023    AST 15 08/07/2023     08/07/2023    K 4.5 08/07/2023     08/07/2023    CREATININE 0.9 08/07/2023    BUN 12 08/07/2023    CO2 25 " 08/07/2023    TSH 2.184 04/28/2023    HGBA1C 5.0 04/28/2023     Assessment/Plan:  This is a 28 y.o. female who presents to GI clinic.    Irregular bowel movements  Left sided abdominal discomfort  Suspect she has a component of IBS-C but will go ahead and check fecal calprotectin and celiac panel  CBC  / CMP unremarkable, has had KRIS in the past which she attributes to heavy menses. Advised OBGYN follow up.   Counseled on hydrating with 8-12 glasses of water per day  Increase dietary fiber  Start fiber supplementation, goal 20-30 grams per day  Take miralax as needed  Take IBgard as needed    Will follow up in 3 months. If symptoms worsening or not improved, can discuss colonoscopy.     Order summary:  Orders Placed This Encounter    Celiac Disease Panel    Calprotectin, Stool         Thank you so much for allowing me to participate in the care of Frankie Bonilla MD  Gastroenterology   [facility]

## 2023-08-11 ENCOUNTER — HOSPITAL ENCOUNTER (OUTPATIENT)
Dept: RADIOLOGY | Facility: HOSPITAL | Age: 28
Discharge: HOME | End: 2023-08-11
Attending: NURSE PRACTITIONER
Payer: COMMERCIAL

## 2023-08-11 ENCOUNTER — PATIENT MESSAGE (OUTPATIENT)
Dept: GASTROENTEROLOGY | Facility: CLINIC | Age: 28
End: 2023-08-11
Payer: COMMERCIAL

## 2023-08-11 DIAGNOSIS — R10.12 LEFT UPPER QUADRANT ABDOMINAL PAIN: ICD-10-CM

## 2023-08-11 LAB
GLIADIN PEPTIDE IGA SER-ACNC: 1.1 U/ML
GLIADIN PEPTIDE IGG SER-ACNC: 1 U/ML
IGA SERPL-MCNC: 141 MG/DL (ref 70–400)
TTG IGA SER-ACNC: 0.4 U/ML
TTG IGG SER-ACNC: <0.6 U/ML

## 2023-08-11 PROCEDURE — 74177 CT ABD & PELVIS W/CONTRAST: CPT | Mod: 26,,, | Performed by: STUDENT IN AN ORGANIZED HEALTH CARE EDUCATION/TRAINING PROGRAM

## 2023-08-11 PROCEDURE — 25500020 PHARM REV CODE 255: Performed by: NURSE PRACTITIONER

## 2023-08-11 PROCEDURE — 74177 CT ABDOMEN PELVIS WITH CONTRAST: ICD-10-PCS | Mod: 26,,, | Performed by: STUDENT IN AN ORGANIZED HEALTH CARE EDUCATION/TRAINING PROGRAM

## 2023-08-11 PROCEDURE — 74177 CT ABD & PELVIS W/CONTRAST: CPT | Mod: TC

## 2023-08-11 RX ADMIN — IOHEXOL 100 ML: 350 INJECTION, SOLUTION INTRAVENOUS at 08:08

## 2023-08-15 LAB — CALPROTECTIN STL-MCNT: <27.1 MCG/G

## 2023-08-22 ENCOUNTER — TELEPHONE (OUTPATIENT)
Dept: FAMILY MEDICINE | Facility: CLINIC | Age: 28
End: 2023-08-22
Payer: COMMERCIAL

## 2023-08-25 ENCOUNTER — TELEPHONE (OUTPATIENT)
Dept: FAMILY MEDICINE | Facility: CLINIC | Age: 28
End: 2023-08-25
Payer: COMMERCIAL

## 2023-10-13 ENCOUNTER — HOSPITAL ENCOUNTER (EMERGENCY)
Facility: HOSPITAL | Age: 28
Discharge: HOME | End: 2023-10-13
Attending: EMERGENCY MEDICINE
Payer: COMMERCIAL

## 2023-10-13 VITALS
DIASTOLIC BLOOD PRESSURE: 70 MMHG | RESPIRATION RATE: 18 BRPM | BODY MASS INDEX: 48.1 KG/M2 | TEMPERATURE: 98 F | WEIGHT: 293 LBS | SYSTOLIC BLOOD PRESSURE: 135 MMHG | OXYGEN SATURATION: 100 % | HEART RATE: 60 BPM

## 2023-10-13 DIAGNOSIS — N30.00 ACUTE CYSTITIS WITHOUT HEMATURIA: ICD-10-CM

## 2023-10-13 DIAGNOSIS — R19.7 NAUSEA VOMITING AND DIARRHEA: Primary | ICD-10-CM

## 2023-10-13 DIAGNOSIS — R11.2 NAUSEA VOMITING AND DIARRHEA: Primary | ICD-10-CM

## 2023-10-13 PROBLEM — J30.2 SEASONAL ALLERGIC RHINITIS: Status: ACTIVE | Noted: 2021-12-13

## 2023-10-13 LAB
ALBUMIN SERPL-MCNC: 4.1 G/DL (ref 3.3–5.5)
ALBUMIN SERPL-MCNC: 4.2 G/DL (ref 3.3–5.5)
ALP SERPL-CCNC: 74 U/L (ref 42–141)
ALP SERPL-CCNC: 79 U/L (ref 42–141)
B-HCG UR QL: NEGATIVE
BILIRUB SERPL-MCNC: 0.7 MG/DL (ref 0.2–1.6)
BILIRUB SERPL-MCNC: 0.8 MG/DL (ref 0.2–1.6)
BILIRUBIN, POC UA: NEGATIVE
BLOOD, POC UA: NEGATIVE
BUN SERPL-MCNC: 9 MG/DL (ref 7–22)
CALCIUM SERPL-MCNC: 9.8 MG/DL (ref 8–10.3)
CHLORIDE SERPL-SCNC: 106 MMOL/L (ref 98–108)
CLARITY, POC UA: CLEAR
COLOR, POC UA: YELLOW
CREAT SERPL-MCNC: 0.9 MG/DL (ref 0.6–1.2)
CTP QC/QA: YES
GLUCOSE SERPL-MCNC: 95 MG/DL (ref 73–118)
GLUCOSE, POC UA: NEGATIVE
KETONES, POC UA: NEGATIVE
LEUKOCYTE EST, POC UA: ABNORMAL
NITRITE, POC UA: NEGATIVE
PH UR STRIP: 7 [PH]
POC ALT (SGPT): 18 U/L (ref 10–47)
POC ALT (SGPT): 21 U/L (ref 10–47)
POC AMYLASE: 35 U/L (ref 14–97)
POC AST (SGOT): 14 U/L (ref 11–38)
POC AST (SGOT): 18 U/L (ref 11–38)
POC GGT: 13 U/L (ref 5–65)
POC TCO2: 27 MMOL/L (ref 18–33)
POTASSIUM BLD-SCNC: 3.8 MMOL/L (ref 3.6–5.1)
PROTEIN, POC UA: ABNORMAL
PROTEIN, POC: 7.2 G/DL (ref 6.4–8.1)
PROTEIN, POC: 7.4 G/DL (ref 6.4–8.1)
SODIUM BLD-SCNC: 143 MMOL/L (ref 128–145)
SPECIFIC GRAVITY, POC UA: 1.02
UROBILINOGEN, POC UA: 0.2 E.U./DL

## 2023-10-13 PROCEDURE — 87086 URINE CULTURE/COLONY COUNT: CPT | Performed by: NURSE PRACTITIONER

## 2023-10-13 PROCEDURE — 96375 TX/PRO/DX INJ NEW DRUG ADDON: CPT | Mod: ER

## 2023-10-13 PROCEDURE — 81025 URINE PREGNANCY TEST: CPT | Mod: ER

## 2023-10-13 PROCEDURE — 63600175 PHARM REV CODE 636 W HCPCS: Mod: ER | Performed by: NURSE PRACTITIONER

## 2023-10-13 PROCEDURE — 81025 URINE PREGNANCY TEST: CPT | Mod: ER | Performed by: NURSE PRACTITIONER

## 2023-10-13 PROCEDURE — 96361 HYDRATE IV INFUSION ADD-ON: CPT | Mod: ER

## 2023-10-13 PROCEDURE — 99284 EMERGENCY DEPT VISIT MOD MDM: CPT | Mod: 25,ER

## 2023-10-13 PROCEDURE — 96374 THER/PROPH/DIAG INJ IV PUSH: CPT | Mod: ER

## 2023-10-13 PROCEDURE — 82040 ASSAY OF SERUM ALBUMIN: CPT | Mod: 59,ER

## 2023-10-13 PROCEDURE — 25000003 PHARM REV CODE 250: Mod: ER | Performed by: NURSE PRACTITIONER

## 2023-10-13 PROCEDURE — 80053 COMPREHEN METABOLIC PANEL: CPT | Mod: ER

## 2023-10-13 PROCEDURE — 82150 ASSAY OF AMYLASE: CPT | Mod: ER

## 2023-10-13 RX ORDER — NITROFURANTOIN 25; 75 MG/1; MG/1
100 CAPSULE ORAL 2 TIMES DAILY
Qty: 10 CAPSULE | Refills: 0 | Status: SHIPPED | OUTPATIENT
Start: 2023-10-13 | End: 2023-10-18

## 2023-10-13 RX ORDER — DICYCLOMINE HYDROCHLORIDE 20 MG/1
20 TABLET ORAL 2 TIMES DAILY PRN
Qty: 20 TABLET | Refills: 0 | Status: SHIPPED | OUTPATIENT
Start: 2023-10-13 | End: 2023-11-12

## 2023-10-13 RX ORDER — PROMETHAZINE HYDROCHLORIDE 25 MG/1
25 TABLET ORAL EVERY 6 HOURS PRN
Qty: 15 TABLET | Refills: 0 | Status: SHIPPED | OUTPATIENT
Start: 2023-10-13 | End: 2023-11-13

## 2023-10-13 RX ORDER — NITROFURANTOIN 25; 75 MG/1; MG/1
100 CAPSULE ORAL
Status: COMPLETED | OUTPATIENT
Start: 2023-10-13 | End: 2023-10-13

## 2023-10-13 RX ORDER — ONDANSETRON 2 MG/ML
4 INJECTION INTRAMUSCULAR; INTRAVENOUS
Status: COMPLETED | OUTPATIENT
Start: 2023-10-13 | End: 2023-10-13

## 2023-10-13 RX ORDER — KETOROLAC TROMETHAMINE 30 MG/ML
15 INJECTION, SOLUTION INTRAMUSCULAR; INTRAVENOUS
Status: COMPLETED | OUTPATIENT
Start: 2023-10-13 | End: 2023-10-13

## 2023-10-13 RX ORDER — FAMOTIDINE 20 MG/1
20 TABLET, FILM COATED ORAL 2 TIMES DAILY PRN
Qty: 20 TABLET | Refills: 0 | Status: SHIPPED | OUTPATIENT
Start: 2023-10-13 | End: 2023-11-13

## 2023-10-13 RX ADMIN — SODIUM CHLORIDE 1000 ML: 9 INJECTION, SOLUTION INTRAVENOUS at 10:10

## 2023-10-13 RX ADMIN — NITROFURANTOIN MONOHYDRATE/MACROCRYSTALS 100 MG: 25; 75 CAPSULE ORAL at 11:10

## 2023-10-13 RX ADMIN — KETOROLAC TROMETHAMINE 15 MG: 30 INJECTION, SOLUTION INTRAMUSCULAR; INTRAVENOUS at 10:10

## 2023-10-13 RX ADMIN — ONDANSETRON 4 MG: 2 INJECTION INTRAMUSCULAR; INTRAVENOUS at 10:10

## 2023-10-13 NOTE — ED PROVIDER NOTES
Encounter Date: 10/13/2023    SCRIBE #1 NOTE: I, Mindy Huggins, am scribing for, and in the presence of,  Nelly Malloy NP. I have scribed the following portions of the note - Other sections scribed: HPI, MELQUIADES.       History     Chief Complaint   Patient presents with    Abdominal Pain    Diarrhea    Vomiting     Pt presents to the ED with complaint of abd pain N/V/D onset approx 2 days ago. Pt reports taking zofran for her symptoms with no relief     Frankie Garcia is a 28 y.o. female, with a PMHx of Pyelonephritis, who presents to the ED with acute, intractable diarrhea that began approximately 36 hours ago. Patient reports associated abdominal pain (general, tightness, intermittently present after attempting to eat), vomiting, nausea, chills and subjective dehydration due to not being able to eat/drink anything. Reports pain woke her from sleep. She states she has been seeing a GI doctor for stomach issues before, and she is planning on returning in the next few months. Attempted Tx with fiber supplements, Zofran and leftover Bentyl from a past prescription with no relief. Last dose of Zofran was this AM PTA. Patient does not recall eating anything out of the ordinary, or undercooked meat/fish. Denies PSHx of appendectomy or cholecystectomy, or other abdominal surgeries. Denies sick contact. Patient states she has been eating healthier and has lost 23 lbs in the last few months. Patient states she is worried because Hx of sepsis following pyelonephritis in 2019. Denies recent travel outside of the country. Denies recent antibiotic use. No other exacerbating or alleviating factors. Denies fever, dysuria, hematuria, decreased urine or other associated symptoms. Patient drove herself to ED today but her grandmother is coming to pick her up.       The history is provided by the patient. No  was used.     Review of patient's allergies indicates:   Allergen Reactions    Ciprofloxacin Hives     Sulfamethoxazole-trimethoprim Hives    Doxycycline      Swelling and rash to face    Prednisone (bulk) Hives, Itching, Palpitations and Rash     Past Medical History:   Diagnosis Date    Anemia     Influenza 2019    Pyelonephritis 2019    sepsis    Seasonal allergic rhinitis 2021     Past Surgical History:   Procedure Laterality Date    NO PAST SURGERIES  2019     Family History   Problem Relation Age of Onset    No Known Problems Mother     Arthritis Father     Thyroid disease Father     No Known Problems Sister     No Known Problems Brother     Diabetes Maternal Aunt     Thyroid disease Maternal Grandmother     Arthritis Maternal Grandfather     Thyroid disease Paternal Grandmother     Breast cancer Neg Hx     Colon cancer Neg Hx     Ovarian cancer Neg Hx     Melanoma Neg Hx     Esophageal cancer Neg Hx      Social History     Tobacco Use    Smoking status: Former     Current packs/day: 0.00     Average packs/day: 0.1 packs/day for 2.0 years (0.2 ttl pk-yrs)     Types: Cigarettes     Start date: 2017     Quit date: 2019     Years since quittin.7    Smokeless tobacco: Never   Substance Use Topics    Alcohol use: Not Currently     Comment: socially, monthly  - 2 drinks per month    Drug use: No     Review of Systems   Constitutional:  Positive for chills and diaphoresis. Negative for fever.        (+) dehydration, subjective   HENT:  Negative for congestion and sore throat.    Eyes:  Negative for visual disturbance.   Respiratory:  Negative for cough and shortness of breath.    Cardiovascular:  Negative for chest pain.   Gastrointestinal:  Positive for abdominal pain, diarrhea, nausea and vomiting. Negative for blood in stool.   Genitourinary:  Negative for decreased urine volume, dysuria, frequency, hematuria and vaginal discharge.   Skin:  Negative for rash.   Neurological:  Negative for headaches.   Psychiatric/Behavioral:  Negative for decreased concentration.        Physical  Exam     Initial Vitals   BP Pulse Resp Temp SpO2   10/13/23 0852 10/13/23 0852 10/13/23 0852 10/13/23 0853 10/13/23 0852   124/84 78 18 98.1 °F (36.7 °C) 99 %      MAP       --                Physical Exam    Constitutional: She appears well-developed and well-nourished. She is not diaphoretic. No distress.   Body mass index is 48.1 kg/m².     HENT:   Head: Normocephalic and atraumatic.   Neck:   Normal range of motion.  Cardiovascular:  Normal rate, regular rhythm, normal heart sounds and intact distal pulses.           Pulmonary/Chest: Breath sounds normal. No respiratory distress.   Abdominal: Abdomen is soft. Bowel sounds are normal. There is no abdominal tenderness.   Musculoskeletal:         General: Normal range of motion.      Cervical back: Normal range of motion.     Neurological: She is alert and oriented to person, place, and time.   Skin: Skin is warm and dry.   Psychiatric: She has a normal mood and affect. Her behavior is normal.         ED Course   Procedures  Labs Reviewed   POCT URINALYSIS W/O SCOPE - Abnormal; Notable for the following components:       Result Value    Protein, UA Trace (*)     Leukocytes, UA 1+ (*)     All other components within normal limits   CULTURE, URINE   POCT URINE PREGNANCY   POCT URINALYSIS W/O SCOPE   POCT CBC   POCT CMP   POCT LIVER PANEL   POCT LIVER PANEL   POCT CMP            Imaging Results    None          Medications   sodium chloride 0.9% bolus 1,000 mL 1,000 mL (0 mLs Intravenous Stopped 10/13/23 1127)   ondansetron injection 4 mg (4 mg Intravenous Given 10/13/23 1006)   ketorolac injection 15 mg (15 mg Intravenous Given 10/13/23 1006)   nitrofurantoin (macrocrystal-monohydrate) 100 MG capsule 100 mg (100 mg Oral Given 10/13/23 1142)     Medical Decision Making  28-year-old female presenting to the ED with generalized abdominal pain with associated nausea, vomiting, diarrhea.  Differentials include gastroenteritis, cholecystitis, pancreatitis, diverticulitis,  bowel obstruction, others.  On exam, patient is pleasant well-appearing.  Vital signs stable and reassuring.  Afebrile.  She is moist mucous membranes.  Abdominal exam is benign without tenderness, guarding, rigidity.  No CVA tenderness.  UPT negative.  Urinalysis with 1+ leukocytes.  Will culture urine and treat with antibiotics.  No leukocytosis on CBC concerning for systemic infection.  No significant electrolyte abnormality or evidence of organ dysfunction on CMP or liver panel.  Patient reported improvement in symptoms following fluids and medications.  Unable to provide a stool sample for stool studies.  Will discharge home.  Discussed return precautions with the patient who verbalized understanding.  She is agreeable to plan of care.    Amount and/or Complexity of Data Reviewed  Labs: ordered.    Risk  Prescription drug management.            Scribe Attestation:   Scribe #1: I performed the above scribed service and the documentation accurately describes the services I performed. I attest to the accuracy of the note.                      I, KWASI Malloy, personally performed the services described in this documentation.  All medical record entries made by the scribe were at my direction and in my presence.  I have reviewed the chart and agree that the record reflects my personal performance and is accurate and complete.    Clinical Impression:   Final diagnoses:  [R11.2, R19.7] Nausea vomiting and diarrhea (Primary)  [N30.00] Acute cystitis without hematuria        ED Disposition Condition    Discharge Stable          ED Prescriptions       Medication Sig Dispense Start Date End Date Auth. Provider    nitrofurantoin, macrocrystal-monohydrate, (MACROBID) 100 MG capsule Take 1 capsule (100 mg total) by mouth 2 (two) times daily. for 5 days 10 capsule 10/13/2023 10/18/2023 Nelly Malloy NP    promethazine (PHENERGAN) 25 MG tablet Take 1 tablet (25 mg total) by mouth every 6 (six) hours as needed for Nausea.  15 tablet 10/13/2023 -- Nelly Malloy NP    dicyclomine (BENTYL) 20 mg tablet Take 1 tablet (20 mg total) by mouth 2 (two) times daily as needed (Stomach cramping). 20 tablet 10/13/2023 11/12/2023 Nelly Malloy NP    famotidine (PEPCID) 20 MG tablet Take 1 tablet (20 mg total) by mouth 2 (two) times daily as needed for Heartburn. 20 tablet 10/13/2023 10/12/2024 Nelly Malloy NP          Follow-up Information       Follow up With Specialties Details Why Contact Info    Jelani Nolan MD Internal Medicine Schedule an appointment as soon as possible for a visit  For follow-up [address] [facility] [address]  [phone]      Juan Bonilla MD Gastroenterology Schedule an appointment as soon as possible for a visit  For follow-up [address] [facility] [address]  [address]  [facility] [address]  [phone]      [facility] ED Emergency Medicine Go to  If symptoms worsen [address] [facility] [address] [phone]             Nelly Malloy NP  10/13/23 8273

## 2023-10-13 NOTE — DISCHARGE INSTRUCTIONS
Thank you for coming to our Emergency Department today. It is important to remember that some problems or medical conditions are difficult to diagnose and may not be found during your Emergency Department visit.     Be sure to follow up with your primary care doctor and review all labs/imaging/tests that were performed during your ER visit with them. Some labs/tests may be outside of the normal range and require non-emergent follow-up and further investigation to help diagnose/exclude/prevent complications or other potentially serious medical conditions that were not addressed during your ER visit.    If you do not have a primary care doctor, you may contact the one listed on your discharge paperwork or you may also call the [facility] Appointment Desk at [phone] to schedule an appointment and establish care with one. It is important to your health that you have a primary care doctor.    Please take all medications as directed. All medications may potentially have side-effects and it is impossible to predict which medications may give you side-effects or what side-effects (if any) they will give you.. If you feel that you are having a negative effect or side-effect of any medication you should immediately stop taking them and seek medical attention. If you feel that you are having a life-threatening reaction call 911.    Return to the ER with any questions/concerns, new/concerning symptoms, worsening or failure to improve.     Do not drive, swim, climb to height, take a bath, operate heavy machinery, drink alcohol or take potentially sedating medications, sign any legal documents or make any important decisions for 24 hours if you have received any pain medications, sedatives or mood altering drugs during your ER visit or within 24 hours of taking them if they have been prescribed to you.     You can find additional resources for Dentists, hearing aids, durable medical equipment, low cost pharmacies and  other resources at [email or web address].org    BELOW THIS LINE ONLY APPLIES IF YOU HAVE A COVID TEST PENDING OR IF YOU HAVE BEEN DIAGNOSED WITH COVID:  Please access [facility] to review the results of your test. Until the results of your COVID test return, you should isolate yourself so as not to potentially spread illness to others.   If your COVID test returns positive, you should isolate yourself so as not to spread illness to others. After five full days, if you are feeling better and you have not had fever for 24 hours, you can return to your typical daily activities, but you must wear a mask around others for an additional 5 days.   If your COVID test returns negative and you are either unvaccinated or more than six months out from your two-dose vaccine and are not yet boosted, you should still quarantine for 5 full days followed by strict mask use for an additional 5 full days.   If your COVID test returns negative and you have received your 2-dose initial vaccine as well as a booster, you should continue strict mask use for 10 full days after the exposure.  For all those exposed, best practice includes a test at day 5 after the exposure. This can be a home test or a test through one of the many testing centers throughout our community.   Masking is always advised to limit the spread of COVID. Cdc.gov is an excellent site to obtain the latest up to date recommendations regarding COVID and COVID testing.     CDC Testing and Quarantine Guidelines for patients with exposure to a known-positive COVID-19 person:  A close exposure is defined as anyone who has had an exposure (masked or unmasked) to a known COVID -19 positive person within 6 feet of someone for a cumulative total of 15 minutes or more over a 24-hour period.   Vaccinated and/or if you recently had a positive covid test within 90 days do NOT need to quarantine after contact with someone who had COVID-19 unless you develop symptoms.   Fully vaccinated  people who have not had a positive test within 90 days, should get tested 3-5 days after their exposure, even if they don't have symptoms and wear a mask indoors in public for 14 days following exposure or until their test result is negative.      Unvaccinated and/or NOT had a positive test within 90 days and meet close exposure  You are required by CDC guidelines to quarantine for at least 5 days from time of exposure followed by 5 days of strict masking. It is recommended, but not required to test after 5 days, unless you develop symptoms, in which case you should test at that time.  If you get tested after 5 days and your test is positive, your 5 day period of isolation starts the day of the positive test.    If your exposure does not meet the above definition, you can return to your normal daily activities to include social distancing, wearing a mask and frequent handwashing.      Here is a link to guidance from the CDC:  https://www.cdc.gov/media/releases/2021/s1227-isolation-quarantine-guidance.html      [place] Dept Of Health Testing Sites:  https://ldh.la.gov/page/3934      [facility] website with testing locations and guidance:  [email or web address]

## 2023-10-15 LAB — BACTERIA UR CULT: NORMAL

## 2023-11-13 ENCOUNTER — OFFICE VISIT (OUTPATIENT)
Dept: INTERNAL MEDICINE | Facility: CLINIC | Age: 28
End: 2023-11-13
Payer: COMMERCIAL

## 2023-11-13 VITALS
BODY MASS INDEX: 47.05 KG/M2 | DIASTOLIC BLOOD PRESSURE: 68 MMHG | OXYGEN SATURATION: 99 % | SYSTOLIC BLOOD PRESSURE: 112 MMHG | HEART RATE: 59 BPM | WEIGHT: 292.75 LBS | HEIGHT: 66 IN

## 2023-11-13 DIAGNOSIS — H92.03 OTALGIA OF BOTH EARS: Primary | ICD-10-CM

## 2023-11-13 PROCEDURE — 3044F PR MOST RECENT HEMOGLOBIN A1C LEVEL <7.0%: ICD-10-PCS | Mod: CPTII,S$GLB,, | Performed by: PHYSICIAN ASSISTANT

## 2023-11-13 PROCEDURE — 1159F MED LIST DOCD IN RCRD: CPT | Mod: CPTII,S$GLB,, | Performed by: PHYSICIAN ASSISTANT

## 2023-11-13 PROCEDURE — 3008F BODY MASS INDEX DOCD: CPT | Mod: CPTII,S$GLB,, | Performed by: PHYSICIAN ASSISTANT

## 2023-11-13 PROCEDURE — 3078F DIAST BP <80 MM HG: CPT | Mod: CPTII,S$GLB,, | Performed by: PHYSICIAN ASSISTANT

## 2023-11-13 PROCEDURE — 3044F HG A1C LEVEL LT 7.0%: CPT | Mod: CPTII,S$GLB,, | Performed by: PHYSICIAN ASSISTANT

## 2023-11-13 PROCEDURE — 99214 PR OFFICE/OUTPT VISIT, EST, LEVL IV, 30-39 MIN: ICD-10-PCS | Mod: S$GLB,,, | Performed by: PHYSICIAN ASSISTANT

## 2023-11-13 PROCEDURE — 3078F PR MOST RECENT DIASTOLIC BLOOD PRESSURE < 80 MM HG: ICD-10-PCS | Mod: CPTII,S$GLB,, | Performed by: PHYSICIAN ASSISTANT

## 2023-11-13 PROCEDURE — 1159F PR MEDICATION LIST DOCUMENTED IN MEDICAL RECORD: ICD-10-PCS | Mod: CPTII,S$GLB,, | Performed by: PHYSICIAN ASSISTANT

## 2023-11-13 PROCEDURE — 3074F SYST BP LT 130 MM HG: CPT | Mod: CPTII,S$GLB,, | Performed by: PHYSICIAN ASSISTANT

## 2023-11-13 PROCEDURE — 3008F PR BODY MASS INDEX (BMI) DOCUMENTED: ICD-10-PCS | Mod: CPTII,S$GLB,, | Performed by: PHYSICIAN ASSISTANT

## 2023-11-13 PROCEDURE — 3074F PR MOST RECENT SYSTOLIC BLOOD PRESSURE < 130 MM HG: ICD-10-PCS | Mod: CPTII,S$GLB,, | Performed by: PHYSICIAN ASSISTANT

## 2023-11-13 PROCEDURE — 99999 PR PBB SHADOW E&M-EST. PATIENT-LVL III: ICD-10-PCS | Mod: PBBFAC,,, | Performed by: PHYSICIAN ASSISTANT

## 2023-11-13 PROCEDURE — 99999 PR PBB SHADOW E&M-EST. PATIENT-LVL III: CPT | Mod: PBBFAC,,, | Performed by: PHYSICIAN ASSISTANT

## 2023-11-13 PROCEDURE — 99214 OFFICE O/P EST MOD 30 MIN: CPT | Mod: S$GLB,,, | Performed by: PHYSICIAN ASSISTANT

## 2023-11-13 RX ORDER — AZELASTINE 1 MG/ML
1 SPRAY, METERED NASAL NIGHTLY
Qty: 30 ML | Refills: 0 | Status: SHIPPED | OUTPATIENT
Start: 2023-11-13 | End: 2024-11-12

## 2023-11-13 RX ORDER — CETIRIZINE HYDROCHLORIDE 10 MG/1
10 TABLET ORAL DAILY
Qty: 30 TABLET | Refills: 0 | Status: SHIPPED | OUTPATIENT
Start: 2023-11-13 | End: 2024-11-12

## 2023-11-13 NOTE — PROGRESS NOTES
"INTERNAL MEDICINE URGENT VISIT NOTE    CHIEF COMPLAINT     Chief Complaint   Patient presents with    Otalgia     Both mainly the left ear       HPI     Frankie Garcia is a 28 y.o. female who presents for an urgent visit today.    PCP is Jelani Nolan MD, patient is new to me.     Patient presnets with complaints of ear pressure bilatearlly, left greater than right.   No fever or chills. Symptoms have been present for 4 weeks.   No nasal congestion, cough or cold symptoms.   No ear discharge or pain.   Mild hearing changes, "feels like ear needs to pop"       Past Medical History:  Past Medical History:   Diagnosis Date    Anemia     Influenza 01/2019    Pyelonephritis 02/18/2019    sepsis    Seasonal allergic rhinitis 12/13/2021       Home Medications:  Prior to Admission medications    Medication Sig Start Date End Date Taking? Authorizing Provider   ibuprofen (ADVIL,MOTRIN) 600 MG tablet Take 1 tablet (600 mg total) by mouth every 6 (six) hours as needed for Pain. 3/27/20  Yes Jessica Carey MD   dicyclomine (BENTYL) 20 mg tablet Take 1 tablet (20 mg total) by mouth 2 (two) times daily as needed (Stomach cramping). 10/13/23 11/12/23  Nelly Malloy NP   famotidine (PEPCID) 20 MG tablet Take 1 tablet (20 mg total) by mouth 2 (two) times daily as needed for Heartburn. 10/13/23 10/12/24  Nelly Malloy NP   polyethylene glycol (GLYCOLAX) 17 gram PwPk Take 17 g by mouth daily as needed (Maybe twice a week).    Provider, Historical   promethazine (PHENERGAN) 25 MG tablet Take 1 tablet (25 mg total) by mouth every 6 (six) hours as needed for Nausea. 10/13/23   Nelly Malloy NP       Review of Systems:  Review of Systems   HENT:  Positive for ear pain. Negative for ear discharge, hearing loss, rhinorrhea and sore throat.    Respiratory:  Negative for cough.    Gastrointestinal:  Negative for abdominal pain, diarrhea and vomiting.   Musculoskeletal:  Negative for neck pain.   Skin:  Negative for " "rash.   Neurological:  Positive for headaches.       Health Maintainence:   Immunizations:  Health Maintenance         Date Due Completion Date    HPV Vaccines (3 - 3-dose series) 02/12/2021 10/12/2020    Influenza Vaccine (1) 09/01/2023 10/12/2020    COVID-19 Vaccine (3 - 2023-24 season) 09/01/2023 4/7/2021    Pap Smear 07/13/2026 7/13/2023    TETANUS VACCINE 01/07/2030 1/7/2020             PHYSICAL EXAM     /68 (BP Location: Left arm, Patient Position: Sitting, BP Method: Large (Manual))   Pulse (!) 59   Ht 5' 6" (1.676 m)   Wt 132.8 kg (292 lb 12.3 oz)   LMP 10/26/2023 (Approximate)   SpO2 99%   BMI 47.25 kg/m²     Physical Exam  Vitals and nursing note reviewed.   Constitutional:       Appearance: Normal appearance.      Comments: Healthy appearing female in NAD or apparent pain. She makes good eye contact, speaks in clear full sentences and ambulates with ease.        HENT:      Head: Normocephalic and atraumatic.      Ears:      Comments: Left TM clear and bulging  No erythema   No perforation   No purulence      Nose: Nose normal.      Mouth/Throat:      Pharynx: Oropharynx is clear.   Eyes:      Conjunctiva/sclera: Conjunctivae normal.   Cardiovascular:      Rate and Rhythm: Normal rate and regular rhythm.      Pulses: Normal pulses.   Musculoskeletal:         General: Normal range of motion.      Cervical back: No rigidity.   Skin:     General: Skin is warm and dry.      Capillary Refill: Capillary refill takes less than 2 seconds.      Findings: No rash.   Neurological:      General: No focal deficit present.      Mental Status: She is alert.      Gait: Gait normal.   Psychiatric:         Mood and Affect: Mood normal.         LABS     Lab Results   Component Value Date    HGBA1C 5.0 04/28/2023     CMP  Sodium   Date Value Ref Range Status   08/07/2023 141 136 - 145 mmol/L Final     Potassium   Date Value Ref Range Status   08/07/2023 4.5 3.5 - 5.1 mmol/L Final     Chloride   Date Value Ref " Range Status   08/07/2023 107 95 - 110 mmol/L Final     CO2   Date Value Ref Range Status   08/07/2023 25 23 - 29 mmol/L Final     Glucose   Date Value Ref Range Status   08/07/2023 89 70 - 110 mg/dL Final     BUN   Date Value Ref Range Status   08/07/2023 12 6 - 20 mg/dL Final     Creatinine   Date Value Ref Range Status   08/07/2023 0.9 0.5 - 1.4 mg/dL Final     Calcium   Date Value Ref Range Status   08/07/2023 10.1 8.7 - 10.5 mg/dL Final     Total Protein   Date Value Ref Range Status   08/07/2023 7.2 6.0 - 8.4 g/dL Final     Albumin   Date Value Ref Range Status   08/07/2023 4.4 3.5 - 5.2 g/dL Final     Total Bilirubin   Date Value Ref Range Status   08/07/2023 0.4 0.1 - 1.0 mg/dL Final     Comment:     For infants and newborns, interpretation of results should be based  on gestational age, weight and in agreement with clinical  observations.    Premature Infant recommended reference ranges:  Up to 24 hours.............<8.0 mg/dL  Up to 48 hours............<12.0 mg/dL  3-5 days..................<15.0 mg/dL  6-29 days.................<15.0 mg/dL       Alkaline Phosphatase   Date Value Ref Range Status   08/07/2023 92 55 - 135 U/L Final     AST   Date Value Ref Range Status   08/07/2023 15 10 - 40 U/L Final     ALT   Date Value Ref Range Status   08/07/2023 20 10 - 44 U/L Final     Anion Gap   Date Value Ref Range Status   08/07/2023 9 8 - 16 mmol/L Final     eGFR if    Date Value Ref Range Status   06/22/2021 >60 >60 mL/min/1.73 m^2 Final     eGFR if non    Date Value Ref Range Status   06/22/2021 >60 >60 mL/min/1.73 m^2 Final     Comment:     Calculation used to obtain the estimated glomerular filtration  rate (eGFR) is the CKD-EPI equation.        Lab Results   Component Value Date    WBC 6.86 08/07/2023    HGB 12.5 08/07/2023    HCT 39.0 08/07/2023    MCV 87 08/07/2023     08/07/2023     Lab Results   Component Value Date    CHOL 194 04/28/2023    CHOL 187 10/13/2020     CHOL 151 05/22/2007     Lab Results   Component Value Date    HDL 43 04/28/2023    HDL 48 10/13/2020     Lab Results   Component Value Date    LDLCALC 130.4 04/28/2023    LDLCALC 118.4 10/13/2020     Lab Results   Component Value Date    TRIG 103 04/28/2023    TRIG 103 10/13/2020     Lab Results   Component Value Date    CHOLHDL 22.2 04/28/2023    CHOLHDL 25.7 10/13/2020     Lab Results   Component Value Date    TSH 2.184 04/28/2023       ASSESSMENT/PLAN     Frankie Garcia is a 28 y.o. female     Frankie was seen today for otalgia. Seasonal allergies? HEENT inflammation? No clinical signs of infection. Will treat symptomatically. RTC if symptoms persist or worsen.     Diagnoses and all orders for this visit:    Otalgia of both ears    Other orders  -     cetirizine (ZYRTEC) 10 MG tablet; Take 1 tablet (10 mg total) by mouth once daily.  -     azelastine (ASTELIN) 137 mcg (0.1 %) nasal spray; 1 spray (137 mcg total) by Nasal route every evening.      Patient was counseled on when and how to seek emergent care.       Noy Dodd PA-C   Department of Internal Medicine - [facility]   12:53 PM     Answers submitted by the patient for this visit:  Ear Pain Questionnaire (Submitted on 11/13/2023)  Chief Complaint: Ear pain  Affected ear: both  Chronicity: new  Onset: 1 to 4 weeks ago  Progression since onset: waxing and waning  Frequency: constantly  Fever: no fever  Pain - numeric: 6/10  drainage: No  Treatments tried: NSAIDs, acetaminophen  Improvement on treatment: no relief  Pain severity: moderate  chronic ear infection: No  hearing loss: No  tympanostomy tube: No

## 2024-02-16 ENCOUNTER — TELEPHONE (OUTPATIENT)
Dept: OBSTETRICS AND GYNECOLOGY | Facility: CLINIC | Age: 29
End: 2024-02-16
Payer: COMMERCIAL

## 2024-02-16 DIAGNOSIS — Z30.9 ENCOUNTER FOR CONTRACEPTIVE MANAGEMENT, UNSPECIFIED TYPE: Primary | ICD-10-CM

## 2024-02-16 RX ORDER — DROSPIRENONE 4 MG/1
4 TABLET, FILM COATED ORAL DAILY
Qty: 84 TABLET | Refills: 3 | Status: SHIPPED | OUTPATIENT
Start: 2024-02-16 | End: 2024-05-09

## 2024-02-19 ENCOUNTER — TELEPHONE (OUTPATIENT)
Dept: OBSTETRICS AND GYNECOLOGY | Facility: CLINIC | Age: 29
End: 2024-02-19
Payer: COMMERCIAL

## 2024-02-21 ENCOUNTER — PATIENT MESSAGE (OUTPATIENT)
Dept: OBSTETRICS AND GYNECOLOGY | Facility: CLINIC | Age: 29
End: 2024-02-21
Payer: COMMERCIAL

## 2024-04-24 ENCOUNTER — CLINICAL SUPPORT (OUTPATIENT)
Dept: OBSTETRICS AND GYNECOLOGY | Facility: CLINIC | Age: 29
End: 2024-04-24
Payer: COMMERCIAL

## 2024-04-24 DIAGNOSIS — N91.2 AMENORRHEA: Primary | ICD-10-CM

## 2024-05-09 ENCOUNTER — PATIENT MESSAGE (OUTPATIENT)
Dept: OBSTETRICS AND GYNECOLOGY | Facility: CLINIC | Age: 29
End: 2024-05-09

## 2024-05-09 ENCOUNTER — OFFICE VISIT (OUTPATIENT)
Dept: OBSTETRICS AND GYNECOLOGY | Facility: CLINIC | Age: 29
End: 2024-05-09
Payer: COMMERCIAL

## 2024-05-09 ENCOUNTER — HOSPITAL ENCOUNTER (OUTPATIENT)
Dept: PERINATAL CARE | Facility: OTHER | Age: 29
Discharge: HOME | End: 2024-05-09
Attending: OBSTETRICS & GYNECOLOGY
Payer: COMMERCIAL

## 2024-05-09 VITALS
SYSTOLIC BLOOD PRESSURE: 108 MMHG | BODY MASS INDEX: 41.38 KG/M2 | WEIGHT: 257.5 LBS | HEIGHT: 66 IN | DIASTOLIC BLOOD PRESSURE: 78 MMHG

## 2024-05-09 DIAGNOSIS — Z34.80 SUPERVISION OF OTHER NORMAL PREGNANCY, ANTEPARTUM: Primary | ICD-10-CM

## 2024-05-09 DIAGNOSIS — B02.9 HERPES ZOSTER WITHOUT COMPLICATION: ICD-10-CM

## 2024-05-09 DIAGNOSIS — N91.2 AMENORRHEA: ICD-10-CM

## 2024-05-09 DIAGNOSIS — O21.9 NAUSEA/VOMITING IN PREGNANCY: ICD-10-CM

## 2024-05-09 LAB
B-HCG UR QL: POSITIVE
CTP QC/QA: YES

## 2024-05-09 PROCEDURE — 3078F DIAST BP <80 MM HG: CPT | Mod: CPTII,S$GLB,, | Performed by: NURSE PRACTITIONER

## 2024-05-09 PROCEDURE — 87086 URINE CULTURE/COLONY COUNT: CPT | Performed by: NURSE PRACTITIONER

## 2024-05-09 PROCEDURE — 76801 OB US < 14 WKS SINGLE FETUS: CPT | Mod: 26,,, | Performed by: OBSTETRICS & GYNECOLOGY

## 2024-05-09 PROCEDURE — 99999 PR PBB SHADOW E&M-EST. PATIENT-LVL III: CPT | Mod: PBBFAC,,, | Performed by: NURSE PRACTITIONER

## 2024-05-09 PROCEDURE — 81025 URINE PREGNANCY TEST: CPT | Mod: S$GLB,,, | Performed by: NURSE PRACTITIONER

## 2024-05-09 PROCEDURE — 99214 OFFICE O/P EST MOD 30 MIN: CPT | Mod: S$GLB,,, | Performed by: NURSE PRACTITIONER

## 2024-05-09 PROCEDURE — 76801 OB US < 14 WKS SINGLE FETUS: CPT

## 2024-05-09 PROCEDURE — 1159F MED LIST DOCD IN RCRD: CPT | Mod: CPTII,S$GLB,, | Performed by: NURSE PRACTITIONER

## 2024-05-09 PROCEDURE — 3074F SYST BP LT 130 MM HG: CPT | Mod: CPTII,S$GLB,, | Performed by: NURSE PRACTITIONER

## 2024-05-09 PROCEDURE — 87591 N.GONORRHOEAE DNA AMP PROB: CPT | Performed by: NURSE PRACTITIONER

## 2024-05-09 RX ORDER — VALACYCLOVIR HYDROCHLORIDE 1 G/1
1000 TABLET, FILM COATED ORAL 3 TIMES DAILY
Qty: 21 TABLET | Refills: 1 | Status: SHIPPED | OUTPATIENT
Start: 2024-05-09 | End: 2024-05-16

## 2024-05-09 RX ORDER — ONDANSETRON 4 MG/1
4 TABLET, FILM COATED ORAL DAILY PRN
Qty: 30 TABLET | Refills: 0 | Status: SHIPPED | OUTPATIENT
Start: 2024-05-09 | End: 2025-05-09

## 2024-05-09 NOTE — PROGRESS NOTES
"  CC: Positive Pregnancy Test    HISTORY OF PRESENT ILLNESS:    Frankie Garcia is a 29 y.o. female, ,  Presents today for a routine exam complaining of amenorrhea and positive home urine pregnancy test.  Patient's last menstrual period was 2024.   She is not currently on any contraception.  Reports nausea and vomiting daily. Reports breast tenderness. Denies vaginal bleeding. She has had some cramping.   Reports fatigue and some HA's. Medical h/o allergies and shingles.  She is currently having shingles outbreak to her left forearm. She quit tobacco with + UPT.   Prior  X 1 @ term.   Works in law firm.         ROS:  GENERAL: No weight changes. No swelling. No fatigue. No fever.  CARDIOVASCULAR: No chest pain. No shortness of breath. No leg cramps.   NEUROLOGICAL: No headaches. No vision changes.  BREASTS: No pain. No lumps. No discharge.  ABDOMEN: No pain. No diarrhea. No constipation.  REPRODUCTIVE: No abnormal bleeding.   VULVA: No pain. No lesions. No itching.  VAGINA: No relaxation. No itching. No odor. No discharge. No lesions.  URINARY: No incontinence. No nocturia. No frequency. No dysuria.    MEDICATIONS AND ALLERGIES:  Reviewed        COMPREHENSIVE GYN HISTORY:  PAP History: Denies abnormal Paps.  Infection History: Denies STDs. Denies PID.  Benign History: Denies uterine fibroids. Denies ovarian cysts. Denies endometriosis. Denies other conditions.  Cancer History: Denies cervical cancer. Denies uterine cancer or hyperplasia. Denies ovarian cancer. Denies vulvar cancer or pre-cancer. Denies vaginal cancer or pre-cancer. Denies breast cancer. Denies colon cancer.  Sexual Activity History: Reports currently being sexually active  Menstrual History: None.  Contraception: None    /78   Ht 5' 6" (1.676 m)   Wt 116.8 kg (257 lb 8 oz)   LMP 2024   BMI 41.56 kg/m²     PE:  AFFECT: Calm, alert and oriented X 3. Interactive during exam  GENERAL: Appears well-nourished, " well-developed, in no acute distress.  HEAD: Normocephalic, atruamatic  TEETH: Good dentition.  PELVIS: Deferred     PROCEDURES:  UPT Positive  Genprobe        ASSESSMENT/PLAN:  Amenorrhea  Positive urine pregnancy test (TIGIST: 24, EGA: 6w4d based on dating US)    -  Routine prenatal care    Nausea and vomiting in pregnancy    -  Education regarding lifestyle and dietary modifications    -  Advised use of B6/Unisom. Pt will notify us if no relief/worsening symptoms, will consider Zofran if needed.      1st TRIMESTER COUNSELING:   Common complaints of pregnancy  HIV and other routine prenatal tests including  genetic screening  Risk factors identified by prenatal history  Oriented to practice - discussed anticipated course of prenatal care & indications for Ultrasound  Childbirth classes/Hospital facilities   Nutrition and weight gain counseling  Toxoplasmosis precautions (Cats/Raw Meat)  Sexual activity and exercise  Environmental/Work hazards  Travel  Tobacco (Ask, Advise, Assess, Assist, and Arrange), as well as alcohol and drug use  Use of any medications (Including supplements, Vitamins, Herbs, or OTC Drugs)  Domestic violence  Seat belt use      TERATOLOGY COUNSELING:   Discussed indications and options for aneuploidy screening - pamphlets given    -  Pt desires [id number] and info for [employer]  given to determine cost/ coverage    Dating US done today   Zofran sent to use sparingly for severs N/V  Discussed OTC Zyrtec and Flonase for allergies   Valtrex sent for shingles out break   Can Start OTC Magnesium Sulfate as migraine prophylactic agent and can use Tylenol as needed every 4-6 hours for pain relief.  Discussed HA frequency and severity should start to level off and improve during the second trimester as estrogen levels normalize.    FOLLOW-UP in 4 weeks with Dr. Andrey Mayo NP    OB/GYN

## 2024-05-10 ENCOUNTER — PATIENT MESSAGE (OUTPATIENT)
Dept: OBSTETRICS AND GYNECOLOGY | Facility: CLINIC | Age: 29
End: 2024-05-10
Payer: COMMERCIAL

## 2024-05-10 LAB
BACTERIA UR CULT: NORMAL
BACTERIA UR CULT: NORMAL

## 2024-05-11 LAB
C TRACH DNA SPEC QL NAA+PROBE: NOT DETECTED
N GONORRHOEA DNA SPEC QL NAA+PROBE: NOT DETECTED

## 2024-05-30 ENCOUNTER — HOSPITAL ENCOUNTER (EMERGENCY)
Facility: OTHER | Age: 29
Discharge: HOME | End: 2024-05-30
Attending: EMERGENCY MEDICINE
Payer: COMMERCIAL

## 2024-05-30 ENCOUNTER — PATIENT MESSAGE (OUTPATIENT)
Dept: OBSTETRICS AND GYNECOLOGY | Facility: CLINIC | Age: 29
End: 2024-05-30
Payer: COMMERCIAL

## 2024-05-30 VITALS
TEMPERATURE: 98 F | BODY MASS INDEX: 41.14 KG/M2 | SYSTOLIC BLOOD PRESSURE: 113 MMHG | WEIGHT: 256 LBS | HEIGHT: 66 IN | OXYGEN SATURATION: 100 % | RESPIRATION RATE: 18 BRPM | DIASTOLIC BLOOD PRESSURE: 65 MMHG | HEART RATE: 72 BPM

## 2024-05-30 DIAGNOSIS — O21.0 HYPEREMESIS GRAVIDARUM: Primary | ICD-10-CM

## 2024-05-30 LAB
ALBUMIN SERPL BCP-MCNC: 3.8 G/DL (ref 3.5–5.2)
ALP SERPL-CCNC: 64 U/L (ref 55–135)
ALT SERPL W/O P-5'-P-CCNC: 11 U/L (ref 10–44)
AMORPH CRY URNS QL MICRO: ABNORMAL
ANION GAP SERPL CALC-SCNC: 6 MMOL/L (ref 8–16)
AST SERPL-CCNC: 13 U/L (ref 10–40)
BACTERIA #/AREA URNS HPF: ABNORMAL /HPF
BASOPHILS # BLD AUTO: 0.04 K/UL (ref 0–0.2)
BASOPHILS NFR BLD: 0.6 % (ref 0–1.9)
BILIRUB SERPL-MCNC: 0.6 MG/DL (ref 0.1–1)
BILIRUB UR QL STRIP: NEGATIVE
BUN SERPL-MCNC: 9 MG/DL (ref 6–20)
CALCIUM SERPL-MCNC: 9.3 MG/DL (ref 8.7–10.5)
CHLORIDE SERPL-SCNC: 109 MMOL/L (ref 95–110)
CLARITY UR: ABNORMAL
CO2 SERPL-SCNC: 19 MMOL/L (ref 23–29)
COLOR UR: YELLOW
CREAT SERPL-MCNC: 0.7 MG/DL (ref 0.5–1.4)
DIFFERENTIAL METHOD BLD: ABNORMAL
EOSINOPHIL # BLD AUTO: 0.1 K/UL (ref 0–0.5)
EOSINOPHIL NFR BLD: 1 % (ref 0–8)
ERYTHROCYTE [DISTWIDTH] IN BLOOD BY AUTOMATED COUNT: 12.3 % (ref 11.5–14.5)
EST. GFR  (NO RACE VARIABLE): >60 ML/MIN/1.73 M^2
GLUCOSE SERPL-MCNC: 84 MG/DL (ref 70–110)
GLUCOSE UR QL STRIP: NEGATIVE
HCT VFR BLD AUTO: 36.9 % (ref 37–48.5)
HGB BLD-MCNC: 12.4 G/DL (ref 12–16)
HGB UR QL STRIP: ABNORMAL
IMM GRANULOCYTES # BLD AUTO: 0.02 K/UL (ref 0–0.04)
IMM GRANULOCYTES NFR BLD AUTO: 0.3 % (ref 0–0.5)
KETONES UR QL STRIP: NEGATIVE
LEUKOCYTE ESTERASE UR QL STRIP: ABNORMAL
LIPASE SERPL-CCNC: 14 U/L (ref 4–60)
LYMPHOCYTES # BLD AUTO: 1.9 K/UL (ref 1–4.8)
LYMPHOCYTES NFR BLD: 26.9 % (ref 18–48)
MCH RBC QN AUTO: 30 PG (ref 27–31)
MCHC RBC AUTO-ENTMCNC: 33.6 G/DL (ref 32–36)
MCV RBC AUTO: 89 FL (ref 82–98)
MICROSCOPIC COMMENT: ABNORMAL
MONOCYTES # BLD AUTO: 0.5 K/UL (ref 0.3–1)
MONOCYTES NFR BLD: 6.8 % (ref 4–15)
NEUTROPHILS # BLD AUTO: 4.5 K/UL (ref 1.8–7.7)
NEUTROPHILS NFR BLD: 64.4 % (ref 38–73)
NITRITE UR QL STRIP: NEGATIVE
NRBC BLD-RTO: 0 /100 WBC
PH UR STRIP: 7 [PH] (ref 5–8)
PLATELET # BLD AUTO: 218 K/UL (ref 150–450)
PMV BLD AUTO: 9.6 FL (ref 9.2–12.9)
POTASSIUM SERPL-SCNC: 4.1 MMOL/L (ref 3.5–5.1)
PROT SERPL-MCNC: 7 G/DL (ref 6–8.4)
PROT UR QL STRIP: NEGATIVE
RBC # BLD AUTO: 4.13 M/UL (ref 4–5.4)
RBC #/AREA URNS HPF: 3 /HPF (ref 0–4)
SODIUM SERPL-SCNC: 134 MMOL/L (ref 136–145)
SP GR UR STRIP: 1.01 (ref 1–1.03)
SQUAMOUS #/AREA URNS HPF: 15 /HPF
UNIDENT CRYS URNS QL MICRO: ABNORMAL
URN SPEC COLLECT METH UR: ABNORMAL
UROBILINOGEN UR STRIP-ACNC: NEGATIVE EU/DL
WBC # BLD AUTO: 6.91 K/UL (ref 3.9–12.7)
WBC #/AREA URNS HPF: 15 /HPF (ref 0–5)

## 2024-05-30 PROCEDURE — 63600175 PHARM REV CODE 636 W HCPCS: Performed by: NURSE PRACTITIONER

## 2024-05-30 PROCEDURE — 87086 URINE CULTURE/COLONY COUNT: CPT | Performed by: NURSE PRACTITIONER

## 2024-05-30 PROCEDURE — 80053 COMPREHEN METABOLIC PANEL: CPT | Performed by: NURSE PRACTITIONER

## 2024-05-30 PROCEDURE — 83690 ASSAY OF LIPASE: CPT | Performed by: NURSE PRACTITIONER

## 2024-05-30 PROCEDURE — 96361 HYDRATE IV INFUSION ADD-ON: CPT

## 2024-05-30 PROCEDURE — 85025 COMPLETE CBC W/AUTO DIFF WBC: CPT | Performed by: NURSE PRACTITIONER

## 2024-05-30 PROCEDURE — 81000 URINALYSIS NONAUTO W/SCOPE: CPT | Performed by: NURSE PRACTITIONER

## 2024-05-30 PROCEDURE — 99284 EMERGENCY DEPT VISIT MOD MDM: CPT | Mod: 25

## 2024-05-30 PROCEDURE — 96374 THER/PROPH/DIAG INJ IV PUSH: CPT

## 2024-05-30 RX ORDER — ONDANSETRON 4 MG/1
4 TABLET, ORALLY DISINTEGRATING ORAL EVERY 6 HOURS PRN
Qty: 20 TABLET | Refills: 0 | Status: SHIPPED | OUTPATIENT
Start: 2024-05-30 | End: 2024-06-03

## 2024-05-30 RX ORDER — PROMETHAZINE HYDROCHLORIDE 25 MG/1
25 SUPPOSITORY RECTAL EVERY 6 HOURS PRN
Qty: 10 SUPPOSITORY | Refills: 0 | Status: SHIPPED | OUTPATIENT
Start: 2024-05-30

## 2024-05-30 RX ORDER — PYRIDOXINE HCL (VITAMIN B6) 25 MG
25 TABLET ORAL 2 TIMES DAILY
Qty: 20 TABLET | Refills: 0 | Status: SHIPPED | OUTPATIENT
Start: 2024-05-30

## 2024-05-30 RX ORDER — METOCLOPRAMIDE 10 MG/1
10 TABLET ORAL EVERY 6 HOURS
Qty: 30 TABLET | Refills: 0 | Status: SHIPPED | OUTPATIENT
Start: 2024-05-30

## 2024-05-30 RX ORDER — METOCLOPRAMIDE HYDROCHLORIDE 5 MG/ML
10 INJECTION INTRAMUSCULAR; INTRAVENOUS
Status: COMPLETED | OUTPATIENT
Start: 2024-05-30 | End: 2024-05-30

## 2024-05-30 RX ADMIN — METOCLOPRAMIDE 10 MG: 5 INJECTION, SOLUTION INTRAMUSCULAR; INTRAVENOUS at 11:05

## 2024-05-30 RX ADMIN — SODIUM CHLORIDE, POTASSIUM CHLORIDE, SODIUM LACTATE AND CALCIUM CHLORIDE 1000 ML: 600; 310; 30; 20 INJECTION, SOLUTION INTRAVENOUS at 11:05

## 2024-05-30 NOTE — Clinical Note
"Frankie Loco"Radha was seen and treated in our emergency department on 5/30/2024.  She may return to work on 06/03/2024.       If you have any questions or concerns, please don't hesitate to call.      Mikaela Jones, SUZIEP"

## 2024-05-30 NOTE — ED PROVIDER NOTES
Source of History:  Patient     Chief complaint:  Emesis During Pregnancy (Pt reports intermittent n/v that has been worsening over past few weeks with weakness and dizziness. Pt states she is ~10 wk pregnant, was told by OB to go to ED for evaluation)      HPI:  Frankie Garcia is a 29 y.o. female presenting to the emergency department reporting intractable nausea vomiting for the last few weeks, reports worse over the past few days now feeling weak and having some dizziness when standing.  Reports she was proximally 10 weeks pregnant.  Reports had severe morning sickness with her 1st pregnancy.  She denies any abdominal pain or any vaginal bleeding.  Denies any diarrhea or fever.    This is the extent to the patients complaints today here in the emergency department.    PMH:  As per HPI and below:  Past Medical History:   Diagnosis Date    Anemia     Influenza 2019    Pyelonephritis 2019    sepsis    Seasonal allergic rhinitis 2021     Past Surgical History:   Procedure Laterality Date    NO PAST SURGERIES  2019       Social History     Tobacco Use    Smoking status: Former     Current packs/day: 0.00     Average packs/day: 0.1 packs/day for 2.0 years (0.2 ttl pk-yrs)     Types: Cigarettes     Start date: 2017     Quit date: 2019     Years since quittin.3    Smokeless tobacco: Never   Substance Use Topics    Alcohol use: Not Currently     Comment: socially, monthly  - 2 drinks per month    Drug use: No       Review of patient's allergies indicates:   Allergen Reactions    Ciprofloxacin Hives    Sulfamethoxazole-trimethoprim Hives    Doxycycline      Swelling and rash to face    Prednisone (bulk) Hives, Itching, Palpitations and Rash       ROS: As per HPI and below:  General: No  fever.  No chills.    ENT: No sore throat. No ear pain  Chest: No shortness of breath. No cough.    Cardiovascular: No chest pain.   Abdomen:  No abdominal pain.  +nausea and vomiting, no  "diarrhea  Genito-Urinary: No abnormal urination.  No vaginal bleeding or spotting.  Neurologic: No focal weakness.  No numbness.  No headache  MSK: no back pain.   Integument: No rashes or lesions.    Physical Exam:    /66   Pulse 67   Temp 98.2 °F (36.8 °C) (Oral)   Resp 18   Ht 5' 6" (1.676 m)   Wt 116.1 kg (256 lb)   LMP 03/13/2024   SpO2 100%   Breastfeeding No   BMI 41.32 kg/m²   Vitals:    05/30/24 1029 05/30/24 1352   BP: 119/61 117/66   Pulse: 75 67   Resp: 20 18   Temp: 98.1 °F (36.7 °C) 98.2 °F (36.8 °C)   TempSrc: Oral Oral   SpO2: 100% 100%   Weight: 116.1 kg (256 lb)    Height: 5' 6" (1.676 m)        Nursing note and vital signs reviewed.  Appearance: No acute distress. Well-appearing. Non-toxic.    Eyes: No conjunctival injection.  Extraocular muscles are intact.  ENT:  Mucous membranes are dry.  Uvula is midline, no trismus.  No exudate to the posterior pharynx.  Chest/ Respiratory: Clear to auscultation bilaterally.  Good air movement.  No wheezes.  No rhonchi. No rales. No accessory muscle use.  Cardiovascular: Regular rate and rhythm.  No murmurs. No gallops. No rubs.  Abdomen: Soft.  Nontender to palpation, bowel sounds normal, no distention or guarding.  Back:  No CVA tenderness.  Musculoskeletal: Good range of motion all joints.  No deformities.  Neck supple.  No meningismus.  Skin: No rashes seen.  Good turgor.  No abrasions.  No ecchymoses.  Neuro: alert and oriented x3,  no focal neurological deficits.  Psych: Appropriate, conversant      Labs Reviewed   CBC W/ AUTO DIFFERENTIAL - Abnormal; Notable for the following components:       Result Value    Hematocrit 36.9 (*)     All other components within normal limits   COMPREHENSIVE METABOLIC PANEL - Abnormal; Notable for the following components:    Sodium 134 (*)     CO2 19 (*)     Anion Gap 6 (*)     All other components within normal limits   URINALYSIS, REFLEX TO URINE CULTURE - Abnormal; Notable for the following components: "    Appearance, UA Cloudy (*)     Occult Blood UA Trace (*)     Leukocytes, UA 3+ (*)     All other components within normal limits    Narrative:     Specimen Source->Urine   URINALYSIS MICROSCOPIC - Abnormal; Notable for the following components:    WBC, UA 15 (*)     All other components within normal limits    Narrative:     Specimen Source->Urine   CULTURE, URINE   LIPASE       No orders to display         Initial Impression/ Differential Dx:  Differential diagnosis includes but not limited to: GERD, intestinal spasm, gastroenteritis, gastritis, ulcer, cholecystitis, cholelithiasis, gallstones, pancreatitis, ileus, small bowel obstruction, appendicitis, diverticulitis, colitis, constipation, intestinal gas pain      MDM:    Medical Decision Making  29-year-old female with intractable nausea vomiting for the past few weeks, worse over the past few days who came in after complaining of weakness.  She was proximally 10 weeks pregnant.  On exam she is some dry mucous membranes, benign abdominal exam.  Labs are reassuring, no leukocytosis stable H&H, no significant electrolyte abnormalities.  No NADYA.  UA with 15 squamous cells, likely a contamination.  She also has no complaints of any dysuria.  Will await culture results.  She was treated with fluids and antiemetics and had significant improvement in symptoms.  She was at a tolerate a p.o. challenge emergency department.  Will discharge patient home with multiple antiemetics and close follow up with her OBGYN.  Discussed bland diet.  If symptoms return or she has persistent vomiting even with use of antiemetics she can return to emergency department for re-evaluation.  She stated understanding    Amount and/or Complexity of Data Reviewed  Labs: ordered. Decision-making details documented in ED Course.    Risk  OTC drugs.  Prescription drug management.        ED Course as of 05/30/24 1358   Thu May 30, 2024   1126 WBC: 6.91 [AS]   1126 Hemoglobin: 12.4 [AS]   1126  Hematocrit(!): 36.9 [AS]   1126 Platelet Count: 218 [AS]   1316 Squam Epithel, UA: 15 [AS]   1317 WBC, UA(!): 15  Large amount of squamous cells, likely a contaminated sample.  Will follow culture results [AS]   1317 Leukocyte Esterase, UA(!): 3+ [AS]   1317 NITRITE UA: Negative [AS]      ED Course User Index  [AS] Mikaela Jones FNP       Diagnostic Impression:    1. Hyperemesis gravidarum         ED Disposition Condition    Discharge Stable            ED Prescriptions       Medication Sig Dispense Start Date End Date Auth. Provider    ondansetron (ZOFRAN-ODT) 4 MG TbDL Take 1 tablet (4 mg total) by mouth every 6 (six) hours as needed. 20 tablet 5/30/2024 6/3/2024 Mikaela Jones FNP    metoclopramide HCl (REGLAN) 10 MG tablet Take 1 tablet (10 mg total) by mouth every 6 (six) hours. 30 tablet 5/30/2024 -- Mikaela Jones FNP    promethazine (PHENERGAN) 25 MG suppository Place 1 suppository (25 mg total) rectally every 6 (six) hours as needed for Nausea. 10 suppository 5/30/2024 -- Mikaela Jones FNP    pyridoxine, vitamin B6, (B-6) 25 MG Tab Take 1 tablet (25 mg total) by mouth 2 (two) times a day. 20 tablet 5/30/2024 -- Mikaela Jones FNP    doxylamine succinate 25 mg tablet Take 1 tablet (25 mg total) by mouth 2 (two) times a day. 20 tablet 5/30/2024 -- Mikaela Jones FNP          Follow-up Information       Follow up With Specialties Details Why Contact Info    Jelani Nolan MD Internal Medicine Schedule an appointment as soon as possible for a visit in 3 days  [address] [facility] [address]  [phone]      [facility] Emergency Dept Emergency Medicine Go to  If symptoms worsen [address] [facility] [facility] [address] [phone]               Mikaela Jones FNP  05/30/24 5479

## 2024-05-30 NOTE — ED NOTES
"Pt to ED with intermittent N/V "for weeks", approx. 10 weeks pregnant, . Also admits dizziness and fatigue. Denies abd pain, vaginal bleeding, hematemsis. Sent by OB for eval. Used ODT zofran at home, states "It didn't do anything for me, so I stopped taking it". No active emesis on arrival to ED.  "

## 2024-05-30 NOTE — ED NOTES
PO challenge started, pt given 6oz water, will reassess in approx. 20-30min. Pt instructed to call RN with any new episodes of vomiting. Pt verbalized understanding.

## 2024-05-31 LAB
BACTERIA UR CULT: NORMAL
BACTERIA UR CULT: NORMAL

## 2024-06-04 ENCOUNTER — PATIENT MESSAGE (OUTPATIENT)
Dept: OBSTETRICS AND GYNECOLOGY | Facility: CLINIC | Age: 29
End: 2024-06-04
Payer: COMMERCIAL

## 2024-06-13 ENCOUNTER — INITIAL PRENATAL (OUTPATIENT)
Dept: OBSTETRICS AND GYNECOLOGY | Facility: CLINIC | Age: 29
End: 2024-06-13
Payer: COMMERCIAL

## 2024-06-13 VITALS
DIASTOLIC BLOOD PRESSURE: 80 MMHG | BODY MASS INDEX: 42.68 KG/M2 | SYSTOLIC BLOOD PRESSURE: 118 MMHG | WEIGHT: 264.44 LBS

## 2024-06-13 DIAGNOSIS — Z34.80 SUPERVISION OF OTHER NORMAL PREGNANCY, ANTEPARTUM: Primary | ICD-10-CM

## 2024-06-13 PROCEDURE — 0500F INITIAL PRENATAL CARE VISIT: CPT | Mod: CPTII,S$GLB,, | Performed by: OBSTETRICS & GYNECOLOGY

## 2024-06-13 PROCEDURE — 99999 PR PBB SHADOW E&M-EST. PATIENT-LVL III: CPT | Mod: PBBFAC,,, | Performed by: OBSTETRICS & GYNECOLOGY

## 2024-06-30 ENCOUNTER — PATIENT MESSAGE (OUTPATIENT)
Dept: ADMINISTRATIVE | Facility: OTHER | Age: 29
End: 2024-06-30
Payer: COMMERCIAL

## 2024-06-30 NOTE — PROGRESS NOTES
Complaints today: Ms. Garcia reports that she is doing well with no complaints. She denies  LOF or contractions at this time.         /80   Wt 120 kg (264 lb 7.1 oz)   LMP 2024   BMI 42.68 kg/m²     29 y.o., at 14w0d by Estimated Date of Delivery: 24  Patient Active Problem List   Diagnosis    KRIS (iron deficiency anemia)    Functional diarrhea    Hyperemesis    Positive GBS test    BMI 45.0-49.9, adult    HSV-2 (herpes simplex virus 2) infection    Seasonal allergic rhinitis    Amenorrhea    Supervision of other normal pregnancy, antepartum     OB History    Para Term  AB Living   2 1 1     1   SAB IAB Ectopic Multiple Live Births         0 1      # Outcome Date GA Lbr Atif/2nd Weight Sex Type Anes PTL Lv   2 Current            1 Term 20 39w5d  2.863 kg (6 lb 5 oz) M Vag-Spont EPI N LATOYA       Dating reviewed    Allergies and problem list reviewed and updated    Medical and surgical history reviewed    Prenatal labs reviewed and updated    Physical Exam:  ABD: soft, gravid, nontender,     Assessment:  Frankie was seen today for initial prenatal visit.    Diagnoses and all orders for this visit:    Supervision of other normal pregnancy, antepartum  -     Connected MOM Enrollment  -     Assign Connected MOM Program Consent Questionnaire  - Labs and US reviewed        Orders Placed This Encounter   Procedures    Connected MOM Enrollment       Follow up in about 4 weeks (around 2024) for Routine OB.

## 2024-07-11 ENCOUNTER — ROUTINE PRENATAL (OUTPATIENT)
Dept: OBSTETRICS AND GYNECOLOGY | Facility: CLINIC | Age: 29
End: 2024-07-11
Payer: COMMERCIAL

## 2024-07-11 VITALS — BODY MASS INDEX: 41.56 KG/M2 | SYSTOLIC BLOOD PRESSURE: 124 MMHG | WEIGHT: 257.5 LBS | DIASTOLIC BLOOD PRESSURE: 62 MMHG

## 2024-07-11 DIAGNOSIS — R42 DIZZINESSES: ICD-10-CM

## 2024-07-11 DIAGNOSIS — Z34.80 SUPERVISION OF OTHER NORMAL PREGNANCY, ANTEPARTUM: Primary | ICD-10-CM

## 2024-07-11 DIAGNOSIS — O21.9 NAUSEA/VOMITING IN PREGNANCY: ICD-10-CM

## 2024-07-11 PROCEDURE — 99999 PR PBB SHADOW E&M-EST. PATIENT-LVL III: CPT | Mod: PBBFAC,,, | Performed by: OBSTETRICS & GYNECOLOGY

## 2024-07-11 PROCEDURE — 0502F SUBSEQUENT PRENATAL CARE: CPT | Mod: CPTII,S$GLB,, | Performed by: OBSTETRICS & GYNECOLOGY

## 2024-07-11 RX ORDER — METOCLOPRAMIDE 10 MG/1
10 TABLET ORAL EVERY 6 HOURS
Qty: 30 TABLET | Refills: 0 | Status: SHIPPED | OUTPATIENT
Start: 2024-07-11

## 2024-07-11 NOTE — PROGRESS NOTES
Complaints today:Ms. Garcia reports that she has nausea and lack of appetite. She also reports that she dizziness when she stands and sits up. Normal fetal movements with no vaginal bleeding, LOF or contractions at this time.     /62   Wt 116.8 kg (257 lb 8 oz)   LMP 2024   BMI 41.56 kg/m²     29 y.o., at 15w4d by Estimated Date of Delivery: 24  Patient Active Problem List   Diagnosis    KRIS (iron deficiency anemia)    Functional diarrhea    Hyperemesis    Positive GBS test    BMI 45.0-49.9, adult    HSV-2 (herpes simplex virus 2) infection    Seasonal allergic rhinitis    Amenorrhea    Supervision of other normal pregnancy, antepartum     OB History    Para Term  AB Living   2 1 1     1   SAB IAB Ectopic Multiple Live Births         0 1      # Outcome Date GA Lbr Atif/2nd Weight Sex Type Anes PTL Lv   2 Current            1 Term 20 39w5d  2.863 kg (6 lb 5 oz) M Vag-Spont EPI N LATOYA       Dating reviewed    Allergies and problem list reviewed and updated    Medical and surgical history reviewed    Prenatal labs reviewed and updated    Physical Exam:  ABD: soft, gravid, nontender    Assessment:  Frankie was seen today for routine prenatal visit.    Diagnoses and all orders for this visit:    Supervision of other normal pregnancy, antepartum    Dizzinesses  - Encourage patient to eat what she can tolerate  - Gummy prenatals  - Encouraged proper water intake    Nausea/vomiting in pregnancy  -     metoclopramide HCl (REGLAN) 10 MG tablet; Take 1 tablet (10 mg total) by mouth every 6 (six) hours.        No orders of the defined types were placed in this encounter.      Follow up in about 4 weeks (around 2024) for Routine OB.

## 2024-07-14 ENCOUNTER — PATIENT MESSAGE (OUTPATIENT)
Dept: OTHER | Facility: OTHER | Age: 29
End: 2024-07-14
Payer: COMMERCIAL

## 2024-07-19 ENCOUNTER — PATIENT MESSAGE (OUTPATIENT)
Dept: OBSTETRICS AND GYNECOLOGY | Facility: CLINIC | Age: 29
End: 2024-07-19
Payer: COMMERCIAL

## 2024-07-21 ENCOUNTER — PATIENT MESSAGE (OUTPATIENT)
Dept: OTHER | Facility: OTHER | Age: 29
End: 2024-07-21
Payer: COMMERCIAL

## 2024-07-29 DIAGNOSIS — Z34.80 SUPERVISION OF OTHER NORMAL PREGNANCY, ANTEPARTUM: Primary | ICD-10-CM

## 2024-08-08 ENCOUNTER — ROUTINE PRENATAL (OUTPATIENT)
Dept: OBSTETRICS AND GYNECOLOGY | Facility: CLINIC | Age: 29
End: 2024-08-08
Payer: COMMERCIAL

## 2024-08-08 VITALS
BODY MASS INDEX: 43.06 KG/M2 | SYSTOLIC BLOOD PRESSURE: 120 MMHG | DIASTOLIC BLOOD PRESSURE: 60 MMHG | WEIGHT: 266.75 LBS

## 2024-08-08 DIAGNOSIS — Z34.80 SUPERVISION OF OTHER NORMAL PREGNANCY, ANTEPARTUM: Primary | ICD-10-CM

## 2024-08-08 PROCEDURE — 0502F SUBSEQUENT PRENATAL CARE: CPT | Mod: CPTII,S$GLB,, | Performed by: OBSTETRICS & GYNECOLOGY

## 2024-08-08 PROCEDURE — 99999 PR PBB SHADOW E&M-EST. PATIENT-LVL III: CPT | Mod: PBBFAC,,, | Performed by: OBSTETRICS & GYNECOLOGY

## 2024-08-11 ENCOUNTER — PATIENT MESSAGE (OUTPATIENT)
Dept: OTHER | Facility: OTHER | Age: 29
End: 2024-08-11
Payer: COMMERCIAL

## 2024-08-12 ENCOUNTER — PROCEDURE VISIT (OUTPATIENT)
Dept: MATERNAL FETAL MEDICINE | Facility: CLINIC | Age: 29
End: 2024-08-12
Payer: COMMERCIAL

## 2024-08-12 DIAGNOSIS — Z34.80 SUPERVISION OF OTHER NORMAL PREGNANCY, ANTEPARTUM: ICD-10-CM

## 2024-08-12 DIAGNOSIS — Z36.2 ENCOUNTER FOR FOLLOW-UP ULTRASOUND OF FETAL ANATOMY: Primary | ICD-10-CM

## 2024-08-12 PROCEDURE — 76811 OB US DETAILED SNGL FETUS: CPT | Mod: S$GLB,,, | Performed by: OBSTETRICS & GYNECOLOGY

## 2024-08-14 ENCOUNTER — TELEPHONE (OUTPATIENT)
Dept: OBSTETRICS AND GYNECOLOGY | Facility: CLINIC | Age: 29
End: 2024-08-14
Payer: COMMERCIAL

## 2024-08-16 ENCOUNTER — TELEPHONE (OUTPATIENT)
Dept: OBSTETRICS AND GYNECOLOGY | Facility: CLINIC | Age: 29
End: 2024-08-16
Payer: COMMERCIAL

## 2024-08-21 ENCOUNTER — TELEPHONE (OUTPATIENT)
Dept: OBSTETRICS AND GYNECOLOGY | Facility: CLINIC | Age: 29
End: 2024-08-21
Payer: COMMERCIAL

## 2024-08-21 NOTE — TELEPHONE ENCOUNTER
Spoke with pt to inform that Dr. Balderas will not be in clinic on 09/05 and pt informed that she already transferred her care to [facility].

## 2024-09-08 ENCOUNTER — PATIENT MESSAGE (OUTPATIENT)
Dept: OTHER | Facility: OTHER | Age: 29
End: 2024-09-08
Payer: COMMERCIAL

## 2024-09-12 ENCOUNTER — ROUTINE PRENATAL (OUTPATIENT)
Dept: OBSTETRICS AND GYNECOLOGY | Facility: CLINIC | Age: 29
End: 2024-09-12
Payer: COMMERCIAL

## 2024-09-12 ENCOUNTER — PROCEDURE VISIT (OUTPATIENT)
Dept: MATERNAL FETAL MEDICINE | Facility: CLINIC | Age: 29
End: 2024-09-12
Payer: COMMERCIAL

## 2024-09-12 ENCOUNTER — LAB VISIT (OUTPATIENT)
Dept: LAB | Facility: OTHER | Age: 29
End: 2024-09-12
Attending: OBSTETRICS & GYNECOLOGY
Payer: COMMERCIAL

## 2024-09-12 ENCOUNTER — TELEPHONE (OUTPATIENT)
Dept: OBSTETRICS AND GYNECOLOGY | Facility: CLINIC | Age: 29
End: 2024-09-12
Payer: COMMERCIAL

## 2024-09-12 VITALS
BODY MASS INDEX: 44.02 KG/M2 | WEIGHT: 272.69 LBS | DIASTOLIC BLOOD PRESSURE: 60 MMHG | SYSTOLIC BLOOD PRESSURE: 110 MMHG

## 2024-09-12 DIAGNOSIS — Z34.82 ENCOUNTER FOR SUPERVISION OF OTHER NORMAL PREGNANCY IN SECOND TRIMESTER: Primary | ICD-10-CM

## 2024-09-12 DIAGNOSIS — O99.012 ANEMIA DURING PREGNANCY IN SECOND TRIMESTER: ICD-10-CM

## 2024-09-12 DIAGNOSIS — Z36.2 ENCOUNTER FOR FOLLOW-UP ULTRASOUND OF FETAL ANATOMY: ICD-10-CM

## 2024-09-12 DIAGNOSIS — R12 HEARTBURN DURING PREGNANCY IN SECOND TRIMESTER: ICD-10-CM

## 2024-09-12 DIAGNOSIS — O26.892 HEARTBURN DURING PREGNANCY IN SECOND TRIMESTER: ICD-10-CM

## 2024-09-12 DIAGNOSIS — Z34.82 ENCOUNTER FOR SUPERVISION OF OTHER NORMAL PREGNANCY IN SECOND TRIMESTER: ICD-10-CM

## 2024-09-12 LAB
BASOPHILS # BLD AUTO: 0.02 K/UL (ref 0–0.2)
BASOPHILS NFR BLD: 0.2 % (ref 0–1.9)
DIFFERENTIAL METHOD BLD: ABNORMAL
EOSINOPHIL # BLD AUTO: 0.1 K/UL (ref 0–0.5)
EOSINOPHIL NFR BLD: 1.4 % (ref 0–8)
ERYTHROCYTE [DISTWIDTH] IN BLOOD BY AUTOMATED COUNT: 13.2 % (ref 11.5–14.5)
FERRITIN SERPL-MCNC: 9 NG/ML (ref 20–300)
GLUCOSE SERPL-MCNC: 80 MG/DL (ref 70–140)
HCT VFR BLD AUTO: 31.5 % (ref 37–48.5)
HGB BLD-MCNC: 10.8 G/DL (ref 12–16)
IMM GRANULOCYTES # BLD AUTO: 0.05 K/UL (ref 0–0.04)
IMM GRANULOCYTES NFR BLD AUTO: 0.6 % (ref 0–0.5)
IRON SERPL-MCNC: 86 UG/DL (ref 30–160)
LYMPHOCYTES # BLD AUTO: 1.6 K/UL (ref 1–4.8)
LYMPHOCYTES NFR BLD: 19.1 % (ref 18–48)
MCH RBC QN AUTO: 31.3 PG (ref 27–31)
MCHC RBC AUTO-ENTMCNC: 34.3 G/DL (ref 32–36)
MCV RBC AUTO: 91 FL (ref 82–98)
MONOCYTES # BLD AUTO: 0.5 K/UL (ref 0.3–1)
MONOCYTES NFR BLD: 6.5 % (ref 4–15)
NEUTROPHILS # BLD AUTO: 6 K/UL (ref 1.8–7.7)
NEUTROPHILS NFR BLD: 72.2 % (ref 38–73)
NRBC BLD-RTO: 0 /100 WBC
PLATELET # BLD AUTO: 212 K/UL (ref 150–450)
PMV BLD AUTO: 10 FL (ref 9.2–12.9)
RBC # BLD AUTO: 3.45 M/UL (ref 4–5.4)
SATURATED IRON: 21 % (ref 20–50)
TOTAL IRON BINDING CAPACITY: 406 UG/DL (ref 250–450)
TRANSFERRIN SERPL-MCNC: 274 MG/DL (ref 200–375)
WBC # BLD AUTO: 8.34 K/UL (ref 3.9–12.7)

## 2024-09-12 PROCEDURE — 85025 COMPLETE CBC W/AUTO DIFF WBC: CPT | Performed by: OBSTETRICS & GYNECOLOGY

## 2024-09-12 PROCEDURE — 84466 ASSAY OF TRANSFERRIN: CPT | Performed by: OBSTETRICS & GYNECOLOGY

## 2024-09-12 PROCEDURE — 76816 OB US FOLLOW-UP PER FETUS: CPT | Mod: S$GLB,,, | Performed by: OBSTETRICS & GYNECOLOGY

## 2024-09-12 PROCEDURE — 82728 ASSAY OF FERRITIN: CPT | Performed by: OBSTETRICS & GYNECOLOGY

## 2024-09-12 PROCEDURE — 0502F SUBSEQUENT PRENATAL CARE: CPT | Mod: CPTII,S$GLB,, | Performed by: OBSTETRICS & GYNECOLOGY

## 2024-09-12 PROCEDURE — 99999 PR PBB SHADOW E&M-EST. PATIENT-LVL III: CPT | Mod: PBBFAC,,, | Performed by: OBSTETRICS & GYNECOLOGY

## 2024-09-12 PROCEDURE — 82950 GLUCOSE TEST: CPT | Performed by: OBSTETRICS & GYNECOLOGY

## 2024-09-12 RX ORDER — ASPIRIN 81 MG/1
81 TABLET ORAL DAILY
COMMUNITY
Start: 2024-09-12 | End: 2025-06-19

## 2024-09-12 RX ORDER — FAMOTIDINE 40 MG/1
40 TABLET, FILM COATED ORAL NIGHTLY PRN
Qty: 30 TABLET | Refills: 3 | Status: SHIPPED | OUTPATIENT
Start: 2024-09-12 | End: 2025-09-12

## 2024-09-12 NOTE — PROGRESS NOTES
Pt is a  here for transfer of care from the .  Would like to deliver at [facility].  Pt has hx of  of male.  5 y/o at home.  Denies any issues at this time.  Denies regular CTX, VB, VD, LOF.  + FM.  Continue PNV.  Labor precautions reviewed.  Pt to RTC in 4 weeks.  Pt is having some nausea.  No vomiting anymore.  Taking some tums for indigestion.  Will send in Rx for pepcid.  Pt had low iron with last pregnancy.  Will get iron studies added to glucose and CBC labs work today.  Had reaction to Tdap vaccine.  Will not do Tdap at this time.

## 2024-09-17 DIAGNOSIS — Z36.89 ENCOUNTER FOR ULTRASOUND TO ASSESS FETAL GROWTH: Primary | ICD-10-CM

## 2024-09-22 ENCOUNTER — PATIENT MESSAGE (OUTPATIENT)
Dept: OTHER | Facility: OTHER | Age: 29
End: 2024-09-22
Payer: COMMERCIAL

## 2024-10-02 ENCOUNTER — PATIENT MESSAGE (OUTPATIENT)
Dept: OBSTETRICS AND GYNECOLOGY | Facility: CLINIC | Age: 29
End: 2024-10-02
Payer: COMMERCIAL

## 2024-10-03 ENCOUNTER — HOSPITAL ENCOUNTER (EMERGENCY)
Facility: OTHER | Age: 29
Discharge: HOME | End: 2024-10-03
Attending: OBSTETRICS & GYNECOLOGY
Payer: COMMERCIAL

## 2024-10-03 VITALS
DIASTOLIC BLOOD PRESSURE: 53 MMHG | OXYGEN SATURATION: 96 % | TEMPERATURE: 98 F | RESPIRATION RATE: 18 BRPM | HEART RATE: 78 BPM | SYSTOLIC BLOOD PRESSURE: 105 MMHG

## 2024-10-03 DIAGNOSIS — Z3A.27 27 WEEKS GESTATION OF PREGNANCY: ICD-10-CM

## 2024-10-03 DIAGNOSIS — O23.42 URINARY TRACT INFECTION AFFECTING CARE OF MOTHER IN SECOND TRIMESTER, ANTEPARTUM: Primary | ICD-10-CM

## 2024-10-03 LAB
BACTERIA #/AREA URNS HPF: ABNORMAL /HPF
BILIRUB SERPL-MCNC: NORMAL MG/DL
BILIRUB UR QL STRIP: NEGATIVE
BLOOD URINE, POC: NORMAL
CLARITY UR: ABNORMAL
COLOR UR: YELLOW
COLOR, POC UA: NORMAL
GLUCOSE UR QL STRIP: NEGATIVE
GLUCOSE UR QL STRIP: NORMAL
HGB UR QL STRIP: NEGATIVE
KETONES UR QL STRIP: ABNORMAL
KETONES UR QL STRIP: NORMAL
LEUKOCYTE ESTERASE UR QL STRIP: ABNORMAL
LEUKOCYTE ESTERASE URINE, POC: NORMAL
MICROSCOPIC COMMENT: ABNORMAL
NITRITE UR QL STRIP: NEGATIVE
NITRITE, POC UA: NORMAL
PH UR STRIP: 6 [PH] (ref 5–8)
PH, POC UA: 5
PROT UR QL STRIP: ABNORMAL
PROTEIN, POC: NORMAL
RBC #/AREA URNS HPF: 6 /HPF (ref 0–4)
SP GR UR STRIP: 1.02 (ref 1–1.03)
SPECIFIC GRAVITY, POC UA: 1.02
SQUAMOUS #/AREA URNS HPF: 32 /HPF
URN SPEC COLLECT METH UR: ABNORMAL
UROBILINOGEN UR STRIP-ACNC: NEGATIVE EU/DL
UROBILINOGEN, POC UA: NORMAL
WBC #/AREA URNS HPF: 51 /HPF (ref 0–5)

## 2024-10-03 PROCEDURE — 87086 URINE CULTURE/COLONY COUNT: CPT

## 2024-10-03 PROCEDURE — 99284 EMERGENCY DEPT VISIT MOD MDM: CPT | Mod: 25

## 2024-10-03 PROCEDURE — 99284 EMERGENCY DEPT VISIT MOD MDM: CPT | Mod: 25,,, | Performed by: OBSTETRICS & GYNECOLOGY

## 2024-10-03 PROCEDURE — 59025 FETAL NON-STRESS TEST: CPT

## 2024-10-03 PROCEDURE — 59025 FETAL NON-STRESS TEST: CPT | Mod: 26,,, | Performed by: OBSTETRICS & GYNECOLOGY

## 2024-10-03 PROCEDURE — 81000 URINALYSIS NONAUTO W/SCOPE: CPT

## 2024-10-03 PROCEDURE — 81002 URINALYSIS NONAUTO W/O SCOPE: CPT

## 2024-10-03 RX ORDER — NITROFURANTOIN 25; 75 MG/1; MG/1
100 CAPSULE ORAL 2 TIMES DAILY
Qty: 10 CAPSULE | Refills: 0 | Status: SHIPPED | OUTPATIENT
Start: 2024-10-03 | End: 2024-10-08

## 2024-10-03 NOTE — DISCHARGE INSTRUCTIONS
Contact your primary OB or after hours at [phone] (option 1) if you experience any of the following:    Contractions every 7-10 minutes for 1 or more hours.   A sudden gush or constant leaking of fluid.  Heavy vaginal bleeding.   If you experience a constant headache, blurry vision, pain underneath your right rib, or sudden swelling of your hands, feet, and face.   If you are 28 weeks pregnant or greater, you can measure kick counts with a goal of 10 or more movements within 2 hours.     Remember to stay hydrated; drink 8-10 bottles of water a day.     Maintain all follow-up appointments.

## 2024-10-03 NOTE — TELEPHONE ENCOUNTER
Spoke to patient regarding concerns of stretching and pulling discomfort that started yesterday afternoon. States it improved somewhat at night and started this morning with walking and positional changes. States she has been wearing a maternity belt, hydrating, and taken tylenol this morning. Denies bleeding and severe consistent cramping. Advised patient that it is likely round ligament pain. Patient expressed concerns due to experiencing discomfort since yesterday not resolving. Advised patient that Dr. Robles is on night call in the hospital. Scheduled appointment with NP on 10/4 at 9 am. Also advised to go to OB ED if worsens with no relief for evaluation.

## 2024-10-03 NOTE — ED PROVIDER NOTES
Encounter Date: 10/3/2024       History     Chief Complaint   Patient presents with    Pelvic Pain     Frankie Garcia is a 29 y.o. [id number] at 27w4d presents complaining of pelvic cramping.   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  Patient denies contractions, denies vaginal bleeding, denies LOF.   Fetal Movement: normal    She reports pelvic cramping for the past two days, mostly on right side that radiates to groin and back.   She denies fever, chills, dysuria, or hematuria.         Review of patient's allergies indicates:   Allergen Reactions    Ciprofloxacin Hives    Sulfamethoxazole-trimethoprim Hives    Doxycycline      Swelling and rash to face    Prednisone (bulk) Hives, Itching, Palpitations and Rash     Past Medical History:   Diagnosis Date    Anemia     Influenza 2019    Pyelonephritis 2019    sepsis    Seasonal allergic rhinitis 2021     Past Surgical History:   Procedure Laterality Date    NO PAST SURGERIES  2019     Family History   Problem Relation Name Age of Onset    No Known Problems Mother      Arthritis Father Andrew Garcia     Thyroid disease Father Andrew Garcia     No Known Problems Sister      No Known Problems Brother      Diabetes Maternal Aunt B     Thyroid disease Maternal Grandmother      Arthritis Maternal Grandfather      Thyroid disease Paternal Grandmother      Breast cancer Neg Hx      Colon cancer Neg Hx      Ovarian cancer Neg Hx      Melanoma Neg Hx      Esophageal cancer Neg Hx       Social History     Tobacco Use    Smoking status: Former     Current packs/day: 0.00     Average packs/day: 0.1 packs/day for 2.0 years (0.2 ttl pk-yrs)     Types: Cigarettes     Start date: 2017     Quit date: 2019     Years since quittin.7    Smokeless tobacco: Never   Substance Use Topics    Alcohol use: Not Currently     Comment: socially, monthly  - 2 drinks per month    Drug use: No     Review of Systems   Constitutional:  Negative for chills and fatigue.   HENT:   Negative for congestion and sore throat.    Eyes:  Negative for visual disturbance.   Respiratory:  Negative for chest tightness and shortness of breath.    Cardiovascular:  Negative for chest pain and leg swelling.   Gastrointestinal:  Negative for abdominal pain.   Genitourinary:  Positive for pelvic pain. Negative for dysuria, hematuria, vaginal bleeding and vaginal discharge.   Neurological:  Negative for headaches.       Physical Exam     Initial Vitals [10/03/24 1540]   BP Pulse Resp Temp SpO2   (!) 120/57 77 18 98.2 °F (36.8 °C) 98 %      MAP       --         Physical Exam    Vitals reviewed.  Constitutional: She appears well-developed and well-nourished. No distress.   HENT:   Head: Normocephalic and atraumatic.   Neck: Neck supple.   Normal range of motion.  Pulmonary/Chest: No respiratory distress.   Abdominal: There is no abdominal tenderness.   Gravid abdomen   No right CVA tenderness.  No left CVA tenderness. There is no rebound and no guarding.   Musculoskeletal:      Cervical back: Normal range of motion and neck supple.     Neurological: She is alert and oriented to person, place, and time.   Skin: Skin is warm and dry.   Psychiatric: She has a normal mood and affect.         ED Course   Fetal non-stress test    Date/Time: 10/3/2024 4:12 PM    Performed by: Nicol Bautista MD  Authorized by: Rosa Isela Zapien MD    Nonstress Test:     Variability:  6-25 BPM    Decelerations:  None    Baseline:  145    Uterine Irritability: No      Contractions:  Not present  Biophysical Profile:     Nonstress Test Interpretation: appropriate for gestational age      Overall Impression:  Reassuring    Labs Reviewed   URINALYSIS, REFLEX TO URINE CULTURE - Abnormal       Result Value    Specimen UA Urine, Clean Catch      Color, UA Yellow      Appearance, UA Hazy (*)     pH, UA 6.0      Specific Gravity, UA 1.020      Protein, UA Trace (*)     Glucose, UA Negative      Ketones, UA 3+ (*)     Bilirubin (UA) Negative       Occult Blood UA Negative      Nitrite, UA Negative      Urobilinogen, UA Negative      Leukocytes, UA 3+ (*)     Narrative:     Specimen Source->Urine   URINALYSIS MICROSCOPIC - Abnormal    RBC, UA 6 (*)     WBC, UA 51 (*)     Bacteria Moderate (*)     Squam Epithel, UA 32      Microscopic Comment SEE COMMENT      Narrative:     Specimen Source->Urine   CULTURE, URINE   POCT URINALYSIS, DIPSTICK OR TABLET REAGENT, AUTOMATED, WITH MICROSCOP    Color, UA        Spec Grav UA 1.020      pH, UA 5      WBC, UA ++      Nitrite, UA neg      Protein, POC trace      Glucose, UA norm      Ketones, UA +++      Urobilinogen, UA norm      Bilirubin, POC neg      Blood, UA neg            Imaging Results    None          Medications - No data to display  Medical Decision Making  Frankie Garcia is a 29 y.o. [id number] at 27w4d presents complaining of pelvic cramping.   Temp:  [98.2 °F (36.8 °C)] 98.2 °F (36.8 °C)  Pulse:  [76-84] 76  Resp:  [18] 18  SpO2:  [97 %-99 %] 97 %  BP: (120)/(57) 120/57    PE: abdomen soft, non-tender. No rebound or guarding.   NST: reassuring and appropriate for gestational age  [place]: quiet  Udip 2+ WBC  UA WBC 51, RBC, moderate bacteria       Pelvic pain:   Patient pelvic pain likely due to UTI. No evidence of pyelonephritis or  labor at this time. Will prescribe Macrobid 100 BID for 5 days and follow up urine culture results. Return to ED precautions given if UTI symptoms do not resolve, or if she develops, fevers/chills.   Patient stable for discharge at this time.     Nicol Bautista MD  Obstetrics & Gynecology, PGY-1        Amount and/or Complexity of Data Reviewed  Labs: ordered.              Attending Attestation:   Physician Attestation Statement for Resident:  As the supervising MD   Physician Attestation Statement: I have personally seen and examined this patient.   I agree with the above history.  -:   As the supervising MD I agree with the above PE.     As the supervising MD I agree  with the above treatment, course, plan, and disposition.   I was personally present during the critical portions of the procedure(s) performed by the resident and was immediately available in the ED to provide services and assistance as needed during the entire procedure.  I have reviewed and agree with the residents interpretation of the following: lab data.  I have reviewed the following: old records at this facility.            Attending ED Notes:   I saw and examined the patient myself along with the resident. I have personally reviewed pertinent prior records, labs, imaging, and procedures. I have reviewed the documentation and agree with the findings and plan as documented.      at 27w4d presenting with pelvic pain, no other complaints. UA suspicious for UTI, no evidence of pyelonephritis or  labor at this time. Will treat with Macrobid and follow up urine culture. NST reassuring and appropriate for gestational age. Discharge home in stable condition. Return precautions discussed.    Rosa Isela Zapien MD FACOG  OB Hospitalist                               Clinical Impression:  Final diagnoses:  [O23.42] Urinary tract infection affecting care of mother in second trimester, antepartum (Primary)  [Z3A.27] 27 weeks gestation of pregnancy          ED Disposition Condition    Discharge Stable          ED Prescriptions       Medication Sig Dispense Start Date End Date Auth. Provider    nitrofurantoin, macrocrystal-monohydrate, (MACROBID) 100 MG capsule Take 1 capsule (100 mg total) by mouth 2 (two) times daily. for 5 days 10 capsule 10/3/2024 10/8/2024 Nicol Bautista MD          Follow-up Information       Follow up With Specialties Details Why Contact Info    [facility] - Emergency Dept (Obstetrics) Emergency Medicine  If symptoms worsen [address] [facility] [address] [phone]    Noy Robles MD Obstetrics and Gynecology  As needed, As scheduled [address] [facility]  [address]  [facility] [address]  [phone]               Nicol Bautista MD  Resident  10/03/24 163       Rosa Isela Zapien MD  10/03/24 1726

## 2024-10-04 LAB — BACTERIA UR CULT: NORMAL

## 2024-10-06 ENCOUNTER — PATIENT MESSAGE (OUTPATIENT)
Dept: OTHER | Facility: OTHER | Age: 29
End: 2024-10-06
Payer: COMMERCIAL

## 2024-10-08 ENCOUNTER — CLINICAL SUPPORT (OUTPATIENT)
Dept: OBSTETRICS AND GYNECOLOGY | Facility: CLINIC | Age: 29
End: 2024-10-08
Payer: COMMERCIAL

## 2024-10-08 ENCOUNTER — ROUTINE PRENATAL (OUTPATIENT)
Dept: OBSTETRICS AND GYNECOLOGY | Facility: CLINIC | Age: 29
End: 2024-10-08
Payer: COMMERCIAL

## 2024-10-08 ENCOUNTER — PROCEDURE VISIT (OUTPATIENT)
Dept: MATERNAL FETAL MEDICINE | Facility: CLINIC | Age: 29
End: 2024-10-08
Payer: COMMERCIAL

## 2024-10-08 DIAGNOSIS — Z23 NEED FOR INFLUENZA VACCINATION: Primary | ICD-10-CM

## 2024-10-08 DIAGNOSIS — Z34.90 ENCOUNTER FOR INDUCTION OF LABOR: Primary | ICD-10-CM

## 2024-10-08 DIAGNOSIS — Z36.89 ENCOUNTER FOR ULTRASOUND TO ASSESS FETAL GROWTH: ICD-10-CM

## 2024-10-08 DIAGNOSIS — Z34.83 ENCOUNTER FOR SUPERVISION OF OTHER NORMAL PREGNANCY IN THIRD TRIMESTER: ICD-10-CM

## 2024-10-08 PROCEDURE — 99999 PR PBB SHADOW E&M-EST. PATIENT-LVL I: CPT | Mod: PBBFAC,,,

## 2024-10-08 PROCEDURE — 90471 IMMUNIZATION ADMIN: CPT | Mod: S$GLB,,, | Performed by: OBSTETRICS & GYNECOLOGY

## 2024-10-08 PROCEDURE — 90656 IIV3 VACC NO PRSV 0.5 ML IM: CPT | Mod: S$GLB,,, | Performed by: OBSTETRICS & GYNECOLOGY

## 2024-10-08 PROCEDURE — 0502F SUBSEQUENT PRENATAL CARE: CPT | Mod: CPTII,S$GLB,, | Performed by: OBSTETRICS & GYNECOLOGY

## 2024-10-08 PROCEDURE — 99999 PR PBB SHADOW E&M-EST. PATIENT-LVL I: CPT | Mod: PBBFAC,,, | Performed by: OBSTETRICS & GYNECOLOGY

## 2024-10-08 PROCEDURE — 76816 OB US FOLLOW-UP PER FETUS: CPT | Mod: S$GLB,,, | Performed by: OBSTETRICS & GYNECOLOGY

## 2024-10-08 NOTE — PROGRESS NOTES
Here for Influenza injection. Patient without complaint of pain at this time, injection given. Tolerated well no pain noted post injection advised to wait in lobby 15 minutes and report any adverse reactions.     Site:RD

## 2024-10-08 NOTE — PROGRESS NOTES
Pt doing well.  Denies any issues at this time.  Denies regular CTX, VB, VD, LOF.  + FM.  Continue PNV.  Labor precautions reviewed.  Pt to RTC in 4 weeks.  Would like induction before lore.  Will plan for induction on 12/23 @ 0000.  Flu vaccine today.  Did not get tdap due to allergy reaction to the vaccine last time.   Will start PNT at 32 weeks due to BMI.  Ordered placed

## 2024-10-09 ENCOUNTER — PATIENT MESSAGE (OUTPATIENT)
Dept: MATERNAL FETAL MEDICINE | Facility: CLINIC | Age: 29
End: 2024-10-09
Payer: COMMERCIAL

## 2024-10-10 ENCOUNTER — PATIENT MESSAGE (OUTPATIENT)
Dept: OBSTETRICS AND GYNECOLOGY | Facility: CLINIC | Age: 29
End: 2024-10-10
Payer: COMMERCIAL

## 2024-10-20 ENCOUNTER — PATIENT MESSAGE (OUTPATIENT)
Dept: OTHER | Facility: OTHER | Age: 29
End: 2024-10-20
Payer: COMMERCIAL

## 2024-10-29 ENCOUNTER — PATIENT MESSAGE (OUTPATIENT)
Dept: OBSTETRICS AND GYNECOLOGY | Facility: CLINIC | Age: 29
End: 2024-10-29
Payer: COMMERCIAL

## 2024-11-03 ENCOUNTER — PATIENT MESSAGE (OUTPATIENT)
Dept: OTHER | Facility: OTHER | Age: 29
End: 2024-11-03
Payer: COMMERCIAL

## 2024-11-04 ENCOUNTER — PROCEDURE VISIT (OUTPATIENT)
Dept: MATERNAL FETAL MEDICINE | Facility: CLINIC | Age: 29
End: 2024-11-04
Payer: COMMERCIAL

## 2024-11-04 DIAGNOSIS — Z34.83 ENCOUNTER FOR SUPERVISION OF OTHER NORMAL PREGNANCY IN THIRD TRIMESTER: ICD-10-CM

## 2024-11-04 PROCEDURE — 76816 OB US FOLLOW-UP PER FETUS: CPT | Mod: S$GLB,,, | Performed by: OBSTETRICS & GYNECOLOGY

## 2024-11-05 ENCOUNTER — ROUTINE PRENATAL (OUTPATIENT)
Dept: OBSTETRICS AND GYNECOLOGY | Facility: CLINIC | Age: 29
End: 2024-11-05
Payer: COMMERCIAL

## 2024-11-05 VITALS
DIASTOLIC BLOOD PRESSURE: 66 MMHG | BODY MASS INDEX: 43.62 KG/M2 | SYSTOLIC BLOOD PRESSURE: 102 MMHG | WEIGHT: 270.31 LBS

## 2024-11-05 DIAGNOSIS — Z34.83 ENCOUNTER FOR SUPERVISION OF OTHER NORMAL PREGNANCY IN THIRD TRIMESTER: Primary | ICD-10-CM

## 2024-11-05 PROCEDURE — 0502F SUBSEQUENT PRENATAL CARE: CPT | Mod: CPTII,S$GLB,, | Performed by: OBSTETRICS & GYNECOLOGY

## 2024-11-05 PROCEDURE — 99999 PR PBB SHADOW E&M-EST. PATIENT-LVL III: CPT | Mod: PBBFAC,,, | Performed by: OBSTETRICS & GYNECOLOGY

## 2024-11-05 RX ORDER — VALACYCLOVIR HYDROCHLORIDE 500 MG/1
500 TABLET, FILM COATED ORAL 2 TIMES DAILY
Qty: 60 TABLET | Refills: 11 | Status: SHIPPED | OUTPATIENT
Start: 2024-11-05 | End: 2025-11-05

## 2024-11-05 NOTE — PROGRESS NOTES
Pt is having contractions intermittently for the past couple weeks.  Has been happening most often when at work.  Did have loss of some mucus yesterday.  No bleeding.Cervix checked and fingertip.  Denies regular CTX, VB, VD, LOF.  + FM.  Continue PNV.  Labor precautions reviewed.  Pt to RTC in 2-3 week.   Heartburn improved with prilosec.  Will continue at this time.  Will start on valtrex.  Rx sent. Has 32 week growth US.  GBS at next visit. PNT for BMI

## 2024-11-08 ENCOUNTER — TELEPHONE (OUTPATIENT)
Dept: OBSTETRICS AND GYNECOLOGY | Facility: OTHER | Age: 29
End: 2024-11-08
Payer: COMMERCIAL

## 2024-11-12 ENCOUNTER — HOSPITAL ENCOUNTER (OUTPATIENT)
Dept: PERINATAL CARE | Facility: OTHER | Age: 29
Discharge: HOME | End: 2024-11-12
Attending: OBSTETRICS & GYNECOLOGY
Payer: COMMERCIAL

## 2024-11-12 PROCEDURE — 59025 FETAL NON-STRESS TEST: CPT

## 2024-11-12 PROCEDURE — 59025 FETAL NON-STRESS TEST: CPT | Mod: 26,,, | Performed by: OBSTETRICS & GYNECOLOGY

## 2024-11-12 PROCEDURE — 76815 OB US LIMITED FETUS(S): CPT

## 2024-11-12 PROCEDURE — 76815 OB US LIMITED FETUS(S): CPT | Mod: 26,,, | Performed by: OBSTETRICS & GYNECOLOGY

## 2024-11-18 NOTE — PROGRESS NOTES
Complaints today:Ms. Garcia reports that she is feeling better. She was experiencing URI symptoms. She reports that her appetite is improving. She denies vaginal bleeding and vaginal discharge.    /60   Wt 121 kg (266 lb 12.1 oz)   LMP 2024   BMI 43.06 kg/m²     29 y.o., at 19w4d by Estimated Date of Delivery: 24  Patient Active Problem List   Diagnosis    KRIS (iron deficiency anemia)    Functional diarrhea    Hyperemesis    Positive GBS test    BMI 45.0-49.9, adult    HSV-2 (herpes simplex virus 2) infection    Seasonal allergic rhinitis    Amenorrhea    Supervision of other normal pregnancy, antepartum    Urinary tract infection affecting care of mother in second trimester, antepartum     OB History    Para Term  AB Living   2 1 1     1   SAB IAB Ectopic Multiple Live Births         0 1      # Outcome Date GA Lbr Atif/2nd Weight Sex Type Anes PTL Lv   2 Current            1 Term 20 39w5d  2.863 kg (6 lb 5 oz) M Vag-Spont EPI N LATOYA       Dating reviewed    Allergies and problem list reviewed and updated    Medical and surgical history reviewed    Prenatal labs reviewed and updated    Physical Exam:  ABD: soft, gravid, nontender, 20 cm    Assessment:  Frankie was seen today for routine prenatal visit.    Diagnoses and all orders for this visit:    Supervision of other normal pregnancy, antepartum  - MFM US scheduled for   - Doing well      No orders of the defined types were placed in this encounter.      Follow up in about 4 weeks (around 2024) for Routine OB.

## 2024-11-19 ENCOUNTER — HOSPITAL ENCOUNTER (OUTPATIENT)
Dept: PERINATAL CARE | Facility: OTHER | Age: 29
Discharge: HOME | End: 2024-11-19
Attending: OBSTETRICS & GYNECOLOGY
Payer: COMMERCIAL

## 2024-11-19 PROCEDURE — 76815 OB US LIMITED FETUS(S): CPT | Mod: 26,,, | Performed by: STUDENT IN AN ORGANIZED HEALTH CARE EDUCATION/TRAINING PROGRAM

## 2024-11-19 PROCEDURE — 59025 FETAL NON-STRESS TEST: CPT | Mod: 26,,, | Performed by: STUDENT IN AN ORGANIZED HEALTH CARE EDUCATION/TRAINING PROGRAM

## 2024-11-19 PROCEDURE — 59025 FETAL NON-STRESS TEST: CPT

## 2024-11-19 PROCEDURE — 76815 OB US LIMITED FETUS(S): CPT

## 2024-11-24 ENCOUNTER — PATIENT MESSAGE (OUTPATIENT)
Dept: OTHER | Facility: OTHER | Age: 29
End: 2024-11-24
Payer: COMMERCIAL

## 2024-11-26 ENCOUNTER — HOSPITAL ENCOUNTER (OUTPATIENT)
Dept: PERINATAL CARE | Facility: OTHER | Age: 29
Discharge: HOME | End: 2024-11-26
Attending: OBSTETRICS & GYNECOLOGY
Payer: COMMERCIAL

## 2024-11-26 ENCOUNTER — ROUTINE PRENATAL (OUTPATIENT)
Dept: OBSTETRICS AND GYNECOLOGY | Facility: CLINIC | Age: 29
End: 2024-11-26
Payer: COMMERCIAL

## 2024-11-26 VITALS
DIASTOLIC BLOOD PRESSURE: 74 MMHG | SYSTOLIC BLOOD PRESSURE: 110 MMHG | BODY MASS INDEX: 45.23 KG/M2 | WEIGHT: 280.19 LBS

## 2024-11-26 DIAGNOSIS — B00.9 HSV-2 (HERPES SIMPLEX VIRUS 2) INFECTION: ICD-10-CM

## 2024-11-26 DIAGNOSIS — Z34.83 ENCOUNTER FOR SUPERVISION OF OTHER NORMAL PREGNANCY IN THIRD TRIMESTER: Primary | ICD-10-CM

## 2024-11-26 PROBLEM — Z34.93 ENCOUNTER FOR SUPERVISION OF NORMAL PREGNANCY IN THIRD TRIMESTER: Status: ACTIVE | Noted: 2024-06-13

## 2024-11-26 PROCEDURE — 59025 FETAL NON-STRESS TEST: CPT | Mod: 26,,, | Performed by: OBSTETRICS & GYNECOLOGY

## 2024-11-26 PROCEDURE — 59025 FETAL NON-STRESS TEST: CPT

## 2024-11-26 PROCEDURE — 87653 STREP B DNA AMP PROBE: CPT | Performed by: OBSTETRICS & GYNECOLOGY

## 2024-11-26 PROCEDURE — 76815 OB US LIMITED FETUS(S): CPT

## 2024-11-26 PROCEDURE — 99999 PR PBB SHADOW E&M-EST. PATIENT-LVL II: CPT | Mod: PBBFAC,,, | Performed by: OBSTETRICS & GYNECOLOGY

## 2024-11-26 PROCEDURE — 0502F SUBSEQUENT PRENATAL CARE: CPT | Mod: CPTII,S$GLB,, | Performed by: OBSTETRICS & GYNECOLOGY

## 2024-11-26 PROCEDURE — 76815 OB US LIMITED FETUS(S): CPT | Mod: 26,,, | Performed by: OBSTETRICS & GYNECOLOGY

## 2024-11-26 NOTE — PROGRESS NOTES
Pt doing well.  Denies any issues at this time.  Denies regular CTX, VB, VD, LOF.  + FM.  Continue PNV.  Labor precautions reviewed.  Pt to RTC in 1 week.  Taking valtrex daily for hx of HSV.  Has PNT today.  GBS today.  Induction requested for 12/23 @ 0000.  3rd trim labs ordered.

## 2024-11-28 LAB — GROUP B STREPTOCOCCUS, PCR: POSITIVE

## 2024-12-03 ENCOUNTER — ROUTINE PRENATAL (OUTPATIENT)
Dept: OBSTETRICS AND GYNECOLOGY | Facility: CLINIC | Age: 29
End: 2024-12-03
Payer: COMMERCIAL

## 2024-12-03 ENCOUNTER — HOSPITAL ENCOUNTER (OUTPATIENT)
Dept: PERINATAL CARE | Facility: OTHER | Age: 29
Discharge: HOME | End: 2024-12-03
Attending: OBSTETRICS & GYNECOLOGY
Payer: COMMERCIAL

## 2024-12-03 VITALS — BODY MASS INDEX: 45.08 KG/M2 | WEIGHT: 279.31 LBS | DIASTOLIC BLOOD PRESSURE: 64 MMHG | SYSTOLIC BLOOD PRESSURE: 98 MMHG

## 2024-12-03 DIAGNOSIS — Z34.83 ENCOUNTER FOR SUPERVISION OF OTHER NORMAL PREGNANCY IN THIRD TRIMESTER: Primary | ICD-10-CM

## 2024-12-03 DIAGNOSIS — B00.9 HSV-2 (HERPES SIMPLEX VIRUS 2) INFECTION: ICD-10-CM

## 2024-12-03 PROCEDURE — 0502F SUBSEQUENT PRENATAL CARE: CPT | Mod: CPTII,S$GLB,, | Performed by: OBSTETRICS & GYNECOLOGY

## 2024-12-03 PROCEDURE — 76815 OB US LIMITED FETUS(S): CPT

## 2024-12-03 PROCEDURE — 99999 PR PBB SHADOW E&M-EST. PATIENT-LVL II: CPT | Mod: PBBFAC,,, | Performed by: OBSTETRICS & GYNECOLOGY

## 2024-12-03 PROCEDURE — 59025 FETAL NON-STRESS TEST: CPT

## 2024-12-03 NOTE — PROGRESS NOTES
Pt doing well.  Denies any issues at this time.  Denies regular CTX, VB, VD, LOF.  + FM.  Continue PNV.  Labor precautions reviewed.  Pt to RTC in 1 week.  PNT doing well. Induction scheduled for 12/23 @ 0000.  Discussed induction in detail with patient.  All questions answered.  Pt taking valtrex for HSV + history.  No lesions.

## 2024-12-10 ENCOUNTER — ROUTINE PRENATAL (OUTPATIENT)
Dept: OBSTETRICS AND GYNECOLOGY | Facility: CLINIC | Age: 29
End: 2024-12-10
Payer: COMMERCIAL

## 2024-12-10 ENCOUNTER — HOSPITAL ENCOUNTER (OUTPATIENT)
Dept: PERINATAL CARE | Facility: OTHER | Age: 29
Discharge: HOME | End: 2024-12-10
Attending: OBSTETRICS & GYNECOLOGY
Payer: COMMERCIAL

## 2024-12-10 VITALS
BODY MASS INDEX: 44.27 KG/M2 | SYSTOLIC BLOOD PRESSURE: 116 MMHG | WEIGHT: 274.25 LBS | DIASTOLIC BLOOD PRESSURE: 70 MMHG

## 2024-12-10 DIAGNOSIS — Z34.83 ENCOUNTER FOR SUPERVISION OF OTHER NORMAL PREGNANCY IN THIRD TRIMESTER: Primary | ICD-10-CM

## 2024-12-10 DIAGNOSIS — B00.9 HSV-2 (HERPES SIMPLEX VIRUS 2) INFECTION: ICD-10-CM

## 2024-12-10 PROCEDURE — 99999 PR PBB SHADOW E&M-EST. PATIENT-LVL II: CPT | Mod: PBBFAC,,, | Performed by: OBSTETRICS & GYNECOLOGY

## 2024-12-10 PROCEDURE — 0502F SUBSEQUENT PRENATAL CARE: CPT | Mod: CPTII,S$GLB,, | Performed by: OBSTETRICS & GYNECOLOGY

## 2024-12-10 PROCEDURE — 59025 FETAL NON-STRESS TEST: CPT | Mod: 26,,, | Performed by: OBSTETRICS & GYNECOLOGY

## 2024-12-10 PROCEDURE — 59025 FETAL NON-STRESS TEST: CPT

## 2024-12-10 PROCEDURE — 76815 OB US LIMITED FETUS(S): CPT

## 2024-12-10 PROCEDURE — 76815 OB US LIMITED FETUS(S): CPT | Mod: 26,,, | Performed by: OBSTETRICS & GYNECOLOGY

## 2024-12-10 NOTE — PROGRESS NOTES
Pt doing well.  Denies any issues at this time.  Denies regular CTX, VB, VD, LOF.  + FM.  Continue PNV.  Labor precautions reviewed.  Pt to RTC in 1 week.  Induction set for 12/23 @ 0000.  If one of the 4pm inductions delivers, will move to 12/22 @ 1600.  Plan for cytotec and possibly balloon placement.  Pt is concerned about pain with balloon placement. Will decide day of if wants balloon.  Taking valtrex daily.

## 2024-12-17 ENCOUNTER — HOSPITAL ENCOUNTER (OUTPATIENT)
Dept: PERINATAL CARE | Facility: OTHER | Age: 29
Discharge: HOME | End: 2024-12-17
Attending: OBSTETRICS & GYNECOLOGY
Payer: COMMERCIAL

## 2024-12-17 ENCOUNTER — ROUTINE PRENATAL (OUTPATIENT)
Dept: OBSTETRICS AND GYNECOLOGY | Facility: CLINIC | Age: 29
End: 2024-12-17
Payer: COMMERCIAL

## 2024-12-17 VITALS
WEIGHT: 279.75 LBS | BODY MASS INDEX: 45.16 KG/M2 | DIASTOLIC BLOOD PRESSURE: 58 MMHG | SYSTOLIC BLOOD PRESSURE: 110 MMHG

## 2024-12-17 DIAGNOSIS — B00.9 HSV-2 (HERPES SIMPLEX VIRUS 2) INFECTION: ICD-10-CM

## 2024-12-17 DIAGNOSIS — Z3A.38 38 WEEKS GESTATION OF PREGNANCY: Primary | ICD-10-CM

## 2024-12-17 DIAGNOSIS — Z34.83 ENCOUNTER FOR SUPERVISION OF OTHER NORMAL PREGNANCY IN THIRD TRIMESTER: ICD-10-CM

## 2024-12-17 PROCEDURE — 59025 FETAL NON-STRESS TEST: CPT

## 2024-12-17 PROCEDURE — 59025 FETAL NON-STRESS TEST: CPT | Mod: 26,,, | Performed by: OBSTETRICS & GYNECOLOGY

## 2024-12-17 PROCEDURE — 76815 OB US LIMITED FETUS(S): CPT

## 2024-12-17 PROCEDURE — 76815 OB US LIMITED FETUS(S): CPT | Mod: 26,,, | Performed by: OBSTETRICS & GYNECOLOGY

## 2024-12-17 PROCEDURE — 99999 PR PBB SHADOW E&M-EST. PATIENT-LVL III: CPT | Mod: PBBFAC,,,

## 2024-12-18 NOTE — PROGRESS NOTES
Here for routine OB appt at 38w3d, with no complaints.  Reports good FM.  Denies LOF, denies VB, denies contractions.  Reviewed warning signs of Labor and Preeclampsia.  Daily FM counts reinforced.    SVE 1.5/70/-3, head feels engaged & low; cervix is thin & soft    IOL 12/23 @ MN

## 2024-12-22 ENCOUNTER — PATIENT MESSAGE (OUTPATIENT)
Dept: OBSTETRICS AND GYNECOLOGY | Facility: OTHER | Age: 29
End: 2024-12-22
Payer: COMMERCIAL

## 2024-12-23 ENCOUNTER — HOSPITAL ENCOUNTER (INPATIENT)
Facility: OTHER | Age: 29
LOS: 2 days | Discharge: HOME | End: 2024-12-25
Attending: OBSTETRICS & GYNECOLOGY | Admitting: STUDENT IN AN ORGANIZED HEALTH CARE EDUCATION/TRAINING PROGRAM
Payer: COMMERCIAL

## 2024-12-23 ENCOUNTER — ANESTHESIA (OUTPATIENT)
Dept: OBSTETRICS AND GYNECOLOGY | Facility: OTHER | Age: 29
End: 2024-12-23
Payer: COMMERCIAL

## 2024-12-23 ENCOUNTER — ANESTHESIA EVENT (OUTPATIENT)
Dept: OBSTETRICS AND GYNECOLOGY | Facility: OTHER | Age: 29
End: 2024-12-23
Payer: COMMERCIAL

## 2024-12-23 DIAGNOSIS — Z34.90 ENCOUNTER FOR INDUCTION OF LABOR: ICD-10-CM

## 2024-12-23 LAB
ABO + RH BLD: NORMAL
BASOPHILS # BLD AUTO: 0.02 K/UL (ref 0–0.2)
BASOPHILS NFR BLD: 0.2 % (ref 0–1.9)
BLD GP AB SCN CELLS X3 SERPL QL: NORMAL
DIFFERENTIAL METHOD BLD: ABNORMAL
EOSINOPHIL # BLD AUTO: 0.1 K/UL (ref 0–0.5)
EOSINOPHIL NFR BLD: 0.9 % (ref 0–8)
ERYTHROCYTE [DISTWIDTH] IN BLOOD BY AUTOMATED COUNT: 13.1 % (ref 11.5–14.5)
HCT VFR BLD AUTO: 31 % (ref 37–48.5)
HGB BLD-MCNC: 11.1 G/DL (ref 12–16)
IMM GRANULOCYTES # BLD AUTO: 0.02 K/UL (ref 0–0.04)
IMM GRANULOCYTES NFR BLD AUTO: 0.2 % (ref 0–0.5)
LYMPHOCYTES # BLD AUTO: 2.2 K/UL (ref 1–4.8)
LYMPHOCYTES NFR BLD: 25.1 % (ref 18–48)
MCH RBC QN AUTO: 31.4 PG (ref 27–31)
MCHC RBC AUTO-ENTMCNC: 35.8 G/DL (ref 32–36)
MCV RBC AUTO: 88 FL (ref 82–98)
MONOCYTES # BLD AUTO: 0.7 K/UL (ref 0.3–1)
MONOCYTES NFR BLD: 8.3 % (ref 4–15)
NEUTROPHILS # BLD AUTO: 5.6 K/UL (ref 1.8–7.7)
NEUTROPHILS NFR BLD: 65.3 % (ref 38–73)
NRBC BLD-RTO: 0 /100 WBC
PLATELET # BLD AUTO: 183 K/UL (ref 150–450)
PMV BLD AUTO: 9.9 FL (ref 9.2–12.9)
RBC # BLD AUTO: 3.53 M/UL (ref 4–5.4)
TREPONEMA PALLIDUM IGG+IGM AB [PRESENCE] IN SERUM OR PLASMA BY IMMUNOASSAY: NONREACTIVE
WBC # BLD AUTO: 8.56 K/UL (ref 3.9–12.7)

## 2024-12-23 PROCEDURE — 63600175 PHARM REV CODE 636 W HCPCS: Mod: JZ,JG

## 2024-12-23 PROCEDURE — 25000003 PHARM REV CODE 250

## 2024-12-23 PROCEDURE — 85025 COMPLETE CBC W/AUTO DIFF WBC: CPT

## 2024-12-23 PROCEDURE — 72100002 HC LABOR CARE, 1ST 8 HOURS

## 2024-12-23 PROCEDURE — 62326 NJX INTERLAMINAR LMBR/SAC: CPT

## 2024-12-23 PROCEDURE — 72100003 HC LABOR CARE, EA. ADDL. 8 HRS

## 2024-12-23 PROCEDURE — 27200710 HC EPIDURAL INFUSION PUMP SET: Performed by: STUDENT IN AN ORGANIZED HEALTH CARE EDUCATION/TRAINING PROGRAM

## 2024-12-23 PROCEDURE — 63600175 PHARM REV CODE 636 W HCPCS

## 2024-12-23 PROCEDURE — 3E033VJ INTRODUCTION OF OTHER HORMONE INTO PERIPHERAL VEIN, PERCUTANEOUS APPROACH: ICD-10-PCS | Performed by: OBSTETRICS & GYNECOLOGY

## 2024-12-23 PROCEDURE — 11000001 HC ACUTE MED/SURG PRIVATE ROOM

## 2024-12-23 PROCEDURE — C1751 CATH, INF, PER/CENT/MIDLINE: HCPCS | Performed by: STUDENT IN AN ORGANIZED HEALTH CARE EDUCATION/TRAINING PROGRAM

## 2024-12-23 PROCEDURE — 10907ZC DRAINAGE OF AMNIOTIC FLUID, THERAPEUTIC FROM PRODUCTS OF CONCEPTION, VIA NATURAL OR ARTIFICIAL OPENING: ICD-10-PCS | Performed by: OBSTETRICS & GYNECOLOGY

## 2024-12-23 PROCEDURE — 0HQ9XZZ REPAIR PERINEUM SKIN, EXTERNAL APPROACH: ICD-10-PCS | Performed by: OBSTETRICS & GYNECOLOGY

## 2024-12-23 PROCEDURE — 86593 SYPHILIS TEST NON-TREP QUANT: CPT

## 2024-12-23 PROCEDURE — 51702 INSERT TEMP BLADDER CATH: CPT

## 2024-12-23 PROCEDURE — 86901 BLOOD TYPING SEROLOGIC RH(D): CPT

## 2024-12-23 PROCEDURE — 72200005 HC VAGINAL DELIVERY LEVEL II

## 2024-12-23 RX ORDER — SODIUM CHLORIDE, SODIUM LACTATE, POTASSIUM CHLORIDE, CALCIUM CHLORIDE 600; 310; 30; 20 MG/100ML; MG/100ML; MG/100ML; MG/100ML
INJECTION, SOLUTION INTRAVENOUS CONTINUOUS
Status: DISCONTINUED | OUTPATIENT
Start: 2024-12-23 | End: 2024-12-23

## 2024-12-23 RX ORDER — METHYLERGONOVINE MALEATE 0.2 MG/ML
200 INJECTION INTRAVENOUS ONCE AS NEEDED
Status: DISCONTINUED | OUTPATIENT
Start: 2024-12-23 | End: 2024-12-25 | Stop reason: HOSPADM

## 2024-12-23 RX ORDER — OXYTOCIN-SODIUM CHLORIDE 0.9% IV SOLN 30 UNIT/500ML 30-0.9/5 UT/ML-%
95 SOLUTION INTRAVENOUS ONCE AS NEEDED
Status: DISCONTINUED | OUTPATIENT
Start: 2024-12-23 | End: 2024-12-25 | Stop reason: HOSPADM

## 2024-12-23 RX ORDER — OXYTOCIN-SODIUM CHLORIDE 0.9% IV SOLN 30 UNIT/500ML 30-0.9/5 UT/ML-%
0-32 SOLUTION INTRAVENOUS CONTINUOUS
Status: DISCONTINUED | OUTPATIENT
Start: 2024-12-23 | End: 2024-12-23

## 2024-12-23 RX ORDER — FENTANYL CITRATE 50 UG/ML
INJECTION, SOLUTION INTRAMUSCULAR; INTRAVENOUS
Status: COMPLETED
Start: 2024-12-23 | End: 2024-12-23

## 2024-12-23 RX ORDER — ACETAMINOPHEN 325 MG/1
650 TABLET ORAL EVERY 6 HOURS PRN
Status: DISCONTINUED | OUTPATIENT
Start: 2024-12-23 | End: 2024-12-25 | Stop reason: HOSPADM

## 2024-12-23 RX ORDER — ONDANSETRON 8 MG/1
8 TABLET, ORALLY DISINTEGRATING ORAL EVERY 8 HOURS PRN
Status: DISCONTINUED | OUTPATIENT
Start: 2024-12-23 | End: 2024-12-23

## 2024-12-23 RX ORDER — SIMETHICONE 80 MG
1 TABLET,CHEWABLE ORAL 4 TIMES DAILY PRN
Status: DISCONTINUED | OUTPATIENT
Start: 2024-12-23 | End: 2024-12-23

## 2024-12-23 RX ORDER — CARBOPROST TROMETHAMINE 250 UG/ML
250 INJECTION, SOLUTION INTRAMUSCULAR
Status: DISCONTINUED | OUTPATIENT
Start: 2024-12-23 | End: 2024-12-23

## 2024-12-23 RX ORDER — SIMETHICONE 80 MG
1 TABLET,CHEWABLE ORAL EVERY 6 HOURS PRN
Status: DISCONTINUED | OUTPATIENT
Start: 2024-12-23 | End: 2024-12-25 | Stop reason: HOSPADM

## 2024-12-23 RX ORDER — FENTANYL/BUPIVACAINE/NS/PF 2MCG/ML-.1
PLASTIC BAG, INJECTION (ML) INJECTION CONTINUOUS PRN
Status: DISCONTINUED | OUTPATIENT
Start: 2024-12-23 | End: 2024-12-23

## 2024-12-23 RX ORDER — CARBOPROST TROMETHAMINE 250 UG/ML
250 INJECTION, SOLUTION INTRAMUSCULAR
Status: DISCONTINUED | OUTPATIENT
Start: 2024-12-23 | End: 2024-12-25 | Stop reason: HOSPADM

## 2024-12-23 RX ORDER — HYDROCORTISONE 25 MG/G
CREAM TOPICAL 3 TIMES DAILY PRN
Status: DISCONTINUED | OUTPATIENT
Start: 2024-12-23 | End: 2024-12-25 | Stop reason: HOSPADM

## 2024-12-23 RX ORDER — FENTANYL CITRATE 50 UG/ML
INJECTION, SOLUTION INTRAMUSCULAR; INTRAVENOUS
Status: DISCONTINUED | OUTPATIENT
Start: 2024-12-23 | End: 2024-12-23

## 2024-12-23 RX ORDER — CALCIUM CARBONATE 200(500)MG
500 TABLET,CHEWABLE ORAL 3 TIMES DAILY PRN
Status: DISCONTINUED | OUTPATIENT
Start: 2024-12-23 | End: 2024-12-23

## 2024-12-23 RX ORDER — CEFAZOLIN 2 G/1
2 INJECTION, POWDER, FOR SOLUTION INTRAMUSCULAR; INTRAVENOUS ONCE AS NEEDED
Status: DISCONTINUED | OUTPATIENT
Start: 2024-12-23 | End: 2024-12-23

## 2024-12-23 RX ORDER — FAMOTIDINE 10 MG/ML
20 INJECTION INTRAVENOUS ONCE
Status: DISCONTINUED | OUTPATIENT
Start: 2024-12-23 | End: 2024-12-23

## 2024-12-23 RX ORDER — OXYTOCIN-SODIUM CHLORIDE 0.9% IV SOLN 30 UNIT/500ML 30-0.9/5 UT/ML-%
95 SOLUTION INTRAVENOUS CONTINUOUS PRN
Status: DISCONTINUED | OUTPATIENT
Start: 2024-12-23 | End: 2024-12-25 | Stop reason: HOSPADM

## 2024-12-23 RX ORDER — OXYTOCIN 10 [USP'U]/ML
10 INJECTION, SOLUTION INTRAMUSCULAR; INTRAVENOUS ONCE AS NEEDED
Status: DISCONTINUED | OUTPATIENT
Start: 2024-12-23 | End: 2024-12-23

## 2024-12-23 RX ORDER — IBUPROFEN 600 MG/1
600 TABLET ORAL EVERY 6 HOURS
Status: DISCONTINUED | OUTPATIENT
Start: 2024-12-23 | End: 2024-12-25 | Stop reason: HOSPADM

## 2024-12-23 RX ORDER — OXYCODONE HYDROCHLORIDE 5 MG/1
5 TABLET ORAL EVERY 4 HOURS PRN
Status: DISCONTINUED | OUTPATIENT
Start: 2024-12-23 | End: 2024-12-25 | Stop reason: HOSPADM

## 2024-12-23 RX ORDER — METHOCARBAMOL 500 MG/1
500 TABLET, FILM COATED ORAL 3 TIMES DAILY
Status: DISCONTINUED | OUTPATIENT
Start: 2024-12-23 | End: 2024-12-25 | Stop reason: HOSPADM

## 2024-12-23 RX ORDER — FENTANYL/BUPIVACAINE/NS/PF 2MCG/ML-.1
PLASTIC BAG, INJECTION (ML) INJECTION CONTINUOUS
Status: DISCONTINUED | OUTPATIENT
Start: 2024-12-23 | End: 2024-12-23

## 2024-12-23 RX ORDER — BUPIVACAINE HYDROCHLORIDE 2.5 MG/ML
INJECTION, SOLUTION EPIDURAL; INFILTRATION; INTRACAUDAL
Status: DISCONTINUED | OUTPATIENT
Start: 2024-12-23 | End: 2024-12-23

## 2024-12-23 RX ORDER — BUPIVACAINE HYDROCHLORIDE 2.5 MG/ML
INJECTION, SOLUTION EPIDURAL; INFILTRATION; INTRACAUDAL
Status: DISPENSED
Start: 2024-12-23 | End: 2024-12-24

## 2024-12-23 RX ORDER — SODIUM CITRATE AND CITRIC ACID MONOHYDRATE 334; 500 MG/5ML; MG/5ML
30 SOLUTION ORAL ONCE
Status: DISCONTINUED | OUTPATIENT
Start: 2024-12-23 | End: 2024-12-23

## 2024-12-23 RX ORDER — OXYTOCIN 10 [USP'U]/ML
10 INJECTION, SOLUTION INTRAMUSCULAR; INTRAVENOUS ONCE AS NEEDED
Status: DISCONTINUED | OUTPATIENT
Start: 2024-12-23 | End: 2024-12-25 | Stop reason: HOSPADM

## 2024-12-23 RX ORDER — BUTALBITAL, ACETAMINOPHEN AND CAFFEINE 50; 325; 40 MG/1; MG/1; MG/1
1 TABLET ORAL EVERY 4 HOURS PRN
Status: DISCONTINUED | OUTPATIENT
Start: 2024-12-24 | End: 2024-12-25 | Stop reason: HOSPADM

## 2024-12-23 RX ORDER — DIPHENOXYLATE HYDROCHLORIDE AND ATROPINE SULFATE 2.5; .025 MG/1; MG/1
2 TABLET ORAL EVERY 6 HOURS PRN
Status: DISCONTINUED | OUTPATIENT
Start: 2024-12-23 | End: 2024-12-25 | Stop reason: HOSPADM

## 2024-12-23 RX ORDER — DIPHENHYDRAMINE HCL 25 MG
25 CAPSULE ORAL EVERY 4 HOURS PRN
Status: DISCONTINUED | OUTPATIENT
Start: 2024-12-23 | End: 2024-12-25 | Stop reason: HOSPADM

## 2024-12-23 RX ORDER — LIDOCAINE HYDROCHLORIDE 10 MG/ML
10 INJECTION, SOLUTION INFILTRATION; PERINEURAL ONCE AS NEEDED
Status: DISCONTINUED | OUTPATIENT
Start: 2024-12-23 | End: 2024-12-23

## 2024-12-23 RX ORDER — OXYTOCIN-SODIUM CHLORIDE 0.9% IV SOLN 30 UNIT/500ML 30-0.9/5 UT/ML-%
10 SOLUTION INTRAVENOUS ONCE AS NEEDED
Status: COMPLETED | OUTPATIENT
Start: 2024-12-23 | End: 2024-12-23

## 2024-12-23 RX ORDER — DOCUSATE SODIUM 100 MG/1
200 CAPSULE, LIQUID FILLED ORAL 2 TIMES DAILY PRN
Status: DISCONTINUED | OUTPATIENT
Start: 2024-12-23 | End: 2024-12-25 | Stop reason: HOSPADM

## 2024-12-23 RX ORDER — OXYTOCIN-SODIUM CHLORIDE 0.9% IV SOLN 30 UNIT/500ML 30-0.9/5 UT/ML-%
10 SOLUTION INTRAVENOUS ONCE AS NEEDED
Status: DISCONTINUED | OUTPATIENT
Start: 2024-12-23 | End: 2024-12-23

## 2024-12-23 RX ORDER — DIPHENOXYLATE HYDROCHLORIDE AND ATROPINE SULFATE 2.5; .025 MG/1; MG/1
2 TABLET ORAL EVERY 6 HOURS PRN
Status: DISCONTINUED | OUTPATIENT
Start: 2024-12-23 | End: 2024-12-23

## 2024-12-23 RX ORDER — OXYTOCIN-SODIUM CHLORIDE 0.9% IV SOLN 30 UNIT/500ML 30-0.9/5 UT/ML-%
10 SOLUTION INTRAVENOUS ONCE AS NEEDED
Status: DISCONTINUED | OUTPATIENT
Start: 2024-12-23 | End: 2024-12-25 | Stop reason: HOSPADM

## 2024-12-23 RX ORDER — DIPHENHYDRAMINE HYDROCHLORIDE 50 MG/ML
25 INJECTION INTRAMUSCULAR; INTRAVENOUS EVERY 4 HOURS PRN
Status: DISCONTINUED | OUTPATIENT
Start: 2024-12-23 | End: 2024-12-25 | Stop reason: HOSPADM

## 2024-12-23 RX ORDER — OXYTOCIN-SODIUM CHLORIDE 0.9% IV SOLN 30 UNIT/500ML 30-0.9/5 UT/ML-%
95 SOLUTION INTRAVENOUS ONCE AS NEEDED
Status: DISCONTINUED | OUTPATIENT
Start: 2024-12-23 | End: 2024-12-23

## 2024-12-23 RX ORDER — MISOPROSTOL 200 UG/1
800 TABLET ORAL ONCE AS NEEDED
Status: DISCONTINUED | OUTPATIENT
Start: 2024-12-23 | End: 2024-12-25 | Stop reason: HOSPADM

## 2024-12-23 RX ORDER — FENTANYL/BUPIVACAINE/NS/PF 2MCG/ML-.1
PLASTIC BAG, INJECTION (ML) INJECTION
Status: COMPLETED
Start: 2024-12-23 | End: 2024-12-23

## 2024-12-23 RX ORDER — PRENATAL WITH FERROUS FUM AND FOLIC ACID 3080; 920; 120; 400; 22; 1.84; 3; 20; 10; 1; 12; 200; 27; 25; 2 [IU]/1; [IU]/1; MG/1; [IU]/1; MG/1; MG/1; MG/1; MG/1; MG/1; MG/1; UG/1; MG/1; MG/1; MG/1; MG/1
1 TABLET ORAL DAILY
Status: DISCONTINUED | OUTPATIENT
Start: 2024-12-24 | End: 2024-12-25 | Stop reason: HOSPADM

## 2024-12-23 RX ORDER — ONDANSETRON 8 MG/1
8 TABLET, ORALLY DISINTEGRATING ORAL EVERY 8 HOURS PRN
Status: DISCONTINUED | OUTPATIENT
Start: 2024-12-23 | End: 2024-12-25 | Stop reason: HOSPADM

## 2024-12-23 RX ORDER — METHOCARBAMOL 500 MG/1
500 TABLET, FILM COATED ORAL 3 TIMES DAILY
Status: DISCONTINUED | OUTPATIENT
Start: 2024-12-24 | End: 2024-12-23

## 2024-12-23 RX ORDER — SODIUM CHLORIDE 0.9 % (FLUSH) 0.9 %
10 SYRINGE (ML) INJECTION
Status: DISCONTINUED | OUTPATIENT
Start: 2024-12-23 | End: 2024-12-25 | Stop reason: HOSPADM

## 2024-12-23 RX ORDER — TRANEXAMIC ACID 10 MG/ML
1000 INJECTION, SOLUTION INTRAVENOUS EVERY 30 MIN PRN
Status: DISCONTINUED | OUTPATIENT
Start: 2024-12-23 | End: 2024-12-25 | Stop reason: HOSPADM

## 2024-12-23 RX ORDER — SODIUM CHLORIDE 9 MG/ML
INJECTION, SOLUTION INTRAVENOUS
Status: DISCONTINUED | OUTPATIENT
Start: 2024-12-23 | End: 2024-12-23

## 2024-12-23 RX ORDER — MISOPROSTOL 200 UG/1
800 TABLET ORAL ONCE AS NEEDED
Status: DISCONTINUED | OUTPATIENT
Start: 2024-12-23 | End: 2024-12-23

## 2024-12-23 RX ORDER — OXYCODONE HYDROCHLORIDE 10 MG/1
10 TABLET ORAL EVERY 4 HOURS PRN
Status: DISCONTINUED | OUTPATIENT
Start: 2024-12-23 | End: 2024-12-25 | Stop reason: HOSPADM

## 2024-12-23 RX ORDER — TRANEXAMIC ACID 10 MG/ML
1000 INJECTION, SOLUTION INTRAVENOUS EVERY 30 MIN PRN
Status: DISCONTINUED | OUTPATIENT
Start: 2024-12-23 | End: 2024-12-23

## 2024-12-23 RX ORDER — OXYTOCIN-SODIUM CHLORIDE 0.9% IV SOLN 30 UNIT/500ML 30-0.9/5 UT/ML-%
95 SOLUTION INTRAVENOUS CONTINUOUS PRN
Status: DISCONTINUED | OUTPATIENT
Start: 2024-12-23 | End: 2024-12-23

## 2024-12-23 RX ORDER — BUPIVACAINE HYDROCHLORIDE 2.5 MG/ML
INJECTION, SOLUTION EPIDURAL; INFILTRATION; INTRACAUDAL
Status: COMPLETED
Start: 2024-12-23 | End: 2024-12-23

## 2024-12-23 RX ORDER — METHYLERGONOVINE MALEATE 0.2 MG/ML
200 INJECTION INTRAVENOUS ONCE AS NEEDED
Status: DISCONTINUED | OUTPATIENT
Start: 2024-12-23 | End: 2024-12-23

## 2024-12-23 RX ADMIN — DOCUSATE SODIUM 200 MG: 100 CAPSULE, LIQUID FILLED ORAL at 08:12

## 2024-12-23 RX ADMIN — ACETAMINOPHEN 650 MG: 325 TABLET, FILM COATED ORAL at 08:12

## 2024-12-23 RX ADMIN — OXYCODONE 5 MG: 5 TABLET ORAL at 06:12

## 2024-12-23 RX ADMIN — OXYCODONE HYDROCHLORIDE 10 MG: 10 TABLET ORAL at 10:12

## 2024-12-23 RX ADMIN — IBUPROFEN 600 MG: 600 TABLET, FILM COATED ORAL at 04:12

## 2024-12-23 RX ADMIN — DEXTROSE MONOHYDRATE 5 MILLION UNITS: 5 INJECTION INTRAVENOUS at 01:12

## 2024-12-23 RX ADMIN — DEXTROSE MONOHYDRATE 3 MILLION UNITS: 50 INJECTION, SOLUTION INTRAVENOUS at 05:12

## 2024-12-23 RX ADMIN — METHOCARBAMOL 500 MG: 500 TABLET ORAL at 11:12

## 2024-12-23 RX ADMIN — OXYTOCIN-SODIUM CHLORIDE 0.9% IV SOLN 30 UNIT/500ML 10 UNITS: 30-0.9/5 SOLUTION at 03:12

## 2024-12-23 RX ADMIN — Medication 4 MILLI-UNITS/MIN: at 02:12

## 2024-12-23 RX ADMIN — DEXTROSE MONOHYDRATE 3 MILLION UNITS: 50 INJECTION, SOLUTION INTRAVENOUS at 09:12

## 2024-12-23 RX ADMIN — FENTANYL CITRATE 50 MCG: 50 INJECTION, SOLUTION INTRAMUSCULAR; INTRAVENOUS at 02:12

## 2024-12-23 RX ADMIN — IBUPROFEN 600 MG: 600 TABLET, FILM COATED ORAL at 09:12

## 2024-12-23 RX ADMIN — BUPIVACAINE HYDROCHLORIDE 6 ML: 2.5 INJECTION, SOLUTION EPIDURAL; INFILTRATION; INTRACAUDAL; PERINEURAL at 01:12

## 2024-12-23 RX ADMIN — CALCIUM CARBONATE 500 MG: 500 TABLET, CHEWABLE ORAL at 04:12

## 2024-12-23 RX ADMIN — FENTANYL CITRATE 10 ML/HR: 50 INJECTION INTRAMUSCULAR; INTRAVENOUS at 04:12

## 2024-12-23 RX ADMIN — DEXTROSE MONOHYDRATE 3 MILLION UNITS: 50 INJECTION, SOLUTION INTRAVENOUS at 02:12

## 2024-12-23 RX ADMIN — BUTALBITAL, ACETAMINOPHEN, AND CAFFEINE 1 TABLET: 325; 50; 40 TABLET ORAL at 11:12

## 2024-12-23 RX ADMIN — ONDANSETRON 8 MG: 8 TABLET, ORALLY DISINTEGRATING ORAL at 06:12

## 2024-12-23 RX ADMIN — BUPIVACAINE HYDROCHLORIDE 6 ML: 2.5 INJECTION, SOLUTION EPIDURAL; INFILTRATION; INTRACAUDAL; PERINEURAL at 02:12

## 2024-12-23 NOTE — PROGRESS NOTES
LABOR PROGRESS NOTE    MD to bedside for cervical check. Complaints: None. Epidural: Working    Temp:  [97.8 °F (36.6 °C)-98 °F (36.7 °C)] 97.8 °F (36.6 °C)  Pulse:  [] 96  Resp:  [16-17] 17  SpO2:  [95 %-100 %] 96 %  BP: (101-133)/(56-78) 119/69    FHT: Category 1 , baseline 140, +accel ,-decel, mod variability  CTX: q 2 minutes     ASSESSMENT   29 y.o.  at 39w1d, for IOL    TIMELINE  0850: 4/60/-2 AROM clr   1150: 4/60/-2 pit @ 12    PLAN  1. Labor management  -Place IUPC next check if remains unchanged  -Continue continuous maternal/fetal monitoring.   -Recheck 2-4 hours or PRN  -Pitocin per orders    Gilmar Yip MD PGY-3  Obstetrics and Gynecology

## 2024-12-23 NOTE — PROGRESS NOTES
LABOR PROGRESS NOTE    MD to bedside for cervical check. Complaints: None. Epidural: Working    Temp:  [97.8 °F (36.6 °C)-98 °F (36.7 °C)] 97.8 °F (36.6 °C)  Pulse:  [] 82  Resp:  [16-17] 17  SpO2:  [95 %-100 %] 100 %  BP: (101-133)/(56-75) 122/69    FHT: Category 1  , baseline 135, +accel ,-decel, mod variability  CTX: q 2 minutes     ASSESSMENT   29 y.o.  at 39w1d, for IOL    TIMELINE  0850: /-2 AROM clr     PLAN  1. Labor management  -Continue continuous maternal/fetal monitoring.   -Recheck 2-4 hours or PRN  -Pitocin per orders    Gilmar Yip MD PGY-3  Obstetrics and Gynecology

## 2024-12-23 NOTE — L&D DELIVERY NOTE
"[facility] - Labor & Delivery  Vaginal Delivery   Operative Note    SUMMARY     Normal spontaneous vaginal delivery of live infant, was placed on mothers abdomen for skin to skin and bulb suctioning performed.    Infant delivered position OA over intact perineum.  Nuchal cord: No.    Spontaneous delivery of placenta and IV pitocin given noting good uterine tone.  1st degree laceration noted and repaired with 2-0 vicryl suture.    Patient tolerated delivery well. Sponge needle and lap counted correctly x2.    Indications:  (spontaneous vaginal delivery)  Pregnancy complicated by:   Patient Active Problem List   Diagnosis    KRIS (iron deficiency anemia)    Functional diarrhea    Hyperemesis    Positive GBS test    BMI 45.0-49.9, adult    HSV-2 (herpes simplex virus 2) infection    Seasonal allergic rhinitis    Amenorrhea    Encounter for supervision of normal pregnancy in third trimester    Urinary tract infection affecting care of mother in second trimester, antepartum    S/p  (spontaneous vaginal delivery)     Admitting GA: 39w1d    Delivery Information for Almaz Garcia    Birth information:  YOB: 2022   Time of birth: 3:18 PM   Sex: female   Head Delivery Date/Time: 2024  3:18 PM   Delivery type: Vaginal, Spontaneous   Gestational Age: 39w1d       Delivery Providers    Delivering clinician: Noy Robles MD   Provider Role    Kisha Lloyd RN Griffiths, Lauren, RN Giraldo, Valentina, MD               Measurements    Weight: 3240 g  Weight (lbs): 7 lb 2.3 oz  Length: 50.2 cm  Length (in): 19.75"  Head circumference: 35.6 cm  Chest circumference: 33.7 cm         Apgars    Living status: Living  Apgar Component Scores:  1 min.:  5 min.:  10 min.:  15 min.:  20 min.:    Skin color:  1  1       Heart rate:  2  2       Reflex irritability:  2  2       Muscle tone:  2  2       Respiratory effort:  2  2       Total:  9  9       Apgars assigned by: JESSI MOSS CLC   "                 Presentation    Presentation: Vertex           Interventions/Resuscitation    Method: Bulb Suctioning       Cord    Vessels: 3 vessels  Complications: None  Delayed Cord Clamping?: Yes  Cord Clamped Date/Time: 2024  3:19 PM  Cord Blood Disposition: Sent with Baby  Gases Sent?: No       Placenta    Placenta delivery date/time: 2024 1523  Placenta removal: Spontaneous  Placenta appearance: Intact  Placenta disposition: Donation           Labor Events:       labor: No     Labor Onset Date/Time: 2024 00:00     Dilation Complete Date/Time:         Start Pushing Date/Time:         Start Pushing Date/Time:       Rupture Date/Time: 24 0900        Rupture type: ARM (Artificial Rupture)        Fluid Amount:       Fluid Color: Clear              steroids: None     Antibiotics given for GBS: Yes     Induction: oxytocin     Indications for induction:  Elective     Augmentation:       Indications for augmentation:       Labor complications: None     Additional complications:          Cervical ripening:                     Delivery:      Episiotomy: None     Indication for Episiotomy:       Perineal Lacerations: 1st Repaired:  Yes   Periurethral Laceration:   Repaired:     Labial Laceration:   Repaired:     Sulcus Laceration:   Repaired:     Vaginal Laceration:   Repaired:     Cervical Laceration:   Repaired:     Repair suture:       Repair # of packets: 2     Last Value - EBL - Nursing (mL):       Sum - EBL - Nursing (mL): 0     Last Value - EBL - Anesthesia (mL):      Calculated QBL (mL): 159     Running total QBL (mL): 159     Vaginal Sweep Performed: Yes     Surgicount Correct: Yes     Vaginal Packing: No Quantity:       Other providers:       Anesthesia    Method: Epidural          Details (if applicable):  Trial of Labor      Categorization:      Priority:     Indications for :     Incision Type:       Additional   information:  Forceps:    Vacuum:    Breech:    Observed anomalies    Other (Comments):       Gilmar Yip MD PGY-3  Obstetrics and Gynecology

## 2024-12-23 NOTE — ANESTHESIA PROCEDURE NOTES
Epidural    Patient location during procedure: OB   Reason for block: primary anesthetic   Reason for block: labor analgesia requested by patient and obstetrician   Surgery related to: Vaginal Delivery    Staffing  Performing Provider: Olvin Bruno MD  Authorizing Provider: Leslie Parks MD    Staffing  Performed by: Olvin Bruno MD  Authorized by: Leslie Parks MD        Preanesthetic Checklist  Completed: patient identified, IV checked, site marked, risks and benefits discussed, surgical consent, monitors and equipment checked, pre-op evaluation, timeout performed, anesthesia consent given, hand hygiene performed and patient being monitored  Preparation  Patient position: sitting  Prep: ChloraPrep  Patient monitoring: Pulse Ox  Reason for block: primary anesthetic   Epidural  Skin Anesthetic: lidocaine 1%  Skin Wheal: 3 mL  Administration type: continuous  Approach: midline  Interspace: L3-4    Injection technique: JOSE ARMANDO saline  Needle and Epidural Catheter  Needle type: Tuohy   Needle gauge: 17  Needle length: 3.5 inches  Needle insertion depth: 9 cm  Catheter type: springwound  Catheter size: 19 G  Catheter at skin depth: 13 cm  Insertion Attempts: 3  Test dose: 3 mL of lidocaine 1.5% with Epi 1-to-200,000  Additional Documentation: incremental injection, negative aspiration for heme and CSF, no paresthesia on injection, no signs/symptoms of IV or SA injection, no significant pain on injection and no significant complaints from patient  Needle localization: anatomical landmarks  Medications:  Volume per aspiration: 5 mL  Time between aspirations: 5 minutes   Assessment  Ease of block: easy  Patient's tolerance of the procedure: comfortable throughout block and no complaints No inadvertent dural puncture with Tuohy.  Dural puncture performed with spinal needle.

## 2024-12-23 NOTE — ANESTHESIA PREPROCEDURE EVALUATION
[facility]  Anesthesia Pre-Operative Evaluation     Patient Name: Frankie Garcia  YOB: 1995  MRN: [id number]  [facility]: [id number]      Code Status: Full Code   Date of Procedure:   Anesthesia: * No surgery found * Procedure: * No surgery found *  Pre-Operative Diagnosis: * No surgery found *  Proceduralist: * Surgery not found * * Surgery not found *        SUBJECTIVE:     Pre-operative evaluation for * No surgery found *     12/23/2024    Frankie Garcia is a 29 y.o. female with KRIS and obesity.        Patient now presents for the above procedure(s).       Anticoagulants   Medication Route Frequency        ________________________________________  No results found for this or any previous visit.    ________________________________________    Prev airway: None documented.            LDA:   Preanesthetic Checklist  Completed: patient identified, surgical consent, pre-op evaluation, timeout performed, IV checked, risks and benefits discussed, monitors and equipment checked, anesthesia consent given, hand hygiene performed and patient being monitored  Preparation  Patient position: sitting  Prep: ChloraPrep  Patient monitoring: Pulse Ox and Blood Pressure  Epidural  Skin Anesthetic: lidocaine 1%  Skin Wheal: 3 mL  Administration type: continuous  Approach: midline  Interspace: L3-4    Injection technique: JOSE ARMANDO air  Needle and Epidural Catheter  Needle type: Tuohy   Needle gauge: 17  Needle length: 3.5 inches  Needle insertion depth: 9 cm  Catheter type: springwound and multi-orifice  Catheter size: 19 G  Catheter at skin depth: 13 cm  Test dose: 3 mL of lidocaine 1.5% with Epi 1-to-200,000  Additional Documentation: incremental injection, negative aspiration for heme and CSF, no paresthesia on injection, no signs/symptoms of IV or SA injection, no significant pain on injection and no significant complaints from patient  Needle localization: anatomical  landmarks  Medications:  Volume per aspiration: 5 mL  Time between aspirations: 5 minutes  Assessment  Ease of block: easy  Patient's tolerance of the procedure: comfortable throughout block and no complaintsNo inadvertent dural puncture with Tuohy.  Dural puncture performed with spinal needle.       Drips:    lactated ringers   Intravenous Continuous           Patient Active Problem List   Diagnosis    KRIS (iron deficiency anemia)    Functional diarrhea    Hyperemesis    Positive GBS test    BMI 45.0-49.9, adult    HSV-2 (herpes simplex virus 2) infection    Seasonal allergic rhinitis    Amenorrhea    Encounter for supervision of normal pregnancy in third trimester    Urinary tract infection affecting care of mother in second trimester, antepartum       Review of patient's allergies indicates:   Allergen Reactions    Boostrix tdap [diphth,pertus(acell),tetanus] Swelling and Rash    Ciprofloxacin Hives    Sulfamethoxazole-trimethoprim Hives    Doxycycline      Swelling and rash to face    Prednisone (bulk) Hives, Itching, Palpitations and Rash       Current Inpatient Medications:    pencillin G potassium IV (PEDS and ADULTS)  3 Million Units Intravenous Q4H    pencillin G potassium IV (PEDS and ADULTS)  5 Million Units Intravenous Once       No current facility-administered medications on file prior to encounter.     Current Outpatient Medications on File Prior to Encounter   Medication Sig Dispense Refill    aspirin (ECOTRIN) 81 MG EC tablet Take 1 tablet (81 mg total) by mouth once daily.      azelastine (ASTELIN) 137 mcg (0.1 %) nasal spray 1 spray (137 mcg total) by Nasal route every evening. 30 mL 0    cetirizine (ZYRTEC) 10 MG tablet Take 1 tablet (10 mg total) by mouth once daily. 30 tablet 0    doxylamine succinate 25 mg tablet Take 1 tablet (25 mg total) by mouth 2 (two) times a day. 20 tablet 0    famotidine (PEPCID) 40 MG tablet Take 1 tablet (40 mg total) by mouth nightly as needed for Heartburn. 30  tablet 3    metoclopramide HCl (REGLAN) 10 MG tablet Take 1 tablet (10 mg total) by mouth every 6 (six) hours. 30 tablet 0    ondansetron (ZOFRAN) 4 MG tablet Take 1 tablet (4 mg total) by mouth daily as needed for Nausea. 30 tablet 0    prenatal vit no.124/iron/folic (PRENATAL VITAMIN ORAL) Take by mouth.      promethazine (PHENERGAN) 25 MG suppository Place 1 suppository (25 mg total) rectally every 6 (six) hours as needed for Nausea. 10 suppository 0    pyridoxine, vitamin B6, (B-6) 25 MG Tab Take 1 tablet (25 mg total) by mouth 2 (two) times a day. 20 tablet 0    valACYclovir (VALTREX) 500 MG tablet Take 1 tablet (500 mg total) by mouth 2 (two) times daily. 60 tablet 11       Past Surgical History:   Procedure Laterality Date    NO PAST SURGERIES  02/18/2019       Social History:  Tobacco Use: Medium Risk (12/17/2024)    Patient History     Smoking Tobacco Use: Former     Smokeless Tobacco Use: Never     Passive Exposure: Not on file       Alcohol Use: Not At Risk (11/13/2023)    AUDIT-C     Frequency of Alcohol Consumption: 2-4 times a month     Average Number of Drinks: 1 or 2     Frequency of Binge Drinking: Never       OBJECTIVE:     Vital Signs Range:  BMI Readings from Last 1 Encounters:   12/17/24 45.16 kg/m²               Significant Labs:        Component Value Date/Time    WBC 8.20 11/26/2024 1608    HGB 11.0 (L) 11/26/2024 1608    HCT 31.7 (L) 11/26/2024 1608     11/26/2024 1608     (L) 05/30/2024 1108    K 4.1 05/30/2024 1108     05/30/2024 1108    CO2 19 (L) 05/30/2024 1108    GLU 84 05/30/2024 1108    BUN 9 05/30/2024 1108    CREATININE 0.7 05/30/2024 1108    CALCIUM 9.3 05/30/2024 1108    ALBUMIN 3.8 05/30/2024 1108    PROT 7.0 05/30/2024 1108    ALKPHOS 64 05/30/2024 1108    BILITOT 0.6 05/30/2024 1108    AST 13 05/30/2024 1108    ALT 11 05/30/2024 1108    HGBA1C 5.0 04/28/2023 0738        Please see Results Review for additional labs.     Diagnostic Studies: No relevant  studies.    EKG:   Results for orders placed or performed during the hospital encounter of 01/27/21   EKG 12-lead    Collection Time: 01/27/21  4:01 PM    Narrative    Test Reason : T78.40XA,    Vent. Rate : 099 BPM     Atrial Rate : 099 BPM     P-R Int : 126 ms          QRS Dur : 076 ms      QT Int : 332 ms       P-R-T Axes : 051 068 027 degrees     QTc Int : 426 ms    Normal sinus rhythm  Normal ECG  When compared with ECG of 18-FEB-2019 16:33,  No significant change was found  Confirmed by Huan Marvin MD ([id number]) on 1/28/2021 7:06:43 PM    Referred By: MARCO ANTONIO   SELF           Confirmed By:Huan Marvin MD       ECHO:  See subjective, if available.      ASSESSMENT/PLAN:           Pre-op Assessment    I have reviewed the Patient Summary Reports.    I have reviewed the NPO Status.   I have reviewed the Medications.     Review of Systems  Anesthesia Hx:  No problems with previous Anesthesia   History of prior surgery of interest to airway management or planning:  Previous anesthesia: General        Denies Family Hx of Anesthesia complications.     Renal/:  Chronic Renal Disease        Kidney Function/Disease                 Physical Exam  General: Well nourished, Cooperative, Alert and Oriented    Airway:  Mallampati: I   Mouth Opening: Normal  TM Distance: Normal  Tongue: Normal  Neck ROM: Normal ROM    Dental:  Intact    Chest/Lungs:  Clear to auscultation, Normal Respiratory Rate    Heart:  Rate: Normal  Rhythm: Regular Rhythm  Sounds: Normal        Anesthesia Plan  Type of Anesthesia, risks & benefits discussed:    Anesthesia Type: Gen ETT, Gen Supraglottic Airway, Gen Natural Airway, MAC  Intra-op Monitoring Plan: Standard ASA Monitors  Post Op Pain Control Plan: multimodal analgesia and IV/PO Opioids PRN  Induction:  IV  Airway Plan: Direct and Video, Post-Induction  Informed Consent: Informed consent signed with the Patient and all parties understand the risks and agree with anesthesia plan.  All questions  answered.   ASA Score: 2  Day of Surgery Review of History & Physical: H&P Update referred to the surgeon/provider.I have interviewed and examined the patient. I have reviewed the patient's H&P dated:     Ready For Surgery From Anesthesia Perspective.     .

## 2024-12-23 NOTE — PROGRESS NOTES
"LABOR NOTE    S:  Complaints: No.  Epidural Working:  not applicable  Resident to bedside for routine cervical check.     O: /60   Pulse 62   Temp 97.8 °F (36.6 °C) (Oral)   Resp 17   Ht 5' 5.98" (1.676 m)   Wt 126.9 kg (279 lb 12.2 oz)   LMP 03/13/2024   SpO2 100%   Breastfeeding No   BMI 45.18 kg/m²     FHT: 130, mod variability, +accels, -decels Cat 1 (reassuring)  CTX: q 2-4 minutes, pit @ 8  SVE: 2/60/-3    TIMELINE:   0430: 2/60/-3, pit @ 8     PLAN:      Continue Close Maternal/Fetal Monitoring  Pitocin Augmentation per protocol  Recheck 4 hours or PRN    2. GBS+  - PCN intrapartum     3. Obesity   - ppBMI 43  - Encourage ambulation   - pp lovenox 40mg BID      4. HSV  - negative SSE on admission   - asymptomatic      5. Anemia  - asymptomatic  - CBC on admit pending   - PPH risk medium         Nicol Bautista MD  Obstetrics & Gynecology, PGY-1    "

## 2024-12-23 NOTE — H&P
HISTORY AND PHYSICAL                                                OBSTETRICS          Subjective:       Frankie Garcia is a 29 y.o.  female with IUP at 39w1d weeks gestation who presents for IOL.    Patient denies contractions, denies vaginal bleeding, denies LOF.   Fetal Movement: normal.     This IUP is complicated by GBS+, Obesity (ppBMI 43), Anemia & HSV.    Review of Systems   Constitutional:  Negative for chills and fever.   HENT:  Negative for nasal congestion and mouth sores.    Eyes:  Negative for visual disturbance.   Respiratory:  Negative for cough and shortness of breath.    Cardiovascular:  Negative for chest pain, palpitations and leg swelling.   Gastrointestinal:  Negative for constipation, diarrhea, nausea and vomiting.   Genitourinary:  Negative for dysuria, pelvic pain, vaginal bleeding and vaginal discharge.   Musculoskeletal:  Negative for joint swelling.   Integumentary:  Negative for rash.   Neurological:  Negative for syncope, numbness and headaches.   Psychiatric/Behavioral:  The patient is not nervous/anxious.        PMHx:   Past Medical History:   Diagnosis Date    Anemia     Influenza 2019    Pyelonephritis 2019    sepsis    Seasonal allergic rhinitis 2021       PSHx:   Past Surgical History:   Procedure Laterality Date    NO PAST SURGERIES  2019       All:   Review of patient's allergies indicates:   Allergen Reactions    Boostrix tdap [diphth,pertus(acell),tetanus] Swelling and Rash    Ciprofloxacin Hives    Sulfamethoxazole-trimethoprim Hives    Doxycycline      Swelling and rash to face    Prednisone (bulk) Hives, Itching, Palpitations and Rash       Meds:   Medications Prior to Admission   Medication Sig Dispense Refill Last Dose/Taking    aspirin (ECOTRIN) 81 MG EC tablet Take 1 tablet (81 mg total) by mouth once daily.       azelastine (ASTELIN) 137 mcg (0.1 %) nasal spray 1 spray (137 mcg total) by Nasal route every evening. 30 mL 0      cetirizine (ZYRTEC) 10 MG tablet Take 1 tablet (10 mg total) by mouth once daily. 30 tablet 0     doxylamine succinate 25 mg tablet Take 1 tablet (25 mg total) by mouth 2 (two) times a day. 20 tablet 0     famotidine (PEPCID) 40 MG tablet Take 1 tablet (40 mg total) by mouth nightly as needed for Heartburn. 30 tablet 3     metoclopramide HCl (REGLAN) 10 MG tablet Take 1 tablet (10 mg total) by mouth every 6 (six) hours. 30 tablet 0     ondansetron (ZOFRAN) 4 MG tablet Take 1 tablet (4 mg total) by mouth daily as needed for Nausea. 30 tablet 0     prenatal vit no.124/iron/folic (PRENATAL VITAMIN ORAL) Take by mouth.       promethazine (PHENERGAN) 25 MG suppository Place 1 suppository (25 mg total) rectally every 6 (six) hours as needed for Nausea. 10 suppository 0     pyridoxine, vitamin B6, (B-6) 25 MG Tab Take 1 tablet (25 mg total) by mouth 2 (two) times a day. 20 tablet 0     valACYclovir (VALTREX) 500 MG tablet Take 1 tablet (500 mg total) by mouth 2 (two) times daily. 60 tablet 11        SH:   Social History     Socioeconomic History    Marital status: Single    Number of children: 1   Tobacco Use    Smoking status: Former     Current packs/day: 0.00     Average packs/day: 0.1 packs/day for 2.0 years (0.2 ttl pk-yrs)     Types: Cigarettes     Start date: 2017     Quit date: 2019     Years since quittin.9    Smokeless tobacco: Never   Substance and Sexual Activity    Alcohol use: Not Currently     Comment: socially, monthly  - 2 drinks per month    Drug use: No    Sexual activity: Not Currently     Partners: Female, Male     Birth control/protection: None   Other Topics Concern    Are you pregnant or think you may be? Yes     Comment: 31 weeks pregnant almost 32    Breast-feeding No     Social Drivers of Health     Financial Resource Strain: Low Risk  (2023)    Overall Financial Resource Strain (CARDIA)     Difficulty of Paying Living Expenses: Not very hard   Food Insecurity: No Food  "Insecurity (2023)    Hunger Vital Sign     Worried About Running Out of Food in the Last Year: Never true     Ran Out of Food in the Last Year: Never true   Transportation Needs: No Transportation Needs (2023)    PRAPARE - Transportation     Lack of Transportation (Medical): No     Lack of Transportation (Non-Medical): No   Physical Activity: Insufficiently Active (2023)    Exercise Vital Sign     Days of Exercise per Week: 3 days     Minutes of Exercise per Session: 20 min   Stress: No Stress Concern Present (2023)    [employer] - Occupational Stress Questionnaire     Feeling of Stress : Not at all   Housing Stability: Low Risk  (2023)    Housing Stability Vital Sign     Unable to Pay for Housing in the Last Year: No     Number of Places Lived in the Last Year: 1     Unstable Housing in the Last Year: No       FH:   Family History   Problem Relation Name Age of Onset    No Known Problems Mother      Arthritis Father Andrew Momin     Thyroid disease Father Andrew Momin     No Known Problems Sister      No Known Problems Brother      Diabetes Maternal Aunt B     Thyroid disease Maternal Grandmother      Arthritis Maternal Grandfather      Thyroid disease Paternal Grandmother      Breast cancer Neg Hx      Colon cancer Neg Hx      Ovarian cancer Neg Hx      Melanoma Neg Hx      Esophageal cancer Neg Hx         OBHx:   OB History    Para Term  AB Living   2 1 1 0 0 1   SAB IAB Ectopic Multiple Live Births   0 0 0 0 1      # Outcome Date GA Lbr Atif/2nd Weight Sex Type Anes PTL Lv   2 Current            1 Term 20 39w5d  2.863 kg (6 lb 5 oz) M Vag-Spont EPI N LATOYA      Name: KASSI MOMIN      Apgar1: 9  Apgar5: 9       Objective:       /67   Pulse 76   Temp 98 °F (36.7 °C) (Oral)   Resp 16   Ht 5' 5.98" (1.676 m)   Wt 126.9 kg (279 lb 12.2 oz)   LMP 2024   SpO2 98%   Breastfeeding No   BMI 45.18 kg/m²     Vitals:    " "12/23/24 0113 12/23/24 0115 12/23/24 0117 12/23/24 0130   BP:  114/67     Pulse: 75 71 76    Resp:  16     Temp:  98 °F (36.7 °C)     TempSrc:  Oral     SpO2:  98%     Weight:    126.9 kg (279 lb 12.2 oz)   Height:    5' 5.98" (1.676 m)       General: NAD, alert, oriented, cooperative  HEENT: NCAT, EOM grossly intact  Lungs: Normal WOB  Heart: regular rate  Abdomen: gravid, soft, nondistended, nontender, no rebound or guarding  Extremities: no edema    FHT: 130 bpm, moderate BTBV, +accels, -decels; Cat 1 (reassuring)  [employer]: q 3-5mins  Presentation: cephalic by ultrasound     Cervix: 2/60/-3  SSE: negative for lesions    EFW by Leopold's: difficult to determine 2/2 habitus     Maternal pelvis proven to 6#5    Lab Review  Blood Type O POS  GBBS: positive  Rubella: Immune  Treponemal Ab: 3T negative, pending on admit  HIV: negative  HepB: negative       Assessment:       39w1d weeks gestation admitted for IOL    Active Hospital Problems    Diagnosis  POA    *Encounter for induction of labor [Z34.90]  Not Applicable    HSV-2 (herpes simplex virus 2) infection [B00.9]  Yes    BMI 45.0-49.9, adult [Z68.42]  Not Applicable    Positive GBS test [B95.1]  Yes    KRIS (iron deficiency anemia) [D50.9]  Yes      Resolved Hospital Problems   No resolved problems to display.          Plan:     IOL   Risks, benefits, alternatives and possible complications have been discussed in detail with the patient.   - Consents signed and to chart  - Admit to Labor and Delivery unit  - Epidural per Anesthesia  - Draw CBC, T&S  - Notify Staff  - Recheck in 4 hrs or PRN  - Post-Partum Hemorrhage risk - medium  - Postpartum lovenox: 40mg BID  - Contraception: per primary OB  - Plan for pitocin    2. GBS+  - PCN intrapartum    3. Obesity   - ppBMI 43  - Encourage ambulation   - pp lovenox 40mg BID     4. HSV  - negative SSE on admission   - asymptomatic     5. Anemia  - asymptomatic  - CBC on admit pending   - PPH risk medium  - consider iron " supplementation postpartum    Preethi Wooten MD  OB/GYN PGY-2

## 2024-12-23 NOTE — ED NOTES
29 y.o.  39w1d by 6wk US      Dx: IOL, GBS+, Obesity (ppBMI 43), Anemia, HSV (neg spec)  PPHRisk: medium  SVE: /-2 @ 0850   Mem: AROm clr 0850  US: vtx  Chung: 7#  Meds: Epid, Pit, PCN  BC:   Labs:O POS/RI/HIVneg/TrepNR/GBBSpos      [ ] OB - Travis  [ ] Girl

## 2024-12-24 PROBLEM — Z34.90 ENCOUNTER FOR INDUCTION OF LABOR: Status: RESOLVED | Noted: 2024-12-23 | Resolved: 2024-12-24

## 2024-12-24 LAB
BASOPHILS # BLD AUTO: 0.04 K/UL (ref 0–0.2)
BASOPHILS NFR BLD: 0.3 % (ref 0–1.9)
DIFFERENTIAL METHOD BLD: ABNORMAL
EOSINOPHIL # BLD AUTO: 0.1 K/UL (ref 0–0.5)
EOSINOPHIL NFR BLD: 0.7 % (ref 0–8)
ERYTHROCYTE [DISTWIDTH] IN BLOOD BY AUTOMATED COUNT: 13 % (ref 11.5–14.5)
HCT VFR BLD AUTO: 30.1 % (ref 37–48.5)
HGB BLD-MCNC: 10.6 G/DL (ref 12–16)
IMM GRANULOCYTES # BLD AUTO: 0.04 K/UL (ref 0–0.04)
IMM GRANULOCYTES NFR BLD AUTO: 0.3 % (ref 0–0.5)
LYMPHOCYTES # BLD AUTO: 2.2 K/UL (ref 1–4.8)
LYMPHOCYTES NFR BLD: 18.7 % (ref 18–48)
MCH RBC QN AUTO: 31.1 PG (ref 27–31)
MCHC RBC AUTO-ENTMCNC: 35.2 G/DL (ref 32–36)
MCV RBC AUTO: 88 FL (ref 82–98)
MONOCYTES # BLD AUTO: 0.9 K/UL (ref 0.3–1)
MONOCYTES NFR BLD: 8 % (ref 4–15)
NEUTROPHILS # BLD AUTO: 8.4 K/UL (ref 1.8–7.7)
NEUTROPHILS NFR BLD: 72 % (ref 38–73)
NRBC BLD-RTO: 0 /100 WBC
PLATELET # BLD AUTO: 158 K/UL (ref 150–450)
PMV BLD AUTO: 9.9 FL (ref 9.2–12.9)
RBC # BLD AUTO: 3.41 M/UL (ref 4–5.4)
WBC # BLD AUTO: 11.68 K/UL (ref 3.9–12.7)

## 2024-12-24 PROCEDURE — 85025 COMPLETE CBC W/AUTO DIFF WBC: CPT | Performed by: OBSTETRICS & GYNECOLOGY

## 2024-12-24 PROCEDURE — 11000001 HC ACUTE MED/SURG PRIVATE ROOM

## 2024-12-24 PROCEDURE — 25000003 PHARM REV CODE 250

## 2024-12-24 PROCEDURE — 36415 COLL VENOUS BLD VENIPUNCTURE: CPT | Performed by: OBSTETRICS & GYNECOLOGY

## 2024-12-24 PROCEDURE — 63600175 PHARM REV CODE 636 W HCPCS

## 2024-12-24 RX ORDER — METHOCARBAMOL 500 MG/1
500 TABLET, FILM COATED ORAL 4 TIMES DAILY PRN
Qty: 30 TABLET | Refills: 0 | Status: SHIPPED | OUTPATIENT
Start: 2024-12-24 | End: 2025-01-03

## 2024-12-24 RX ORDER — ENOXAPARIN SODIUM 100 MG/ML
40 INJECTION SUBCUTANEOUS EVERY 12 HOURS
Status: DISCONTINUED | OUTPATIENT
Start: 2024-12-24 | End: 2024-12-25 | Stop reason: HOSPADM

## 2024-12-24 RX ORDER — DOCUSATE SODIUM 100 MG/1
200 CAPSULE, LIQUID FILLED ORAL 2 TIMES DAILY PRN
Qty: 60 CAPSULE | Refills: 0 | Status: SHIPPED | OUTPATIENT
Start: 2024-12-24

## 2024-12-24 RX ORDER — LANOLIN ALCOHOL/MO/W.PET/CERES
1 CREAM (GRAM) TOPICAL DAILY
Status: DISCONTINUED | OUTPATIENT
Start: 2024-12-24 | End: 2024-12-25 | Stop reason: HOSPADM

## 2024-12-24 RX ORDER — BUTALBITAL, ACETAMINOPHEN AND CAFFEINE 50; 325; 40 MG/1; MG/1; MG/1
1 TABLET ORAL EVERY 4 HOURS PRN
Qty: 30 TABLET | Refills: 0 | Status: SHIPPED | OUTPATIENT
Start: 2024-12-24 | End: 2025-01-23

## 2024-12-24 RX ORDER — FERROUS SULFATE 325(65) MG
325 TABLET, DELAYED RELEASE (ENTERIC COATED) ORAL DAILY
Qty: 30 TABLET | Refills: 0 | Status: SHIPPED | OUTPATIENT
Start: 2024-12-24

## 2024-12-24 RX ORDER — IBUPROFEN 600 MG/1
600 TABLET ORAL EVERY 6 HOURS PRN
Qty: 30 TABLET | Refills: 0 | Status: SHIPPED | OUTPATIENT
Start: 2024-12-24

## 2024-12-24 RX ADMIN — IBUPROFEN 600 MG: 600 TABLET, FILM COATED ORAL at 05:12

## 2024-12-24 RX ADMIN — METHOCARBAMOL 500 MG: 500 TABLET ORAL at 02:12

## 2024-12-24 RX ADMIN — METHOCARBAMOL 500 MG: 500 TABLET ORAL at 08:12

## 2024-12-24 RX ADMIN — SIMETHICONE 80 MG: 80 TABLET, CHEWABLE ORAL at 06:12

## 2024-12-24 RX ADMIN — ACETAMINOPHEN 650 MG: 325 TABLET, FILM COATED ORAL at 05:12

## 2024-12-24 RX ADMIN — OXYCODONE HYDROCHLORIDE 10 MG: 10 TABLET ORAL at 08:12

## 2024-12-24 RX ADMIN — BUTALBITAL, ACETAMINOPHEN, AND CAFFEINE 1 TABLET: 325; 50; 40 TABLET ORAL at 10:12

## 2024-12-24 RX ADMIN — IBUPROFEN 600 MG: 600 TABLET, FILM COATED ORAL at 11:12

## 2024-12-24 RX ADMIN — ENOXAPARIN SODIUM 40 MG: 40 INJECTION SUBCUTANEOUS at 10:12

## 2024-12-24 RX ADMIN — FERROUS SULFATE TAB 325 MG (65 MG ELEMENTAL FE) 1 EACH: 325 (65 FE) TAB at 08:12

## 2024-12-24 RX ADMIN — IBUPROFEN 600 MG: 600 TABLET, FILM COATED ORAL at 10:12

## 2024-12-24 RX ADMIN — BUTALBITAL, ACETAMINOPHEN, AND CAFFEINE 1 TABLET: 325; 50; 40 TABLET ORAL at 06:12

## 2024-12-24 RX ADMIN — ENOXAPARIN SODIUM 40 MG: 40 INJECTION SUBCUTANEOUS at 08:12

## 2024-12-24 RX ADMIN — OXYCODONE HYDROCHLORIDE 10 MG: 10 TABLET ORAL at 12:12

## 2024-12-24 RX ADMIN — DOCUSATE SODIUM 200 MG: 100 CAPSULE, LIQUID FILLED ORAL at 08:12

## 2024-12-24 RX ADMIN — OXYCODONE HYDROCHLORIDE 10 MG: 10 TABLET ORAL at 04:12

## 2024-12-24 RX ADMIN — BUTALBITAL, ACETAMINOPHEN, AND CAFFEINE 1 TABLET: 325; 50; 40 TABLET ORAL at 08:12

## 2024-12-24 RX ADMIN — OXYCODONE HYDROCHLORIDE 10 MG: 10 TABLET ORAL at 03:12

## 2024-12-24 RX ADMIN — PRENATAL VIT W/ FE FUMARATE-FA TAB 27-0.8 MG 1 TABLET: 27-0.8 TAB at 08:12

## 2024-12-24 RX ADMIN — BUTALBITAL, ACETAMINOPHEN, AND CAFFEINE 1 TABLET: 325; 50; 40 TABLET ORAL at 02:12

## 2024-12-24 NOTE — PROGRESS NOTES
Pt ambulated to bathroom with assistance, pt denies weakness or dizziness, pt unable to void, pericare performed.    Report given to bobby Rosa    Pt transferred from ldr 3 to mbu rm 633 via wheelchair, pt oriented to room, call bell within reach. Family and baby at bedside.

## 2024-12-24 NOTE — PROGRESS NOTES
[facility])  Obstetrics  Postpartum Progress Note    Patient Name: Frankie Garcia  MRN: [id number]  Admission Date: 2024  Hospital Length of Stay: 1 days  Attending Physician: Noy Robles MD  Primary Care Provider: Jelani Nolan MD    Subjective:     Principal Problem: (spontaneous vaginal delivery)    Hospital Course:  24 PPD1 - Doing well today, normal lochia, pain controlled with PO meds. Formula feeding infant. Denies s/s of pre-E. C/o spinal headache relieved with PO meds. Anemia asymptomatic. Desires discharge home today.    Interval History: PPD1    She is doing well this morning. She is tolerating a regular diet without nausea or vomiting. She is voiding spontaneously. She is ambulating. She has passed flatus, and has not a BM. Vaginal bleeding is mild. She denies fever or chills. Abdominal pain is mild and controlled with oral medications. She Is not breastfeeding. She desires circumcision for her male baby: not applicable.    Objective:     Vital Signs (Most Recent):  Temp: 97.7 °F (36.5 °C) (24)  Pulse: 65 (24)  Resp: 17 (24)  BP: (!) 112/59 (24)  SpO2: 98 % (24) Vital Signs (24h Range):  Temp:  [97.7 °F (36.5 °C)-98.7 °F (37.1 °C)] 97.7 °F (36.5 °C)  Pulse:  [] 65  Resp:  [16-18] 17  SpO2:  [95 %-100 %] 98 %  BP: (102-140)/(50-97) 112/59     Weight: 126.9 kg (279 lb 12.2 oz)  Body mass index is 45.18 kg/m².      Intake/Output Summary (Last 24 hours) at 2024 0857  Last data filed at 2024 0100  Gross per 24 hour   Intake 351.01 ml   Output 1509 ml   Net -1157.99 ml         Significant Labs:  Lab Results   Component Value Date    GROUPTRH O POS 2024    HEPBSAG Non-reactive 2024    STREPBCULT (A) 2020     STREPTOCOCCUS AGALACTIAE (GROUP B)  Beta-hemolytic streptococci are routinely susceptible to   penicillins,cephalosporins and carbapenems.       Recent Labs   Lab  24  0414   HGB 10.6*   HCT 30.1*       I have personallly reviewed all pertinent lab results from the last 24 hours.    Physical Exam:   Constitutional: She is oriented to person, place, and time. She appears well-developed and well-nourished.    HENT:   Head: Normocephalic and atraumatic.    Eyes: Conjunctivae are normal.     Cardiovascular:  Normal rate.             Pulmonary/Chest: Effort normal.        Abdominal: Soft. There is no abdominal tenderness.     Genitourinary: There is vaginal discharge (lochia) in the vagina.           Musculoskeletal: Normal range of motion.       Neurological: She is alert and oriented to person, place, and time.    Skin: Skin is warm and dry.    Psychiatric: She has a normal mood and affect. Her behavior is normal. Judgment and thought content normal.       Review of Systems   Constitutional:  Negative for chills and fever.   Eyes: Negative.  Negative for visual disturbance.   Respiratory: Negative.     Cardiovascular: Negative.    Gastrointestinal:  Positive for abdominal pain (cramping). Negative for nausea and vomiting.   Endocrine: Negative.    Genitourinary:  Positive for vaginal bleeding (lochia). Negative for vaginal odor.   Musculoskeletal: Negative.    Integumentary:  Negative.   Neurological:  Positive for headaches (spinal, relieved with PO meds).   Hematological: Negative.    Psychiatric/Behavioral: Negative.     Breast: negative.      Assessment/Plan:     29 y.o. female  for:    * S/p  (spontaneous vaginal delivery)  24 PPD1 - Doing well today, normal lochia, pain controlled with PO meds. Formula feeding infant. Denies s/s of pre-E. C/o spinal headache relieved with PO meds. Anemia asymptomatic. Desires discharge home today.    KRIS (iron deficiency anemia)  Asymptomatic        Disposition: As patient meets milestones, will plan to discharge today.    Lesly Olivera CNM  Obstetrics  [facility] ([place])

## 2024-12-24 NOTE — ASSESSMENT & PLAN NOTE
12/24/24 PPD1 - Doing well today, normal lochia, pain controlled with PO meds. Formula feeding infant. Denies s/s of pre-E. C/o spinal headache relieved with PO meds. Anemia asymptomatic. Desires discharge home today.

## 2024-12-24 NOTE — SUBJECTIVE & OBJECTIVE
Interval History: PPD1    She is doing well this morning. She is tolerating a regular diet without nausea or vomiting. She is voiding spontaneously. She is ambulating. She has passed flatus, and has not a BM. Vaginal bleeding is mild. She denies fever or chills. Abdominal pain is mild and controlled with oral medications. She Is not breastfeeding. She desires circumcision for her male baby: not applicable.    Objective:     Vital Signs (Most Recent):  Temp: 97.7 °F (36.5 °C) (12/24/24 0815)  Pulse: 65 (12/24/24 0815)  Resp: 17 (12/24/24 0817)  BP: (!) 112/59 (12/24/24 0815)  SpO2: 98 % (12/24/24 0815) Vital Signs (24h Range):  Temp:  [97.7 °F (36.5 °C)-98.7 °F (37.1 °C)] 97.7 °F (36.5 °C)  Pulse:  [] 65  Resp:  [16-18] 17  SpO2:  [95 %-100 %] 98 %  BP: (102-140)/(50-97) 112/59     Weight: 126.9 kg (279 lb 12.2 oz)  Body mass index is 45.18 kg/m².      Intake/Output Summary (Last 24 hours) at 12/24/2024 0857  Last data filed at 12/24/2024 0100  Gross per 24 hour   Intake 351.01 ml   Output 1509 ml   Net -1157.99 ml         Significant Labs:  Lab Results   Component Value Date    GROUPTRH O POS 12/23/2024    HEPBSAG Non-reactive 05/09/2024    STREPBCULT (A) 02/26/2020     STREPTOCOCCUS AGALACTIAE (GROUP B)  Beta-hemolytic streptococci are routinely susceptible to   penicillins,cephalosporins and carbapenems.       Recent Labs   Lab 12/24/24  0414   HGB 10.6*   HCT 30.1*       I have personallly reviewed all pertinent lab results from the last 24 hours.    Physical Exam:   Constitutional: She is oriented to person, place, and time. She appears well-developed and well-nourished.    HENT:   Head: Normocephalic and atraumatic.    Eyes: Conjunctivae are normal.     Cardiovascular:  Normal rate.             Pulmonary/Chest: Effort normal.        Abdominal: Soft. There is no abdominal tenderness.     Genitourinary: There is vaginal discharge (lochia) in the vagina.           Musculoskeletal: Normal range of motion.        Neurological: She is alert and oriented to person, place, and time.    Skin: Skin is warm and dry.    Psychiatric: She has a normal mood and affect. Her behavior is normal. Judgment and thought content normal.       Review of Systems   Constitutional:  Negative for chills and fever.   Eyes: Negative.  Negative for visual disturbance.   Respiratory: Negative.     Cardiovascular: Negative.    Gastrointestinal:  Positive for abdominal pain (cramping). Negative for nausea and vomiting.   Endocrine: Negative.    Genitourinary:  Positive for vaginal bleeding (lochia). Negative for vaginal odor.   Musculoskeletal: Negative.    Integumentary:  Negative.   Neurological:  Positive for headaches (spinal, relieved with PO meds).   Hematological: Negative.    Psychiatric/Behavioral: Negative.     Breast: negative.

## 2024-12-24 NOTE — PLAN OF CARE
VSS. Patient ambulating and voiding independently and without difficulty. Fundus firm without massage, midline, with light rubra noted. Pain controlled with PRN pain medications. Safety maintained, bed low and in a locked position. Formula feeding every 2-3 hours. No further concerns at this time, will continue to monitor.

## 2024-12-24 NOTE — NURSING
Pt. C/O 8/10 lower back pain and a HA unrelieved by Tylenol and IBU. Minor relief with Oxycodone 5mg. Anesthesia notified.

## 2024-12-24 NOTE — DISCHARGE SUMMARY
[facility] ([place])  Obstetrics  Discharge Summary      Patient Name: Frankie Garcia  MRN: [id number]  Admission Date: 2024  Hospital Length of Stay: 1 days  Discharge Date and Time:  2024 9:00 AM  Attending Physician: Noy Robles MD   Discharging Provider: Lesly Olivera CNM   Primary Care Provider: Jelani Nolan MD    HPI: No notes on file        * No surgery found *     Hospital Course:   24 PPD1 - Doing well today, normal lochia, pain controlled with PO meds. Formula feeding infant. Denies s/s of pre-E. C/o spinal headache relieved with PO meds. Anemia asymptomatic. Desires discharge home today.         Final Active Diagnoses:    Diagnosis Date Noted POA    PRINCIPAL PROBLEM:  S/p  (spontaneous vaginal delivery) [O80] 2024 Not Applicable    HSV-2 (herpes simplex virus 2) infection [B00.9] 10/21/2020 Yes    BMI 45.0-49.9, adult [Z68.42] 10/12/2020 Not Applicable    Positive GBS test [B95.1] 2020 Yes    KRIS (iron deficiency anemia) [D50.9] 2019 Yes      Problems Resolved During this Admission:    Diagnosis Date Noted Date Resolved POA    Encounter for induction of labor [Z34.90] 2024 Not Applicable        Significant Diagnostic Studies: Labs: CBC   Recent Labs   Lab 24  0100 24  0414   WBC 8.56 11.68   HGB 11.1* 10.6*   HCT 31.0* 30.1*    158         Feeding Method: bottle    Immunizations       Date Immunization Status Dose Route/Site Given by    24 1702 MMR Incomplete 0.5 mL Subcutaneous/     24 1702 Tdap Incomplete 0.5 mL Intramuscular/             Delivery:    Episiotomy: None   Lacerations: 1st   Repair suture:     Repair # of packets: 2   Blood loss (ml):       Birth information:  YOB: 2022   Time of birth: 3:18 PM   Sex: female   Delivery type: Vaginal, Spontaneous   Gestational Age: 39w1d     Measurements    Weight: 3240 g  Weight (lbs): 7 lb 2.3 oz  Length: 50.2 cm  Length (in):  "1975"  Head circumference: 35.6 cm  Chest circumference: 33.7 cm         Delivery Clinician: Delivery Providers    Delivering clinician: Noy Robles MD   Provider Role    Kisha Lloyd RN Griffiths, Lauren, RN Giraldo, Valentina, MD              Additional  information:  Forceps:    Vacuum:    Breech:    Observed anomalies      Living?:     Apgars    Living status: Living  Apgar Component Scores:  1 min.:  5 min.:  10 min.:  15 min.:  20 min.:    Skin color:  1  1       Heart rate:  2  2       Reflex irritability:  2  2       Muscle tone:  2  2       Respiratory effort:  2  2       Total:  9  9       Apgars assigned by: JESSI MOSS CLC         Placenta: Delivered:       appearance  Pending Diagnostic Studies:       None            Discharged Condition: stable    Disposition: Home or Self Care    Follow Up:   Follow-up Information       Noy Robles MD. Schedule an appointment as soon as possible for a visit in 6 week(s).    Specialty: Obstetrics and Gynecology  Why: Postpartum visit  Contact information:  [address] [place] [id number]  [phone]                           Patient Instructions:      Diet Adult Regular     Pelvic Rest     Notify your health care provider if you experience any of the following:  temperature >100.4     Notify your health care provider if you experience any of the following:  persistent nausea and vomiting or diarrhea     Notify your health care provider if you experience any of the following:  severe uncontrolled pain     Notify your health care provider if you experience any of the following:  redness, tenderness, or signs of infection (pain, swelling, redness, odor or green/yellow discharge around incision site)     Notify your health care provider if you experience any of the following:  difficulty breathing or increased cough     Notify your health care provider if you experience any of the following:  severe persistent headache "     Notify your health care provider if you experience any of the following:  worsening rash     Notify your health care provider if you experience any of the following:  persistent dizziness, light-headedness, or visual disturbances     Notify your health care provider if you experience any of the following:  increased confusion or weakness     Notify your health care provider if you experience any of the following:     Activity as tolerated     Medications:  Current Discharge Medication List        START taking these medications    Details   docusate sodium (COLACE) 100 MG capsule Take 2 capsules (200 mg total) by mouth 2 (two) times daily as needed for Constipation.  Qty: 60 capsule, Refills: 0      ferrous sulfate 325 (65 FE) MG EC tablet Take 1 tablet (325 mg total) by mouth once daily.  Qty: 30 tablet, Refills: 0      ibuprofen (ADVIL,MOTRIN) 600 MG tablet Take 1 tablet (600 mg total) by mouth every 6 (six) hours as needed for Pain.  Qty: 30 tablet, Refills: 0           CONTINUE these medications which have NOT CHANGED    Details   azelastine (ASTELIN) 137 mcg (0.1 %) nasal spray 1 spray (137 mcg total) by Nasal route every evening.  Qty: 30 mL, Refills: 0      cetirizine (ZYRTEC) 10 MG tablet Take 1 tablet (10 mg total) by mouth once daily.  Qty: 30 tablet, Refills: 0      doxylamine succinate 25 mg tablet Take 1 tablet (25 mg total) by mouth 2 (two) times a day.  Qty: 20 tablet, Refills: 0      famotidine (PEPCID) 40 MG tablet Take 1 tablet (40 mg total) by mouth nightly as needed for Heartburn.  Qty: 30 tablet, Refills: 3    Associated Diagnoses: Heartburn during pregnancy in second trimester      metoclopramide HCl (REGLAN) 10 MG tablet Take 1 tablet (10 mg total) by mouth every 6 (six) hours.  Qty: 30 tablet, Refills: 0    Associated Diagnoses: Nausea/vomiting in pregnancy      ondansetron (ZOFRAN) 4 MG tablet Take 1 tablet (4 mg total) by mouth daily as needed for Nausea.  Qty: 30 tablet, Refills: 0     Associated Diagnoses: Nausea/vomiting in pregnancy      prenatal vit no.124/iron/folic (PRENATAL VITAMIN ORAL) Take by mouth.      promethazine (PHENERGAN) 25 MG suppository Place 1 suppository (25 mg total) rectally every 6 (six) hours as needed for Nausea.  Qty: 10 suppository, Refills: 0      pyridoxine, vitamin B6, (B-6) 25 MG Tab Take 1 tablet (25 mg total) by mouth 2 (two) times a day.  Qty: 20 tablet, Refills: 0      valACYclovir (VALTREX) 500 MG tablet Take 1 tablet (500 mg total) by mouth 2 (two) times daily.  Qty: 60 tablet, Refills: 11           STOP taking these medications       aspirin (ECOTRIN) 81 MG EC tablet Comments:   Reason for Stopping:               Lesly Olivera CNM  Obstetrics  [facility] ([place])

## 2024-12-24 NOTE — HOSPITAL COURSE
12/24/24 PPD1 - Doing well today, normal lochia, pain controlled with PO meds. Formula feeding infant. Denies s/s of pre-E. C/o spinal headache relieved with PO meds. Anemia asymptomatic. Desires discharge home today.  12/25/24. PT doing well, has had spinal HA's, seen by Anesthesia, desires no blood patch and to be dc to home. Precautions reviewed. F/U 2 weeks and 6 weeks or PRN. Discussed pre e s/s, no s/s at this time. BP normal

## 2024-12-24 NOTE — ANESTHESIA POSTPROCEDURE EVALUATION
Anesthesia Post Evaluation    Patient: Frankie Garcia    Procedure(s) Performed: * No procedures listed *    Final Anesthesia Type: epidural      Patient location during evaluation: floor (Mother-Baby Unit)  Patient participation: Yes- Able to Participate  Level of consciousness: awake and alert  Post-procedure vital signs: reviewed and stable  Pain management: adequate  Airway patency: patent  PEYMAN mitigation strategies: Use of major conduction anesthesia (spinal/epidural) or peripheral nerve block and Multimodal analgesia  PONV status at discharge: No PONV  Anesthetic complications: no      Cardiovascular status: blood pressure returned to baseline and hemodynamically stable  Respiratory status: unassisted, spontaneous ventilation and room air  Hydration status: euvolemic  Follow-up not needed.  Comments: Fioricet helping with PDPH, explained conservative management vs blood patch. She wishes to continue conservative management at this time. She knows she can present to [facility] later this week at any time if she is discharged today for Anesthesia to evaluate her for epidural blood patch.              Vitals Value Taken Time   /72 12/24/24 0007   Temp 36.5 °C (97.7 °F) 12/24/24 0007   Pulse 72 12/24/24 0007   Resp 17 12/24/24 0302   SpO2 99 % 12/24/24 0007         No case tracking events are documented in the log.      Pain/Rosemarie Score: Pain Rating Prior to Med Admin: 5 (12/24/2024  5:18 AM)  Pain Rating Post Med Admin: 4 (12/24/2024  4:02 AM)

## 2024-12-25 VITALS
BODY MASS INDEX: 44.96 KG/M2 | WEIGHT: 279.75 LBS | HEART RATE: 62 BPM | OXYGEN SATURATION: 100 % | TEMPERATURE: 98 F | DIASTOLIC BLOOD PRESSURE: 56 MMHG | RESPIRATION RATE: 16 BRPM | SYSTOLIC BLOOD PRESSURE: 106 MMHG | HEIGHT: 66 IN

## 2024-12-25 PROCEDURE — 63600175 PHARM REV CODE 636 W HCPCS

## 2024-12-25 PROCEDURE — 25000003 PHARM REV CODE 250

## 2024-12-25 RX ORDER — OXYCODONE HYDROCHLORIDE 5 MG/1
5 TABLET ORAL EVERY 6 HOURS PRN
Qty: 5 TABLET | Refills: 0 | Status: SHIPPED | OUTPATIENT
Start: 2024-12-25

## 2024-12-25 RX ADMIN — OXYCODONE 5 MG: 5 TABLET ORAL at 04:12

## 2024-12-25 RX ADMIN — ENOXAPARIN SODIUM 40 MG: 40 INJECTION SUBCUTANEOUS at 08:12

## 2024-12-25 RX ADMIN — BUTALBITAL, ACETAMINOPHEN, AND CAFFEINE 1 TABLET: 325; 50; 40 TABLET ORAL at 02:12

## 2024-12-25 RX ADMIN — METHOCARBAMOL 500 MG: 500 TABLET ORAL at 08:12

## 2024-12-25 RX ADMIN — PRENATAL VIT W/ FE FUMARATE-FA TAB 27-0.8 MG 1 TABLET: 27-0.8 TAB at 08:12

## 2024-12-25 RX ADMIN — IBUPROFEN 600 MG: 600 TABLET, FILM COATED ORAL at 04:12

## 2024-12-25 RX ADMIN — FERROUS SULFATE TAB 325 MG (65 MG ELEMENTAL FE) 1 EACH: 325 (65 FE) TAB at 08:12

## 2024-12-25 RX ADMIN — OXYCODONE 5 MG: 5 TABLET ORAL at 09:12

## 2024-12-25 RX ADMIN — BUTALBITAL, ACETAMINOPHEN, AND CAFFEINE 1 TABLET: 325; 50; 40 TABLET ORAL at 06:12

## 2024-12-25 RX ADMIN — OXYCODONE 5 MG: 5 TABLET ORAL at 02:12

## 2024-12-25 RX ADMIN — IBUPROFEN 600 MG: 600 TABLET, FILM COATED ORAL at 01:12

## 2024-12-25 RX ADMIN — OXYCODONE HYDROCHLORIDE 10 MG: 10 TABLET ORAL at 12:12

## 2024-12-25 RX ADMIN — BUTALBITAL, ACETAMINOPHEN, AND CAFFEINE 1 TABLET: 325; 50; 40 TABLET ORAL at 11:12

## 2024-12-25 NOTE — DISCHARGE SUMMARY
[facility]  Obstetrics  Discharge Summary      Patient Name: Frankie Garcia  MRN: [id number]  Admission Date: 2024  Hospital Length of Stay: 2 days  Discharge Date and Time:  2024 10:01 AM  Attending Physician: Noy Robles MD   Discharging Provider: Riri Gibbons MD   Primary Care Provider: Jelani Nolan MD    HPI: No notes on file        * No surgery found *     Hospital Course:   24 PPD1 - Doing well today, normal lochia, pain controlled with PO meds. Formula feeding infant. Denies s/s of pre-E. C/o spinal headache relieved with PO meds. Anemia asymptomatic. Desires discharge home today.  24. PT doing well, has had spinal HA's, seen by Anesthesia, desires no blood patch and to be dc to home. Precautions reviewed. F/U 2 weeks and 6 weeks or PRN. Discussed pre e s/s, no s/s at this time. BP normal          Final Active Diagnoses:    Diagnosis Date Noted POA    PRINCIPAL PROBLEM:  S/p  (spontaneous vaginal delivery) [O80] 2024 Not Applicable    HSV-2 (herpes simplex virus 2) infection [B00.9] 10/21/2020 Yes    BMI 45.0-49.9, adult [Z68.42] 10/12/2020 Not Applicable    Positive GBS test [B95.1] 2020 Yes    KRIS (iron deficiency anemia) [D50.9] 2019 Yes      Problems Resolved During this Admission:    Diagnosis Date Noted Date Resolved POA    Encounter for induction of labor [Z34.90] 2024 Not Applicable        Significant Diagnostic Studies: Labs: All labs within the past 24 hours have been reviewed      Feeding Method: bottle    Immunizations       Date Immunization Status Dose Route/Site Given by    24 170 MMR Incomplete 0.5 mL Subcutaneous/     24 170 Tdap Incomplete 0.5 mL Intramuscular/             Delivery:    Episiotomy: None   Lacerations: 1st   Repair suture:     Repair # of packets: 2   Blood loss (ml):       Birth information:  YOB: 2022   Time of birth: 3:18 PM   Sex: female  DISCHARGE SUMMARY and INSTRUCTIONS:    You are being discharged undelivered because you and your baby are in stable condition.    STATUS NOTE:  Date:  3/22/2018  Time: 6:53 PM  Destination:  Home    ACTIVITY:  As tolerated    DIET:  · Continue to eat a healthy pregnancy diet  · Be sure to drink 8-10 glasses of water a day  · Don't go more than 8-10 hours without eating or drinking    CONTACT YOUR DOCTOR OR MIDWIFE IF YOU HAVE ANY OF THESE WARNING SIGNS OF PREGNANCY:  · Sudden and/or constant pain  · Vaginal bleeding  · Sudden gush or leaking fluid from the vagina  · Fainting  · Headache that will not go away with your normal comfort measures  · Any changes in your vision, such as blurry vision, double vision or spots/stars before your eyes  · Severe nausea and vomiting  · Little or no urine, pain and burning with urination, or blood in your urine  · Chills or fever  · Increase or change in vaginal discharge  · Your baby moves less than usual (See Fetal Movement Counting below.)      FETAL MOVEMENT COUNTING:  One way you can know your baby is doing well during pregnancy is to pay attention to his or her movements.  We recommend counting your baby's movements once each day beginning in the third trimester, or about the 26th week of pregnancy.      How to count baby's movements:  · Choose a time that is convenient for you when the baby is active, such as after a meal.  Try to do it at the same time each day.  · Sit comfortably or lie on your side.  · Note the time on the clock when you're ready to begin counting.  · Count each movement until the baby has moved 10 times.   · Count all movements that are either seen or felt, including shifting, rolling, or kicking.  Do not count baby's hiccoughs.  · If you have counted 10 movements in less than 2 hours, resume your normal activities and count again tomorrow.    If it takes longer than 1 hour to count 4 movements, try these suggestions:  · Eat something if you haven't  "  Delivery type: Vaginal, Spontaneous   Gestational Age: 39w1d     Measurements    Weight: 3240 g  Weight (lbs): 7 lb 2.3 oz  Length: 50.2 cm  Length (in): 19.75"  Head circumference: 35.6 cm  Chest circumference: 33.7 cm         Delivery Clinician: Delivery Providers    Delivering clinician: Noy Robles MD   Provider Role    Kisha Lloyd RN Griffiths, Lauren, RN Giraldo, Valentina, MD              Additional  information:  Forceps:    Vacuum:    Breech:    Observed anomalies      Living?:     Apgars    Living status: Living  Apgar Component Scores:  1 min.:  5 min.:  10 min.:  15 min.:  20 min.:    Skin color:  1  1       Heart rate:  2  2       Reflex irritability:  2  2       Muscle tone:  2  2       Respiratory effort:  2  2       Total:  9  9       Apgars assigned by: JESSI MOSS         Placenta: Delivered:       appearance  Pending Diagnostic Studies:       None            Discharged Condition: good    Disposition: Home or Self Care    Follow Up:   Follow-up Information       Noy Robles MD. Schedule an appointment as soon as possible for a visit in 6 week(s).    Specialty: Obstetrics and Gynecology  Why: Postpartum visit  Contact information:  [address] [id number] [phone]                           Patient Instructions:      Diet Adult Regular     Pelvic Rest     Notify your health care provider if you experience any of the following:  temperature >100.4     Notify your health care provider if you experience any of the following:  persistent nausea and vomiting or diarrhea     Notify your health care provider if you experience any of the following:  severe uncontrolled pain     Notify your health care provider if you experience any of the following:  redness, tenderness, or signs of infection (pain, swelling, redness, odor or green/yellow discharge around incision site)     Notify your health care provider if you experience any of the following:  " eaten within 2 to 3 hours before you count.  · Try to lie down in a quiet, undisturbed location, on either your right or left side.  · Place your hands on your belly and focus on your baby's movements.  · Continue your count from where you left off before, until you feel 10 movements.  · If it took longer than 2 hours to count 10 movements, call your midwife or doctor right away for recommendations.    Remember:  By counting and staying aware of your baby's movements, you will be helping your caregivers provide the best possible care for you and your baby.     Things to Start Planning for Your Delivery  · Who is your baby's doctor going to be?  If you don't already have a doctor picked out for your baby, this is something that you need to start thinking about before your baby is born.  · What to pack for the hospital  (Robe, slippers, toiletries, brush/comb, stress ball, change of clothes, phone numbers to make calls after baby is born. Change/money for vending machines, camera/video camera, copy of birth plan, nursing bras, baby book, car seat, baby outfit(s), baby blanket, snacks for support people)       · The Benefits of Breastfeeding brochure was given  to patient.     A copy of this form was provided to and reviewed with Nikole Madrigal by JOSÉ MIGUEL Chauhan, 3/22/2018.  Patient verbalized understanding and has no further questions at this time.       difficulty breathing or increased cough     Notify your health care provider if you experience any of the following:  severe persistent headache     Notify your health care provider if you experience any of the following:  worsening rash     Notify your health care provider if you experience any of the following:  persistent dizziness, light-headedness, or visual disturbances     Notify your health care provider if you experience any of the following:  increased confusion or weakness     Notify your health care provider if you experience any of the following:     Activity as tolerated     Medications:  Current Discharge Medication List        START taking these medications    Details   butalbital-acetaminophen-caffeine -40 mg (FIORICET, ESGIC) -40 mg per tablet Take 1 tablet by mouth every 4 (four) hours as needed for Pain.  Qty: 30 tablet, Refills: 0      docusate sodium (COLACE) 100 MG capsule Take 2 capsules (200 mg total) by mouth 2 (two) times daily as needed for Constipation.  Qty: 60 capsule, Refills: 0      ferrous sulfate 325 (65 FE) MG EC tablet Take 1 tablet (325 mg total) by mouth once daily.  Qty: 30 tablet, Refills: 0      ibuprofen (ADVIL,MOTRIN) 600 MG tablet Take 1 tablet (600 mg total) by mouth every 6 (six) hours as needed for Pain.  Qty: 30 tablet, Refills: 0      methocarbamoL (ROBAXIN) 500 MG Tab Take 1 tablet (500 mg total) by mouth 4 (four) times daily as needed.  Qty: 30 tablet, Refills: 0      oxyCODONE (ROXICODONE) 5 MG immediate release tablet Take 1 tablet (5 mg total) by mouth every 6 (six) hours as needed for Pain.  Qty: 5 tablet, Refills: 0    Comments: Quantity prescribed more than 7 day supply? No  Associated Diagnoses:  (spontaneous vaginal delivery)           CONTINUE these medications which have NOT CHANGED    Details   azelastine (ASTELIN) 137 mcg (0.1 %) nasal spray 1 spray (137 mcg total) by Nasal route every evening.  Qty: 30 mL, Refills: 0      cetirizine  (ZYRTEC) 10 MG tablet Take 1 tablet (10 mg total) by mouth once daily.  Qty: 30 tablet, Refills: 0      famotidine (PEPCID) 40 MG tablet Take 1 tablet (40 mg total) by mouth nightly as needed for Heartburn.  Qty: 30 tablet, Refills: 3    Associated Diagnoses: Heartburn during pregnancy in second trimester      prenatal vit no.124/iron/folic (PRENATAL VITAMIN ORAL) Take by mouth.           STOP taking these medications       aspirin (ECOTRIN) 81 MG EC tablet Comments:   Reason for Stopping:         doxylamine succinate 25 mg tablet Comments:   Reason for Stopping:         metoclopramide HCl (REGLAN) 10 MG tablet Comments:   Reason for Stopping:         ondansetron (ZOFRAN) 4 MG tablet Comments:   Reason for Stopping:         promethazine (PHENERGAN) 25 MG suppository Comments:   Reason for Stopping:         pyridoxine, vitamin B6, (B-6) 25 MG Tab Comments:   Reason for Stopping:         valACYclovir (VALTREX) 500 MG tablet Comments:   Reason for Stopping:             Kayli Hernández CNM    I discussed this patient with the CNM and resident, have reviewed the above documentation and concur with the assessment and plan for this patient.    BULL Gibbons MD

## 2024-12-25 NOTE — PLAN OF CARE
Pt VSS. Pain controlled with scheduled and PRN oral pain medication. Formula feeding. Fundus firm and midline with light lochia rubra. Voiding spontaneously with adequate output. Passing gas. DCT done. Pt to be discharged home.    Problem: Adult Inpatient Plan of Care  Goal: Plan of Care Review  Outcome: Met  Goal: Patient-Specific Goal (Individualized)  Outcome: Met  Goal: Absence of Hospital-Acquired Illness or Injury  Outcome: Met  Goal: Optimal Comfort and Wellbeing  Outcome: Met  Goal: Readiness for Transition of Care  Outcome: Met     Problem: Bariatric Environmental Safety  Goal: Safety Maintained with Care  Outcome: Met     Problem:  Fall Injury Risk  Goal: Absence of Fall, Infant Drop and Related Injury  Outcome: Met     Problem: Infection  Goal: Absence of Infection Signs and Symptoms  Outcome: Met     Problem: Labor  Goal: Hemostasis  Outcome: Met  Goal: Stable Fetal Wellbeing  Outcome: Met  Goal: Effective Progression to Delivery  Outcome: Met  Goal: Absence of Infection Signs and Symptoms  Outcome: Met  Goal: Acceptable Pain Control  Outcome: Met  Goal: Normal Uterine Contraction Pattern  Outcome: Met     Problem: Anesthesia/Analgesia, Neuraxial  Goal: Safe, Effective Infusion Delivery  Outcome: Met  Goal: Stable Patient-Fetal Status  Outcome: Met  Goal: Absence of Infection Signs and Symptoms  Outcome: Met  Goal: Nausea and Vomiting Relief  Outcome: Met  Goal: Effective Pain Control  Outcome: Met  Goal: Effective Oxygenation and Ventilation  Outcome: Met  Goal: Baseline Motor Function Return  Outcome: Met  Goal: Effective Urinary Elimination  Outcome: Met

## 2024-12-25 NOTE — PROGRESS NOTES
[facility])  Obstetrics  Postpartum Progress Note    Patient Name: Frankie Garcia  MRN: [id number]  Admission Date: 2024  Hospital Length of Stay: 2 days  Attending Physician: Noy Robles MD  Primary Care Provider: Jelani Nolan MD    Subjective:     Principal Problem: (spontaneous vaginal delivery)    Hospital Course:  24 PPD1 - Doing well today, normal lochia, pain controlled with PO meds. Formula feeding infant. Denies s/s of pre-E. C/o spinal headache relieved with PO meds. Anemia asymptomatic. Desires discharge home today.  24. PT doing well, has had spinal HA's, seen by Anesthesia, desires no blood patch and to be dc to home. Precautions reviewed. F/U 2 weeks and 6 weeks or PRN. Discussed pre e s/s, no s/s at this time. BP normal     Interval History:     She is doing well this morning. She is tolerating a regular diet without nausea or vomiting. She is voiding spontaneously. She is ambulating. She has passed flatus, and has not a BM. Vaginal bleeding is mild. She denies fever or chills. Abdominal pain is mild and controlled with oral medications. She Is not breastfeeding.   Objective:     Vital Signs (Most Recent):  Temp: 97.7 °F (36.5 °C) (24 0843)  Pulse: 62 (24 0843)  Resp: 16 (24 0945)  BP: (!) 106/56 (24 0843)  SpO2: 100 % (24 0843) Vital Signs (24h Range):  Temp:  [97.7 °F (36.5 °C)-97.8 °F (36.6 °C)] 97.7 °F (36.5 °C)  Pulse:  [60-62] 62  Resp:  [16-19] 16  SpO2:  [96 %-100 %] 100 %  BP: (101-115)/(56-59) 106/56     Weight: 126.9 kg (279 lb 12.2 oz)  Body mass index is 45.18 kg/m².    No intake or output data in the 24 hours ending 24 1000      Significant Labs:  Lab Results   Component Value Date    [facility] POS 2024    HEPBSAG Non-reactive 2024    STREPBCULT (A) 2020     STREPTOCOCCUS AGALACTIAE (GROUP B)  Beta-hemolytic streptococci are routinely susceptible to   penicillins,cephalosporins and  carbapenems.       Recent Labs   Lab 24  0414   HGB 10.6*   HCT 30.1*       I have personallly reviewed all pertinent lab results from the last 24 hours.    Physical Exam:   Constitutional: She is oriented to person, place, and time. She appears well-developed and well-nourished.    HENT:   Head: Normocephalic.    Eyes: Pupils are equal, round, and reactive to light.     Cardiovascular:  Normal rate.             Pulmonary/Chest: Effort normal.        Abdominal: Soft.     Genitourinary:    Vagina and uterus normal.             Musculoskeletal: Normal range of motion.       Neurological: She is alert and oriented to person, place, and time.    Skin: Skin is warm and dry.    Psychiatric: She has a normal mood and affect. Her behavior is normal. Judgment and thought content normal.       Review of Systems   All other systems reviewed and are negative.    Assessment/Plan:     29 y.o. female  for:    * S/p  (spontaneous vaginal delivery)  24 PPD1 - Doing well today, normal lochia, pain controlled with PO meds. Formula feeding infant. Denies s/s of pre-E. C/o spinal headache relieved with PO meds. Anemia asymptomatic. Desires discharge home today.    KRIS (iron deficiency anemia)  Asymptomatic        Disposition: As patient meets milestones, will plan to discharge today.    Kayli Hernández CNM  Obstetrics  [facility]

## 2024-12-25 NOTE — SUBJECTIVE & OBJECTIVE
Interval History:     She is doing well this morning. She is tolerating a regular diet without nausea or vomiting. She is voiding spontaneously. She is ambulating. She has passed flatus, and has not a BM. Vaginal bleeding is mild. She denies fever or chills. Abdominal pain is mild and controlled with oral medications. She Is not breastfeeding.   Objective:     Vital Signs (Most Recent):  Temp: 97.7 °F (36.5 °C) (12/25/24 0843)  Pulse: 62 (12/25/24 0843)  Resp: 16 (12/25/24 0945)  BP: (!) 106/56 (12/25/24 0843)  SpO2: 100 % (12/25/24 0843) Vital Signs (24h Range):  Temp:  [97.7 °F (36.5 °C)-97.8 °F (36.6 °C)] 97.7 °F (36.5 °C)  Pulse:  [60-62] 62  Resp:  [16-19] 16  SpO2:  [96 %-100 %] 100 %  BP: (101-115)/(56-59) 106/56     Weight: 126.9 kg (279 lb 12.2 oz)  Body mass index is 45.18 kg/m².    No intake or output data in the 24 hours ending 12/25/24 1000      Significant Labs:  Lab Results   Component Value Date    GROUPTRH O POS 12/23/2024    HEPBSAG Non-reactive 05/09/2024    STREPBCULT (A) 02/26/2020     STREPTOCOCCUS AGALACTIAE (GROUP B)  Beta-hemolytic streptococci are routinely susceptible to   penicillins,cephalosporins and carbapenems.       Recent Labs   Lab 12/24/24  0414   HGB 10.6*   HCT 30.1*       I have personallly reviewed all pertinent lab results from the last 24 hours.    Physical Exam:   Constitutional: She is oriented to person, place, and time. She appears well-developed and well-nourished.    HENT:   Head: Normocephalic.    Eyes: Pupils are equal, round, and reactive to light.     Cardiovascular:  Normal rate.             Pulmonary/Chest: Effort normal.        Abdominal: Soft.     Genitourinary:    Vagina and uterus normal.             Musculoskeletal: Normal range of motion.       Neurological: She is alert and oriented to person, place, and time.    Skin: Skin is warm and dry.    Psychiatric: She has a normal mood and affect. Her behavior is normal. Judgment and thought content normal.        Review of Systems   All other systems reviewed and are negative.

## 2024-12-25 NOTE — PROGRESS NOTES
"POSTPARTUM PROGRESS NOTE    Subjective:     PPD/POD#: 2   Procedure:    EGA: 39w1d   N/V: No   F/C: No   Abd Pain: Mild, well-controlled with oral pain medication   Lochia: Mild   Voiding: Yes   Ambulating: Yes   Bowel fnc: Yes   Contraception: Interval IUD     Objective:      Temp:  [97.7 °F (36.5 °C)-97.8 °F (36.6 °C)] 97.7 °F (36.5 °C)  Pulse:  [60-62] 62  Resp:  [16-19] 16  SpO2:  [96 %-100 %] 100 %  BP: (101-115)/(56-59) 106/56    Abdomen: Soft, appropriately tender   Uterus: Firm, no fundal tenderness   Incision: N/A     Lab Review    No results for input(s): "NA", "K", "CL", "CO2", "BUN", "CREATININE", "GLU", "PROT", "BILITOT", "ALKPHOS", "ALT", "AST", "MG", "PHOS" in the last 168 hours.    Recent Labs   Lab 24  0100 24  0414   WBC 8.56 11.68   HGB 11.1* 10.6*   HCT 31.0* 30.1*   MCV 88 88    158     I/O  No intake or output data in the 24 hours ending 24 0955    Assessment and Plan:   Postpartum care:  - Patient doing well.  - Continue routine management and advances.  - Complaining of spinal HA/back pain. Fioricet helping with HA. Declining blood patch. Discussed that she may present to [facility] if HA worsens for eval/blood patch.     Obesity  - PrePreg BMI 43  - Encourage ambulation  - Lovenox: 40 mg BID    Anemia   - H/H as above  - QBL: 100 cc  - asymptomatic  - iron/colace    Jade Gomes MD  Obstetrics & Gynecology, PGY-1   "

## 2024-12-26 ENCOUNTER — PATIENT MESSAGE (OUTPATIENT)
Dept: OBSTETRICS AND GYNECOLOGY | Facility: OTHER | Age: 29
End: 2024-12-26
Payer: COMMERCIAL

## 2024-12-28 ENCOUNTER — HOSPITAL ENCOUNTER (EMERGENCY)
Facility: OTHER | Age: 29
Discharge: HOME | End: 2024-12-28
Attending: OBSTETRICS & GYNECOLOGY
Payer: COMMERCIAL

## 2024-12-28 VITALS
SYSTOLIC BLOOD PRESSURE: 128 MMHG | HEART RATE: 63 BPM | DIASTOLIC BLOOD PRESSURE: 78 MMHG | TEMPERATURE: 98 F | RESPIRATION RATE: 17 BRPM | OXYGEN SATURATION: 99 %

## 2024-12-28 DIAGNOSIS — M54.9 BACK PAIN, UNSPECIFIED BACK LOCATION, UNSPECIFIED BACK PAIN LATERALITY, UNSPECIFIED CHRONICITY: Primary | ICD-10-CM

## 2024-12-28 PROCEDURE — 99284 EMERGENCY DEPT VISIT MOD MDM: CPT | Mod: 25

## 2024-12-28 PROCEDURE — 96372 THER/PROPH/DIAG INJ SC/IM: CPT | Performed by: OBSTETRICS & GYNECOLOGY

## 2024-12-28 PROCEDURE — 99284 EMERGENCY DEPT VISIT MOD MDM: CPT | Mod: 24,,, | Performed by: OBSTETRICS & GYNECOLOGY

## 2024-12-28 PROCEDURE — 63600175 PHARM REV CODE 636 W HCPCS: Performed by: OBSTETRICS & GYNECOLOGY

## 2024-12-28 RX ORDER — KETOROLAC TROMETHAMINE 10 MG/1
10 TABLET, FILM COATED ORAL EVERY 6 HOURS
Qty: 20 TABLET | Refills: 0 | Status: SHIPPED | OUTPATIENT
Start: 2024-12-28 | End: 2024-12-28 | Stop reason: CLARIF

## 2024-12-28 RX ORDER — KETOROLAC TROMETHAMINE 30 MG/ML
30 INJECTION, SOLUTION INTRAMUSCULAR; INTRAVENOUS ONCE
Status: COMPLETED | OUTPATIENT
Start: 2024-12-28 | End: 2024-12-28

## 2024-12-28 RX ORDER — KETOROLAC TROMETHAMINE 10 MG/1
10 TABLET, FILM COATED ORAL EVERY 6 HOURS
Qty: 20 TABLET | Refills: 0 | Status: SHIPPED | OUTPATIENT
Start: 2024-12-28 | End: 2025-01-02

## 2024-12-28 RX ADMIN — KETOROLAC TROMETHAMINE 30 MG: 30 INJECTION, SOLUTION INTRAMUSCULAR; INTRAVENOUS at 04:12

## 2024-12-28 NOTE — ED PROVIDER NOTES
"Encounter Date: 2024       History   No chief complaint on file.    Frankie Garcia is a 29 y.o.  who is PPD#5 s/p  who presents to the OB ED today (2024) with CC of headache and back pain.    Patient reports headache and spasm-like back pain. Symptoms are not relieved by tylenol, ibuprofen and flexeril that she was discharged with. She notes having a particularly difficult epidural that had to be adjusted/re-dosed. Pain is worse with movement as she feels a "pulling" sensation in her back,     Post-partum she did have a headache and back pain while on MBU. She was evaluated by anesthesia and offered a blood patch which she denied at that time.     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             Review of patient's allergies indicates:   Allergen Reactions    Boostrix tdap [diphth,pertus(acell),tetanus] Swelling and Rash    Ciprofloxacin Hives    Sulfamethoxazole-trimethoprim Hives    Doxycycline      Swelling and rash to face    Prednisone (bulk) Hives, Itching, Palpitations and Rash     Past Medical History:   Diagnosis Date    Anemia     Influenza 2019    Pyelonephritis 2019    sepsis    Seasonal allergic rhinitis 2021     Past Surgical History:   Procedure Laterality Date    NO PAST SURGERIES  2019     Family History   Problem Relation Name Age of Onset    No Known Problems Mother      Arthritis Father Andrew Garcia     Thyroid disease Father Andrew Garcia     No Known Problems Sister      No Known Problems Brother      Diabetes Maternal Aunt B     Thyroid disease Maternal Grandmother      Arthritis Maternal Grandfather      Thyroid disease Paternal Grandmother      Breast cancer Neg Hx      Colon cancer Neg Hx      Ovarian cancer Neg Hx      Melanoma Neg Hx      Esophageal cancer Neg Hx       Social History     Tobacco Use    Smoking status: Former     Current packs/day: 0.00     Average packs/day: 0.1 packs/day for 2.0 years (0.2 ttl pk-yrs)     Types: " Cigarettes     Start date: 2017     Quit date: 2019     Years since quittin.9    Smokeless tobacco: Never   Substance Use Topics    Alcohol use: Not Currently     Comment: socially, monthly  - 2 drinks per month    Drug use: No     Review of Systems   Constitutional:  Negative for fever.   HENT:  Negative for sore throat.    Respiratory:  Negative for shortness of breath.    Cardiovascular:  Negative for chest pain.   Gastrointestinal:  Negative for nausea.   Genitourinary:  Negative for dysuria.   Musculoskeletal:  Positive for back pain.   Skin:  Negative for rash.   Neurological:  Negative for weakness.   Hematological:  Does not bruise/bleed easily.       Physical Exam     Initial Vitals [24 1530]   BP Pulse Resp Temp SpO2   128/78 63 17 97.9 °F (36.6 °C) 99 %      MAP       --         Physical Exam    Vitals reviewed.  Constitutional: She appears well-developed and well-nourished.   HENT:   Head: Normocephalic and atraumatic.   Eyes: EOM are normal.   Neck:   Normal range of motion.  Cardiovascular:  Normal rate.           Pulmonary/Chest: No respiratory distress.   Abdominal: Abdomen is soft. There is no abdominal tenderness.   Musculoskeletal:         General: Normal range of motion.      Cervical back: Normal range of motion.     Neurological: She is oriented to person, place, and time.   Skin: Skin is warm and dry.   Psychiatric: She has a normal mood and affect.         ED Course   Procedures  Labs Reviewed - No data to display       Imaging Results    None          Medications   ketorolac injection 30 mg (30 mg Intramuscular Given 24 1602)     Medical Decision Making  Frankie Garcia is a 29 y.o.  who is PPD#5 s/p  who presents to the OB ED today (2024) with CC of headache and back spasms.    Temp:  [97.9 °F (36.6 °C)] 97.9 °F (36.6 °C)  Pulse:  [63] 63  Resp:  [17] 17  SpO2:  [99 %] 99 %  BP: (128)/(78) 128/78    Headache and Back Pain  - Headache and spasm-like  back pain not relieved by tylenol, ibuprofen and flexeril  - Symptoms continuous through post-partum period  - No new injury. No fevers/chills.   - Improved with IM Toradol  - Evaluated by anesthesia who offered blood patch > patient declined  - After Toradol and evaluation, patient noting great improvement in symptoms and would like to go home  - Patient stable for discharge at this time  - ED return precautions reviewed  - She voiced understanding and is in agreement with the plan    Rika Alvarez MD  OBGYN   PGY-1    Risk  Prescription drug management.              Attending Attestation:   Physician Attestation Statement for Resident:  As the supervising MD   Physician Attestation Statement: I have personally seen and examined this patient.   I agree with the above history.  -:   As the supervising MD I agree with the above PE.     As the supervising MD I agree with the above treatment, course, plan, and disposition.   -:     See ED course.  Pain improved with IM toradol.  Anesthesia consult, patient declined blood patch.  Agree with discharge to home.    Anne Alvares MD,DILCIA  Date and Time: 2024 7:55 PM  OB Hospitalist    I was personally present during the critical portions of the procedure(s) performed by the resident and was immediately available in the ED to provide services and assistance as needed during the entire procedure.   I have reviewed the following: old records at this facility.                ED Course as of 24   Sat Dec 28, 2024   1543 Patient is a 29 y.o.  s/p  presents with HA and back spasms.  Patient reports having a spinal HA during admission but declined a blood patch at that time.  Patient is able to sit up but is having difficulty getting up from the bed due to a pulling sensation.  Anesthesia called to evaluate.  Toradol 30mg IM ordered. [CD]      ED Course User Index  [CD] Anne Alvares MD                           Clinical  Impression:  Final diagnoses:  [M54.9] Back pain, unspecified back location, unspecified back pain laterality, unspecified chronicity (Primary)  [O89.9] Postpartum complication of anesthesia          ED Disposition Condition    Discharge Stable          ED Prescriptions       Medication Sig Dispense Start Date End Date Auth. Provider    ketorolac (TORADOL) 10 mg tablet  (Status: Discontinued) Take 1 tablet (10 mg total) by mouth every 6 (six) hours. Do not take toradol with ibuprofen for 5 days 20 tablet 12/28/2024 12/28/2024 Rika Alvarez MD    ketorolac (TORADOL) 10 mg tablet Take 1 tablet (10 mg total) by mouth every 6 (six) hours. for 5 days 20 tablet 12/28/2024 1/2/2025 Rika Alvarez MD          Follow-up Information    None          Rika Alvarez MD  Resident  12/28/24 1817       Anne Alvares MD  12/28/24 1956

## 2024-12-28 NOTE — Clinical Note
"    [address]  [facility] [address]  Phone: [phone]      Return to School    Frankie Garcia (Alexis) was seen and treated in our emergency department on 12/28/2024.     COVID-19 is present in our communities across the [facility]. There is limited testing for COVID at this time, so not all patients can be tested. In this situation, your employee meets the following criteria:    Frankie Garcia has met the criteria for COVID-19 testing and has a POSITIVE result. She can return to school once they are asymptomatic for 24 hours without the use of fever reducing medications AND at least five days from the first positive result. A mask is recommended for 5 days post quarantine.     If you have any questions or concerns, or if I can be of further assistance, please do not hesitate to contact me.    Sincerely,         "

## 2025-01-03 ENCOUNTER — TELEPHONE (OUTPATIENT)
Dept: OBSTETRICS AND GYNECOLOGY | Facility: CLINIC | Age: 30
End: 2025-01-03
Payer: COMMERCIAL

## 2025-01-03 DIAGNOSIS — Z30.014 ENCOUNTER FOR INITIAL PRESCRIPTION OF INTRAUTERINE CONTRACEPTIVE DEVICE (IUD): Primary | ICD-10-CM

## 2025-01-03 NOTE — TELEPHONE ENCOUNTER
Left message to patient to call the office at [phone] to schedule postpartum and IUD insertion.

## 2025-01-06 ENCOUNTER — TELEPHONE (OUTPATIENT)
Dept: OBSTETRICS AND GYNECOLOGY | Facility: CLINIC | Age: 30
End: 2025-01-06
Payer: COMMERCIAL

## 2025-01-06 NOTE — TELEPHONE ENCOUNTER
----- Message from [employer] sent at 1/6/2025 12:36 PM CST -----      Name of Who is Calling: ELEUTERIO MOMIN [[id number]]      What is the request in detail: Pt returned call to schedule PP per schedule first available came up for 3/27 which is too far.Please contact to further discuss and advise.          Can the clinic reply by [facility]: Y      What Number to [facility] Back [facility]:  [phone]

## 2025-01-11 ENCOUNTER — PATIENT MESSAGE (OUTPATIENT)
Dept: OBSTETRICS AND GYNECOLOGY | Facility: CLINIC | Age: 30
End: 2025-01-11
Payer: COMMERCIAL

## 2025-01-20 NOTE — PATIENT INSTRUCTIONS
Hydrate well with 8-12 glasses of water  Start fiber supplementation, ie metamucil with goal of 20-30 grams per day  Increase dietary fiber ie fruits that start with the letter P  Take IBgard as needed   Take miralax once or twice daily as needed  Minimize excedrin use  Lab work and stool tests today  CT scan ordered by PCP   Pt c/o body aches, nasal congestion and chest pain with cough x 2 weeks, pt states she came from [facility] 3 weeks ago, pt to er with steady gait, pt eating breakfast, skin w/d/pink, 0 distress. 0 cough noted at this time.

## 2025-02-05 ENCOUNTER — PATIENT MESSAGE (OUTPATIENT)
Dept: OBSTETRICS AND GYNECOLOGY | Facility: CLINIC | Age: 30
End: 2025-02-05
Payer: COMMERCIAL

## 2025-02-11 ENCOUNTER — PATIENT MESSAGE (OUTPATIENT)
Dept: OBSTETRICS AND GYNECOLOGY | Facility: CLINIC | Age: 30
End: 2025-02-11

## 2025-02-11 ENCOUNTER — OFFICE VISIT (OUTPATIENT)
Dept: OBSTETRICS AND GYNECOLOGY | Facility: CLINIC | Age: 30
End: 2025-02-11
Payer: COMMERCIAL

## 2025-02-11 VITALS
WEIGHT: 264.31 LBS | HEIGHT: 66 IN | DIASTOLIC BLOOD PRESSURE: 78 MMHG | BODY MASS INDEX: 42.48 KG/M2 | SYSTOLIC BLOOD PRESSURE: 118 MMHG

## 2025-02-11 DIAGNOSIS — N89.8 VAGINAL ODOR: ICD-10-CM

## 2025-02-11 DIAGNOSIS — B37.9 ANTIBIOTIC-INDUCED YEAST INFECTION: ICD-10-CM

## 2025-02-11 DIAGNOSIS — N39.46 MIXED INCONTINENCE URGE AND STRESS: ICD-10-CM

## 2025-02-11 DIAGNOSIS — T36.95XA ANTIBIOTIC-INDUCED YEAST INFECTION: ICD-10-CM

## 2025-02-11 DIAGNOSIS — N76.0 ACUTE VAGINITIS: ICD-10-CM

## 2025-02-11 DIAGNOSIS — R10.2 PELVIC PAIN: Primary | ICD-10-CM

## 2025-02-11 DIAGNOSIS — R11.0 NAUSEA: ICD-10-CM

## 2025-02-11 DIAGNOSIS — N39.0 ACUTE URINARY TRACT INFECTION: Primary | ICD-10-CM

## 2025-02-11 LAB
B-HCG UR QL: NEGATIVE
BACTERIA #/AREA URNS AUTO: ABNORMAL /HPF
BILIRUB UR QL STRIP: NEGATIVE
CLARITY UR REFRACT.AUTO: ABNORMAL
COLOR UR AUTO: YELLOW
CTP QC/QA: YES
GLUCOSE UR QL STRIP: NEGATIVE
HGB UR QL STRIP: ABNORMAL
KETONES UR QL STRIP: NEGATIVE
LEUKOCYTE ESTERASE UR QL STRIP: ABNORMAL
MICROSCOPIC COMMENT: ABNORMAL
NITRITE UR QL STRIP: NEGATIVE
PH UR STRIP: 8 [PH] (ref 5–8)
PROT UR QL STRIP: ABNORMAL
RBC #/AREA URNS AUTO: 1 /HPF (ref 0–4)
SP GR UR STRIP: 1.02 (ref 1–1.03)
SQUAMOUS #/AREA URNS AUTO: 10 /HPF
URN SPEC COLLECT METH UR: ABNORMAL
WBC #/AREA URNS AUTO: 41 /HPF (ref 0–5)

## 2025-02-11 PROCEDURE — 99999 PR PBB SHADOW E&M-EST. PATIENT-LVL III: CPT | Mod: PBBFAC,,,

## 2025-02-11 PROCEDURE — 81515 NFCT DS BV&VAGINITIS DNA ALG: CPT

## 2025-02-11 PROCEDURE — 87086 URINE CULTURE/COLONY COUNT: CPT

## 2025-02-11 PROCEDURE — 81001 URINALYSIS AUTO W/SCOPE: CPT

## 2025-02-11 RX ORDER — METRONIDAZOLE 500 MG/1
500 TABLET ORAL EVERY 12 HOURS
Qty: 14 TABLET | Refills: 0 | Status: SHIPPED | OUTPATIENT
Start: 2025-02-11 | End: 2025-02-18

## 2025-02-11 RX ORDER — NITROFURANTOIN 25; 75 MG/1; MG/1
100 CAPSULE ORAL 2 TIMES DAILY
Qty: 14 CAPSULE | Refills: 0 | Status: SHIPPED | OUTPATIENT
Start: 2025-02-11 | End: 2025-02-18

## 2025-02-11 RX ORDER — FLUCONAZOLE 150 MG/1
150 TABLET ORAL ONCE
Qty: 1 TABLET | Refills: 0 | Status: SHIPPED | OUTPATIENT
Start: 2025-02-11 | End: 2025-02-11

## 2025-02-11 NOTE — PROGRESS NOTES
"Chief Complaint: Pelvic Pain    HPI:   Frankie Garcia is a 29 y.o.  here for evaluation of pelvic pain in the LRQ. Sharp pain that radiated to her back. Started this morning and has since resolved. Lasted for approximately 2 hours. Feels lingering aching/soreness in the area. Pain caused multiple episodes of vomiting. Having vaginal odor and clear vaginal discharge for 1 week. Denies vaginal itching or beleding. Reports urinary incontinence since pregnancy that has since worsened. Reports urinary frequency, urgency, and hesitancy. Denies burning with urination. SA with one male partner. Does not use condoms. Declines STI screening. Has had unprotected intercourse a few times since delivery. She is not breastfeeding and not currently taking anything for contraception. She has an appt to get an IUD next week with Dr. Robles.  Had the flu last week and has continued to have diarrhea for the past 4-5 days. She is concerned that the pain is a UTI or kidney stone. She has a history of recurrent UTIs and pyelonephritis. This is the extent of the patient's complaints at this time.     /78 (BP Location: Right arm, Patient Position: Sitting)   Ht 5' 6" (1.676 m)   Wt 119.9 kg (264 lb 5.3 oz)   LMP 2025 (Approximate)   Breastfeeding No   BMI 42.66 kg/m²     Past Medical History:   Diagnosis Date    Anemia     Influenza 2019    Pyelonephritis 2019    sepsis    Seasonal allergic rhinitis 2021       Past Surgical History:   Procedure Laterality Date    NO PAST SURGERIES  2019       Family History   Problem Relation Name Age of Onset    No Known Problems Mother      Arthritis Father Andrew Garcia     Thyroid disease Father Andrew Garcia     No Known Problems Sister      No Known Problems Brother      Diabetes Maternal Aunt B     Thyroid disease Maternal Grandmother      Arthritis Maternal Grandfather      Thyroid disease Paternal Grandmother      Breast cancer Neg Hx      Colon cancer " Neg Hx      Ovarian cancer Neg Hx      Melanoma Neg Hx      Esophageal cancer Neg Hx         Social History     Socioeconomic History    Marital status: Single    Number of children: 1   Tobacco Use    Smoking status: Former     Current packs/day: 0.00     Average packs/day: 0.1 packs/day for 2.0 years (0.2 ttl pk-yrs)     Types: Cigarettes     Start date: 2017     Quit date: 2019     Years since quittin.0     Passive exposure: Past    Smokeless tobacco: Never   Substance and Sexual Activity    Alcohol use: Not Currently     Comment: socially, monthly  - 2 drinks per month    Drug use: No    Sexual activity: Yes     Partners: Female, Male     Birth control/protection: None   Other Topics Concern    Are you pregnant or think you may be? Yes     Comment: 31 weeks pregnant almost 32    Breast-feeding No     Social Drivers of Health     Financial Resource Strain: Low Risk  (2024)    Overall Financial Resource Strain (CARDIA)     Difficulty of Paying Living Expenses: Not very hard   Food Insecurity: No Food Insecurity (2024)    Hunger Vital Sign     Worried About Running Out of Food in the Last Year: Never true     Ran Out of Food in the Last Year: Never true   Transportation Needs: No Transportation Needs (2024)    TRANSPORTATION NEEDS     Transportation : No   Physical Activity: Insufficiently Active (2023)    Exercise Vital Sign     Days of Exercise per Week: 3 days     Minutes of Exercise per Session: 20 min   Stress: No Stress Concern Present (2024)    [employer] - Occupational Stress Questionnaire     Feeling of Stress : Not at all   Housing Stability: Unknown (2024)    Housing Stability Vital Sign     Unable to Pay for Housing in the Last Year: No     Homeless in the Last Year: No       Current Outpatient Medications   Medication Sig Dispense Refill    azelastine (ASTELIN) 137 mcg (0.1 %) nasal spray 1 spray (137 mcg total) by Nasal  route every evening. 30 mL 0    cetirizine (ZYRTEC) 10 MG tablet Take 1 tablet (10 mg total) by mouth once daily. 30 tablet 0    docusate sodium (COLACE) 100 MG capsule Take 2 capsules (200 mg total) by mouth 2 (two) times daily as needed for Constipation. 60 capsule 0    famotidine (PEPCID) 40 MG tablet Take 1 tablet (40 mg total) by mouth nightly as needed for Heartburn. 30 tablet 3    ferrous sulfate 325 (65 FE) MG EC tablet Take 1 tablet (325 mg total) by mouth once daily. 30 tablet 0    fluconazole (DIFLUCAN) 150 MG Tab Take 1 tablet (150 mg total) by mouth once. Take at the end of the flagyl if needed to treat/prevent a yeast infection (thick, clumpy discharge and/or itching for 1 dose 1 tablet 0    ibuprofen (ADVIL,MOTRIN) 600 MG tablet Take 1 tablet (600 mg total) by mouth every 6 (six) hours as needed for Pain. 30 tablet 0    metroNIDAZOLE (FLAGYL) 500 MG tablet Take 1 tablet (500 mg total) by mouth every 12 (twelve) hours. Eat when taking, do not drink alcohol while taking and for 2 days after stopping for 7 days 14 tablet 0    oxyCODONE (ROXICODONE) 5 MG immediate release tablet Take 1 tablet (5 mg total) by mouth every 6 (six) hours as needed for Pain. 5 tablet 0    prenatal vit no.124/iron/folic (PRENATAL VITAMIN ORAL) Take by mouth.       No current facility-administered medications for this visit.       Review of patient's allergies indicates:   Allergen Reactions    Boostrix tdap [diphth,pertus(acell),tetanus] Swelling and Rash    Ciprofloxacin Hives    Sulfamethoxazole-trimethoprim Hives    Doxycycline      Swelling and rash to face    Prednisone (bulk) Hives, Itching, Palpitations and Rash          OB History    Para Term  AB Living   2 2 2     2   SAB IAB Ectopic Multiple Live Births         0 2      # Outcome Date GA Lbr Atif/2nd Weight Sex Type Anes PTL Lv   2 Term 24 39w1d  3.24 kg (7 lb 2.3 oz) F Vag-Spont EPI N LATOYA   1 Term 20 39w5d  2.863 kg (6 lb 5 oz) M Vag-Spont  EPI N LATOYA          ROS   General: Denies weight gain or loss,   Systemic: Not feeling tired (fatigue).  No fever chills   Gastrointestinal: +nausea, +vomiting, +abdominal pain.  +diarrhea.  Genitourinary: No dysuria, +frequency, +urgency  Skin: No rash.    Physical Exam   Physical Exam:   Constitutional: She appears well-developed and well-nourished. She does not appear ill. No distress.               Genitourinary:    Uterus, right adnexa and left adnexa normal.      Pelvic exam was performed with patient in the lithotomy position.   The external female genitalia was normal.     Labial bartholins normal.There is no rash, tenderness or lesion on the right labia. There is no rash, tenderness or lesion on the left labia. Cervix is normal. Right adnexum displays no mass, no tenderness and no fullness. Left adnexum displays no mass, no tenderness and no fullness. There is vaginal discharge (copious amounts of thin yellow blood-tinged malodorous discharge) in the vagina. No tenderness, bleeding, rectocele, cystocele or prolapse of vaginal walls in the vagina.    No foreign body in the vagina.   Cervix exhibits no motion tenderness, no lesion, no discharge, no friability and no polyp. Normal urethral meatus.Urethra findings: no urethral mass              Neurological: She is alert.         Assessment/Plan:    Pelvic pain  -     POCT urine pregnancy  -     Urinalysis, Reflex to Urine Culture Urine, Clean Catch  -     Vaginosis Screen by DNA Probe    -upt neg  -UA sent  -Affirm sent, treated with flagyl for suspected BV based on exam and symptoms  -diflucan for antibiotic induced yeast  -suspect flu & GI distress is contributing to pelvic discomfort  -consider pelvic US if pain continues  -recommended pelvic floor PT for incontinence concerns, pt declines for now    Vaginal odor  -     Vaginosis Screen by DNA Probe  -     metroNIDAZOLE (FLAGYL) 500 MG tablet; Take 1 tablet (500 mg total) by mouth every 12 (twelve) hours.  Eat when taking, do not drink alcohol while taking and for 2 days after stopping for 7 days  Dispense: 14 tablet; Refill: 0    Acute vaginitis  -     metroNIDAZOLE (FLAGYL) 500 MG tablet; Take 1 tablet (500 mg total) by mouth every 12 (twelve) hours. Eat when taking, do not drink alcohol while taking and for 2 days after stopping for 7 days  Dispense: 14 tablet; Refill: 0    Antibiotic-induced yeast infection  -     fluconazole (DIFLUCAN) 150 MG Tab; Take 1 tablet (150 mg total) by mouth once. Take at the end of the flagyl if needed to treat/prevent a yeast infection (thick, clumpy discharge and/or itching for 1 dose  Dispense: 1 tablet; Refill: 0    Other orders  -     Urinalysis Microscopic    35 minutes of total time spent on the encounter, which includes face to face time and non-face to face time preparing to see the patient (eg, review of tests), Obtaining and/or reviewing separately obtained history, Documenting clinical information in the electronic or other health record, Independently interpreting results (not separately reported) and communicating results to the patient/family/caregiver, or Care coordination (not separately reported).

## 2025-02-12 LAB
BACTERIAL VAGINOSIS DNA: DETECTED
CANDIDA GLABRATA/KRUSEI: NOT DETECTED
CANDIDA RRNA VAG QL PROBE: NOT DETECTED
TRICHOMONAS VAGINALIS: NOT DETECTED

## 2025-02-13 RX ORDER — ONDANSETRON 4 MG/1
4 TABLET, FILM COATED ORAL EVERY 6 HOURS PRN
Qty: 30 TABLET | Refills: 0 | Status: SHIPPED | OUTPATIENT
Start: 2025-02-13

## 2025-02-20 ENCOUNTER — PROCEDURE VISIT (OUTPATIENT)
Dept: OBSTETRICS AND GYNECOLOGY | Facility: CLINIC | Age: 30
End: 2025-02-20
Payer: COMMERCIAL

## 2025-02-20 VITALS
SYSTOLIC BLOOD PRESSURE: 108 MMHG | BODY MASS INDEX: 43.4 KG/M2 | WEIGHT: 270.06 LBS | DIASTOLIC BLOOD PRESSURE: 70 MMHG | HEIGHT: 66 IN

## 2025-02-20 DIAGNOSIS — N39.0 URINARY TRACT INFECTION WITHOUT HEMATURIA, SITE UNSPECIFIED: Primary | ICD-10-CM

## 2025-02-20 DIAGNOSIS — Z30.430 ENCOUNTER FOR IUD INSERTION: ICD-10-CM

## 2025-02-20 LAB
B-HCG UR QL: NEGATIVE
CTP QC/QA: YES

## 2025-02-20 PROCEDURE — 87086 URINE CULTURE/COLONY COUNT: CPT | Performed by: OBSTETRICS & GYNECOLOGY

## 2025-02-20 NOTE — PROCEDURES
Insertion of IUD    Date/Time: 2025 10:00 AM    Performed by: Noy Robles MD  Authorized by: Noy Robles MD    Consent:     Consent given by:  Patient    Procedure risks and benefits discussed: yes      Patient questions answered: yes      Patient agrees, verbalizes understanding, and wants to proceed: yes     Device to be inserted was verified by patient: yes    Educational handouts given: yes      Instructions and paperwork completed: yes    Insertion Procedure:   1 Intra Uterine Device levonorgestreL 52 mg       Pelvic exam performed: yes      Negative GC/chlamydia test: no      Negative urine pregnancy test: yes      Negative serum pregnancy test: no      Cervix cleaned and prepped: yes      Speculum placed in vagina: yes      Tenaculum applied to cervix: yes      Uterus sounded: yes      Uterus sound depth (cm):  8    IUD inserted with no complications: yes      IUD type:  Mirena    Strings trimmed: yes    Post-procedure:     Patient tolerated procedure well: yes      Patient will follow up after next period: yes      Frankie Garcia is a 29 y.o. female  presents for IUD placement.  Patient's last menstrual period was 2025 (approximate)..  She desires Mirena.  UPT is negative.      She was counseled on the risks, benefits, indications, and alternatives to IUD use.  She understands that with insertion there is a risk of bleeding, infection, and uterine perforation.  All questions are answered.  Consents signed.  Cervical cultures were performed.    Procedure:  Time out performed.  The cervix was visualized with a speculum.  A single tooth tenaculum was placed on the anterior lip of the cervix.  The uterus sounds to 8cm using sterile technique.  A Mirena was loaded and placed high in the uterine fundus without difficulty using sterile technique.  The string was was then cut.  The tenaculum and speculum were removed.  The patient tolerated the procedure well.    Assessment:  1.   Contraceptive management/IUD insertion    Post IUD placement counseling:  Manage post IUD placement pain with NSAIDS, Tylenol or Rx per Medcard.  IUD danger signs and how to check for strings were discussed.  The IUD needs to be removed in 8 years for Mirena and 10 years for Copper IUD.    Counseling lasted approximately 15 minutes and all her questions were answered.    Follow up:  2 weeks.

## 2025-02-22 LAB
BACTERIA UR CULT: NORMAL
BACTERIA UR CULT: NORMAL

## 2025-04-29 NOTE — PATIENT INSTRUCTIONS
[facility] DELIVERY PHONE NUMBER, [phone] (OPEN 24/7, LOCATED ON 6TH FLOOR OF HOSPITAL)  [address] PHONE NUMBER, [phone] (OPEN MON-FRI, 8a-5p)      
Private car

## 2025-05-29 ENCOUNTER — OFFICE VISIT (OUTPATIENT)
Dept: OBSTETRICS AND GYNECOLOGY | Facility: CLINIC | Age: 30
End: 2025-05-29
Payer: COMMERCIAL

## 2025-05-29 VITALS
DIASTOLIC BLOOD PRESSURE: 68 MMHG | HEART RATE: 58 BPM | WEIGHT: 274.25 LBS | BODY MASS INDEX: 44.08 KG/M2 | SYSTOLIC BLOOD PRESSURE: 103 MMHG | HEIGHT: 66 IN

## 2025-05-29 DIAGNOSIS — Z97.5 BREAKTHROUGH BLEEDING ASSOCIATED WITH INTRAUTERINE DEVICE (IUD): Primary | ICD-10-CM

## 2025-05-29 DIAGNOSIS — N92.1 BREAKTHROUGH BLEEDING ASSOCIATED WITH INTRAUTERINE DEVICE (IUD): Primary | ICD-10-CM

## 2025-05-29 PROCEDURE — 1159F MED LIST DOCD IN RCRD: CPT | Mod: CPTII,S$GLB,, | Performed by: OBSTETRICS & GYNECOLOGY

## 2025-05-29 PROCEDURE — 3008F BODY MASS INDEX DOCD: CPT | Mod: CPTII,S$GLB,, | Performed by: OBSTETRICS & GYNECOLOGY

## 2025-05-29 PROCEDURE — 3078F DIAST BP <80 MM HG: CPT | Mod: CPTII,S$GLB,, | Performed by: OBSTETRICS & GYNECOLOGY

## 2025-05-29 PROCEDURE — 3074F SYST BP LT 130 MM HG: CPT | Mod: CPTII,S$GLB,, | Performed by: OBSTETRICS & GYNECOLOGY

## 2025-05-29 PROCEDURE — 99214 OFFICE O/P EST MOD 30 MIN: CPT | Mod: S$GLB,,, | Performed by: OBSTETRICS & GYNECOLOGY

## 2025-05-29 PROCEDURE — 99999 PR PBB SHADOW E&M-EST. PATIENT-LVL III: CPT | Mod: PBBFAC,,, | Performed by: OBSTETRICS & GYNECOLOGY

## 2025-05-29 RX ORDER — NORETHINDRONE ACETATE AND ETHINYL ESTRADIOL 1MG-20(21)
1 KIT ORAL DAILY
Qty: 90 TABLET | Refills: 4 | Status: SHIPPED | OUTPATIENT
Start: 2025-05-29 | End: 2026-05-29

## 2025-05-29 NOTE — PROGRESS NOTES
"History & Physical  Gynecology      SUBJECTIVE:     Chief Complaint: IUD Check       History of Present Illness:  Frankie Garcia is a 30 y.o. female  here for IUD string check.  Pt reports that she was in the bathroom the other day and noted a hair coming out of her vagina.  When she pulled the hair, felt like it was "tugging" on something.  Ended up breaking the hair but is concerned that she pulled and malpositioned her IUD. Not having any pain.  Pt has been having irregular bleeding with the IUD in place.  Bleeding 2-3 weeks out of each month.  Bleeding is light.  The first 2 weeks has light bleeding throughout the whole day.  The last week, intermittent spotting.       Review of Systems:  Review of Systems   Constitutional: Negative.  Negative for chills and fatigue.   HENT:  Negative for facial swelling.    Eyes:  Negative for visual disturbance.   Respiratory:  Negative for cough and shortness of breath.    Cardiovascular:  Negative for chest pain.   Gastrointestinal:  Negative for abdominal distention, abdominal pain, constipation, diarrhea and nausea.   Genitourinary:  Positive for menstrual problem. Negative for dysuria, genital sores, pelvic pain, vaginal bleeding, vaginal discharge and vaginal pain.   Skin:  Negative for rash.   Neurological:  Negative for dizziness, weakness, light-headedness and headaches.   Psychiatric/Behavioral:  Negative for behavioral problems. The patient is not nervous/anxious.         OBJECTIVE:   Vitals:     Vitals:    25 0945   BP: 103/68   Pulse: (!) 58   Weight: 124.4 kg (274 lb 4 oz)   Height: 5' 6" (1.676 m)        Physical Exam:  Physical Exam  Constitutional:       Appearance: She is well-developed.   HENT:      Head: Normocephalic and atraumatic.   Eyes:      General: No scleral icterus.        Right eye: No discharge.         Left eye: No discharge.      Conjunctiva/sclera: Conjunctivae normal.   Neck:      Thyroid: No thyromegaly.   Cardiovascular:      " Rate and Rhythm: Normal rate.   Pulmonary:      Effort: Pulmonary effort is normal.      Breath sounds: No stridor.   Abdominal:      General: There is no distension.      Palpations: Abdomen is soft.      Tenderness: There is no abdominal tenderness.   Genitourinary:     Labia:         Right: No rash, tenderness, lesion or injury.         Left: No rash, tenderness, lesion or injury.       Vagina: Normal. No vaginal discharge or bleeding.      Cervix: No cervical motion tenderness, discharge or friability.      Uterus: Not enlarged and not tender.       Adnexa:         Right: No mass, tenderness or fullness.          Left: No mass, tenderness or fullness.        Comments: Normal external genitalia.  Normal hair distribution.  Urethral meatus normal. No cervical lesions or masses.  IUD strings noted.  Actual IUD is not palpable.  No CMT. No vaginal bleeding noted.  No adnexal or uterine tenderness.  No palpable adnexal masses.  Musculoskeletal:         General: Normal range of motion.   Skin:     General: Skin is warm and dry.   Neurological:      Mental Status: She is alert and oriented to person, place, and time.   Psychiatric:         Behavior: Behavior normal.         Thought Content: Thought content normal.         Judgment: Judgment normal.           ASSESSMENT:       ICD-10-CM ICD-9-CM    1. Breakthrough bleeding associated with intrauterine device (IUD)  N92.1 626.6 norethindrone-ethinyl estradiol (JUNEL FE 1/20) 1 mg-20 mcg (21)/75 mg (7) per tablet    Z97.5 V45.51                  Plan:      Frankie was seen today for iud check.    Diagnoses and all orders for this visit:    Breakthrough bleeding associated with intrauterine device (IUD)  - IUD appears in correct location  - However, pt is having irregular bleeding still with IUD in place.  Has been 3 months.  Given option to give more time for her body to regulate to the IUD vs doing 1-3 months of OCPs to help with the bleeding  - Will send in Rx for the  OCPs for patient to take if needed.  Pt to reach out with any issues.   -     norethindrone-ethinyl estradiol (JUNEL FE 1/20) 1 mg-20 mcg (21)/75 mg (7) per tablet; Take 1 tablet by mouth once daily. Skip placebo pills and move to next pack        No orders of the defined types were placed in this encounter.      No follow-ups on file.      Face to Face time with patient: 20 min    25 minutes of total time spent on the encounter, which includes face to face time and non-face to face time preparing to see the patient (eg, review of tests), Obtaining and/or reviewing separately obtained history, Documenting clinical information in the electronic or other health record, Independently interpreting results (not separately reported) and communicating results to the patient/family/caregiver, or Care coordination (not separately reported).      Noy Robles MD  Obstetrics & Gynecology

## 2025-06-03 ENCOUNTER — PATIENT MESSAGE (OUTPATIENT)
Dept: OBSTETRICS AND GYNECOLOGY | Facility: CLINIC | Age: 30
End: 2025-06-03
Payer: COMMERCIAL

## 2025-06-03 RX ORDER — VALACYCLOVIR HYDROCHLORIDE 500 MG/1
500 TABLET, FILM COATED ORAL 2 TIMES DAILY
Qty: 60 TABLET | Refills: 11 | Status: SHIPPED | OUTPATIENT
Start: 2025-06-03 | End: 2026-06-03